# Patient Record
Sex: FEMALE | Race: WHITE | NOT HISPANIC OR LATINO | Employment: OTHER | RURAL
[De-identification: names, ages, dates, MRNs, and addresses within clinical notes are randomized per-mention and may not be internally consistent; named-entity substitution may affect disease eponyms.]

---

## 2023-03-31 ENCOUNTER — HOSPITAL ENCOUNTER (OUTPATIENT)
Dept: CARDIOLOGY | Facility: HOSPITAL | Age: 78
Discharge: HOME | End: 2023-03-31
Attending: STUDENT IN AN ORGANIZED HEALTH CARE EDUCATION/TRAINING PROGRAM
Payer: MEDICARE

## 2023-03-31 DIAGNOSIS — R42 DIZZINESS AND GIDDINESS: ICD-10-CM

## 2023-03-31 DIAGNOSIS — R42 DIZZINESS AND GIDDINESS: Primary | ICD-10-CM

## 2023-03-31 LAB
EJECTION FRACTION: 60 %
RA PRESSURE: 3 MMHG

## 2023-03-31 PROCEDURE — 93306 CV EXTERNAL ECHO (CUPID ONLY): ICD-10-PCS | Mod: 26,,, | Performed by: STUDENT IN AN ORGANIZED HEALTH CARE EDUCATION/TRAINING PROGRAM

## 2023-03-31 PROCEDURE — 93306 TTE W/DOPPLER COMPLETE: CPT | Mod: 26,,, | Performed by: STUDENT IN AN ORGANIZED HEALTH CARE EDUCATION/TRAINING PROGRAM

## 2023-04-06 ENCOUNTER — OFFICE VISIT (OUTPATIENT)
Dept: CARDIOLOGY | Facility: CLINIC | Age: 78
End: 2023-04-06
Payer: MEDICARE

## 2023-04-06 VITALS
BODY MASS INDEX: 29.86 KG/M2 | WEIGHT: 158.19 LBS | HEIGHT: 61 IN | OXYGEN SATURATION: 95 % | HEART RATE: 59 BPM | SYSTOLIC BLOOD PRESSURE: 188 MMHG | DIASTOLIC BLOOD PRESSURE: 92 MMHG

## 2023-04-06 DIAGNOSIS — I10 ESSENTIAL HYPERTENSION: ICD-10-CM

## 2023-04-06 DIAGNOSIS — R55 SYNCOPE AND COLLAPSE: Primary | ICD-10-CM

## 2023-04-06 DIAGNOSIS — E78.5 HYPERLIPIDEMIA, UNSPECIFIED HYPERLIPIDEMIA TYPE: ICD-10-CM

## 2023-04-06 DIAGNOSIS — R42 DIZZINESS AND GIDDINESS: Primary | ICD-10-CM

## 2023-04-06 DIAGNOSIS — R00.1 BRADYCARDIA: ICD-10-CM

## 2023-04-06 DIAGNOSIS — R94.31 NONSPECIFIC ABNORMAL ELECTROCARDIOGRAM (ECG) (EKG): ICD-10-CM

## 2023-04-06 PROCEDURE — 99205 PR OFFICE/OUTPT VISIT, NEW, LEVL V, 60-74 MIN: ICD-10-PCS | Mod: ,,, | Performed by: INTERNAL MEDICINE

## 2023-04-06 PROCEDURE — 99205 OFFICE O/P NEW HI 60 MIN: CPT | Mod: ,,, | Performed by: INTERNAL MEDICINE

## 2023-04-06 RX ORDER — SERTRALINE HYDROCHLORIDE 50 MG/1
TABLET, FILM COATED ORAL
COMMUNITY
Start: 2022-11-12

## 2023-04-06 RX ORDER — IRBESARTAN AND HYDROCHLOROTHIAZIDE 300; 12.5 MG/1; MG/1
TABLET, FILM COATED ORAL
COMMUNITY
Start: 2022-09-21

## 2023-04-06 RX ORDER — CLOPIDOGREL BISULFATE 75 MG/1
TABLET ORAL
COMMUNITY
Start: 2022-09-21

## 2023-04-06 RX ORDER — METFORMIN HYDROCHLORIDE 1000 MG/1
1000 TABLET ORAL 2 TIMES DAILY
COMMUNITY
Start: 2023-04-01

## 2023-04-06 RX ORDER — CLONIDINE HYDROCHLORIDE 0.1 MG/1
TABLET ORAL
COMMUNITY
Start: 2023-01-10

## 2023-04-06 RX ORDER — LEVOTHYROXINE SODIUM 100 UG/1
TABLET ORAL
COMMUNITY
Start: 2023-01-09

## 2023-04-06 NOTE — PROGRESS NOTES
Cardiology Clinic Note:    PCP: Jennifer Zurita MD    REFERRING PHYSICIAN:     CHIEF COMPLAINT: Syncope    HISTORY OF PRESENT ILLNESS:  Дмитрий Collazo is a 77 y.o. female who presents for evaluation of syncope.  Pt reports one severe episodes of dizziness, most severe episode started with tingling all over, got up to get her phone, walking in the hallway, felt the room start to spin, hung onto half wall, thinks went out with everything going black for several seconds, passed quickly, was able to stand and walk after ten to fifteen seconds.  Felt washed out the rest of the day.  She notes frequent symptoms of orthostatic symptoms if gets up too quickly.  Previous cardiac evaluation approximately ten years ago, had echo and stress for similar symptoms, were reportedly normal.      Review of Systems:  Review of Systems   Constitutional: Negative for diaphoresis, malaise/fatigue, night sweats and weight gain.   HENT:  Negative for congestion, ear pain, hearing loss, nosebleeds and sore throat.    Eyes:  Positive for vision loss in right eye. Negative for blurred vision, double vision, pain, photophobia and visual disturbance.        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 Negative for chest pain, claudication, irregular heartbeat, near-syncope, orthopnea, palpitations and syncope.   Respiratory:  Positive for shortness of breath and wheezing. Negative for cough, sleep disturbances due to breathing and snoring.    Endocrine: Negative for cold intolerance, heat intolerance, polydipsia, polyphagia and polyuria.        DM x 10 plus years, monitors bs, are controlled.    Hematologic/Lymphatic: Negative for bleeding problem. Does not bruise/bleed easily.   Skin:  Negative for dry skin, flushing, itching, rash and skin cancer.   Musculoskeletal:  Positive for arthritis and back pain. Negative for falls, joint pain, muscle cramps, muscle weakness and myalgias.        OA  hands, feet low back   Gastrointestinal:  Negative for abdominal pain, change in bowel habit, constipation, diarrhea, dysphagia, heartburn, nausea and vomiting.   Genitourinary:  Negative for bladder incontinence, dysuria, flank pain, frequency and nocturia.   Neurological:  Positive for focal weakness. Negative for dizziness, headaches, light-headedness, loss of balance, numbness, paresthesias and seizures.        Tingling of hands at night.    Psychiatric/Behavioral:  Negative for depression, memory loss and substance abuse. The patient is not nervous/anxious.    Allergic/Immunologic: Negative for environmental allergies.        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 macular edema        PAST SURGICAL HISTORY:  Past Surgical History:   Procedure Laterality Date    KNEE SURGERY      TUBAL LIGATION         SOCIAL HISTORY:  Social History     Socioeconomic History    Marital status: Unknown   Tobacco Use    Smoking status: Never    Smokeless tobacco: Never   Substance and Sexual Activity    Alcohol use: Never    Drug use: Never    Sexual activity: Not Currently       FAMILY HISTORY:  Family History   Problem Relation Age of Onset    Hypertension Mother     Stroke Mother     Hypertension Father     Heart disease Sister     Diabetes Mellitus Sister     Diabetes Mellitus Sister        ALLERGIES:  Allergies as of 04/06/2023 - Reviewed 04/06/2023   Allergen Reaction Noted    Celecoxib  04/06/2023    Codeine  04/06/2023    Penicillins  04/06/2023         MEDICATIONS:  Current Outpatient Medications on File Prior to Visit   Medication Sig Dispense Refill    cloNIDine (CATAPRES) 0.1 MG tablet       clopidogreL (PLAVIX) 75 mg tablet Take by mouth.      irbesartan-hydrochlorothiazide (AVALIDE)  "300-12.5 mg per tablet Take by mouth.      levothyroxine (SYNTHROID) 100 MCG tablet Take by mouth.      metFORMIN (GLUCOPHAGE) 1000 MG tablet Take 1,000 mg by mouth 2 (two) times daily.      sertraline (ZOLOFT) 50 MG tablet        No current facility-administered medications on file prior to visit.          PHYSICAL EXAM:  Blood pressure (!) 188/92, pulse (!) 59, height 5' 1" (1.549 m), weight 71.8 kg (158 lb 3.2 oz), SpO2 95 %.  Wt Readings from Last 3 Encounters:   04/06/23 71.8 kg (158 lb 3.2 oz)      Body mass index is 29.89 kg/m².    Physical Exam  Vitals and nursing note reviewed.   Constitutional:       Appearance: Normal appearance. She is normal weight.   HENT:      Head: Normocephalic and atraumatic.      Right Ear: External ear normal.      Left Ear: External ear normal.   Eyes:      General: No scleral icterus.        Right eye: No discharge.         Left eye: No discharge.      Extraocular Movements: Extraocular movements intact.      Conjunctiva/sclera: Conjunctivae normal.      Pupils: Pupils are equal, round, and reactive to light.      Comments: Blind in right eye   Cardiovascular:      Rate and Rhythm: Normal rate and regular rhythm.      Pulses: Normal pulses.      Heart sounds: Normal heart sounds. No murmur heard.    No friction rub. No gallop.   Pulmonary:      Effort: Pulmonary effort is normal.      Breath sounds: Normal breath sounds. No wheezing, rhonchi or rales.   Chest:      Chest wall: No tenderness.   Abdominal:      General: Abdomen is flat. Bowel sounds are normal. There is no distension.      Palpations: Abdomen is soft.      Tenderness: There is no abdominal tenderness. There is no guarding or rebound.   Musculoskeletal:         General: No swelling or tenderness. Normal range of motion.      Cervical back: Normal range of motion and neck supple.   Skin:     General: Skin is warm and dry.      Findings: No erythema or rash.   Neurological:      General: No focal deficit present. "      Mental Status: She is alert and oriented to person, place, and time.      Cranial Nerves: No cranial nerve deficit.      Motor: No weakness.      Gait: Gait normal.   Psychiatric:         Mood and Affect: Mood normal.         Behavior: Behavior normal.         Thought Content: Thought content normal.         Judgment: Judgment normal.        LABS REVIEWED:  No results found for: WBC, RBC, HGB, HCT, MCV, MCH, MCHC, RDW, PLT, MPV, NRBC, INR  No results found for: NA, K, CL, CO2, BUN, MG, PHOS  No results found for: CPK, AST, ALT  No results found for: GLU, HGBA1C  No results found for: CHOL, HDL, LDL, TRIG, CHOLHDL    CARDIAC STUDIES REVIEWED:    OTHER IMAGING STUDIES REVIEWED:        ASSESSMENT: PLAN:  1. Syncope: Zio, monitor for arrhythmia, echo to eval for structural heart   2. Sinus bradycardia, order lexiscan cardiolyte to evaluate for ischemic substrate  3. Hypertension: may be too well controlled at home, monitor bp AM and PM and record for review at next office visit  4. DM: controlled on current meds  5. Hypothyroid: on replacement  6. Hyperliplidemia: order fasting lipid profile.   7. Stroke: on Plavix for primary prevention.

## 2023-04-13 ENCOUNTER — OUTSIDE PLACE OF SERVICE (OUTPATIENT)
Dept: CARDIOLOGY | Facility: HOSPITAL | Age: 78
End: 2023-04-13
Payer: MEDICARE

## 2023-04-13 PROCEDURE — 78452 HT MUSCLE IMAGE SPECT MULT: CPT | Mod: 26,,, | Performed by: STUDENT IN AN ORGANIZED HEALTH CARE EDUCATION/TRAINING PROGRAM

## 2023-04-13 PROCEDURE — 93018 PR CARDIAC STRESS TST,INTERP/REPT ONLY: ICD-10-PCS | Mod: ,,, | Performed by: STUDENT IN AN ORGANIZED HEALTH CARE EDUCATION/TRAINING PROGRAM

## 2023-04-13 PROCEDURE — 93016 PR CARDIAC STRESS TST,DR SUPERV ONLY: ICD-10-PCS | Mod: ,,, | Performed by: STUDENT IN AN ORGANIZED HEALTH CARE EDUCATION/TRAINING PROGRAM

## 2023-04-13 PROCEDURE — 93016 CV STRESS TEST SUPVJ ONLY: CPT | Mod: ,,, | Performed by: STUDENT IN AN ORGANIZED HEALTH CARE EDUCATION/TRAINING PROGRAM

## 2023-04-13 PROCEDURE — 78452 CHG MYOCARDIAL SPECT MULTIPLE STUDIES: ICD-10-PCS | Mod: 26,,, | Performed by: STUDENT IN AN ORGANIZED HEALTH CARE EDUCATION/TRAINING PROGRAM

## 2023-04-13 PROCEDURE — 93018 CV STRESS TEST I&R ONLY: CPT | Mod: ,,, | Performed by: STUDENT IN AN ORGANIZED HEALTH CARE EDUCATION/TRAINING PROGRAM

## 2023-04-29 PROBLEM — R94.31 NONSPECIFIC ABNORMAL ELECTROCARDIOGRAM (ECG) (EKG): Status: ACTIVE | Noted: 2023-04-29

## 2023-04-29 PROBLEM — I10 ESSENTIAL HYPERTENSION: Status: ACTIVE | Noted: 2023-04-29

## 2023-04-29 PROBLEM — R55 SYNCOPE AND COLLAPSE: Status: ACTIVE | Noted: 2023-04-29

## 2023-04-29 PROBLEM — E78.5 HYPERLIPIDEMIA: Status: ACTIVE | Noted: 2023-04-29

## 2023-04-29 PROBLEM — R00.1 BRADYCARDIA: Status: ACTIVE | Noted: 2023-04-29

## 2023-05-04 ENCOUNTER — OFFICE VISIT (OUTPATIENT)
Dept: CARDIOLOGY | Facility: CLINIC | Age: 78
End: 2023-05-04
Payer: MEDICARE

## 2023-05-04 VITALS
OXYGEN SATURATION: 97 % | BODY MASS INDEX: 30.02 KG/M2 | DIASTOLIC BLOOD PRESSURE: 66 MMHG | HEIGHT: 61 IN | SYSTOLIC BLOOD PRESSURE: 150 MMHG | WEIGHT: 159 LBS | HEART RATE: 62 BPM

## 2023-05-04 DIAGNOSIS — R55 SYNCOPE AND COLLAPSE: ICD-10-CM

## 2023-05-04 DIAGNOSIS — R94.31 NONSPECIFIC ABNORMAL ELECTROCARDIOGRAM (ECG) (EKG): Primary | ICD-10-CM

## 2023-05-04 DIAGNOSIS — R00.1 BRADYCARDIA: ICD-10-CM

## 2023-05-04 DIAGNOSIS — I10 ESSENTIAL HYPERTENSION: ICD-10-CM

## 2023-05-04 PROCEDURE — 99214 OFFICE O/P EST MOD 30 MIN: CPT | Mod: ,,, | Performed by: INTERNAL MEDICINE

## 2023-05-04 PROCEDURE — 99214 PR OFFICE/OUTPT VISIT, EST, LEVL IV, 30-39 MIN: ICD-10-PCS | Mod: ,,, | Performed by: INTERNAL MEDICINE

## 2023-05-04 NOTE — PROGRESS NOTES
Cardiology Clinic Note:    PCP: Jennifer Zurita MD    REFERRING PHYSICIAN:     CHIEF COMPLAINT: Syncope    HISTORY OF PRESENT ILLNESS:  Дмитрий Collazo is a 78 y.o. female who presents for evaluation of syncope, notes symptoms have resolved with much more careful positional changes, counting to ten before she tries to ambulate. She reports is able to perform normal activities of normal living without provocation of chest pain, pressure or shortness of breath.   She had one dizziness episode after bending over to take clothes out of dryer, held on until symptoms passes.       Review of Systems:  Review of Systems   Constitutional: Negative for diaphoresis, malaise/fatigue, night sweats and weight gain.   HENT:  Negative for congestion, ear pain, hearing loss, nosebleeds and sore throat.    Eyes:  Positive for vision loss in right eye. Negative for blurred vision, double vision, pain, photophobia and visual disturbance.        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 Negative for chest pain, claudication, irregular heartbeat, near-syncope, orthopnea, palpitations and syncope.   Respiratory:  Positive for shortness of breath and wheezing. Negative for cough, sleep disturbances due to breathing and snoring.    Endocrine: Negative for cold intolerance, heat intolerance, polydipsia, polyphagia and polyuria.        DM x 10 plus years, monitors bs, are controlled.    Hematologic/Lymphatic: Negative for bleeding problem. Does not bruise/bleed easily.   Skin:  Negative for dry skin, flushing, itching, rash and skin cancer.   Musculoskeletal:  Positive for arthritis and back pain. Negative for falls, joint pain, muscle cramps, muscle weakness and myalgias.        OA hands, feet low back   Gastrointestinal:  Negative for abdominal pain, change in bowel habit, constipation, diarrhea, dysphagia, heartburn, nausea and vomiting.   Genitourinary:  Negative for bladder  incontinence, dysuria, flank pain, frequency and nocturia.   Neurological:  Positive for focal weakness. Negative for dizziness, headaches, light-headedness, loss of balance, numbness, paresthesias and seizures.        Tingling of hands at night.    Psychiatric/Behavioral:  Negative for depression, memory loss and substance abuse. The patient is not nervous/anxious.    Allergic/Immunologic: Negative for environmental allergies.        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        PAST SURGICAL HISTORY:  Past Surgical History:   Procedure Laterality Date    KNEE SURGERY      TUBAL LIGATION         SOCIAL HISTORY:  Social History     Socioeconomic History    Marital status: Unknown   Tobacco Use    Smoking status: Never    Smokeless tobacco: Never   Substance and Sexual Activity    Alcohol use: Never    Drug use: Never    Sexual activity: Not Currently       FAMILY HISTORY:  Family History   Problem Relation Age of Onset    Hypertension Mother     Stroke Mother     Hypertension Father     Heart disease Sister     Diabetes Mellitus Sister     Diabetes Mellitus Sister        ALLERGIES:  Allergies as of 05/04/2023 - Reviewed 05/04/2023   Allergen Reaction Noted    Celecoxib  04/06/2023    Codeine  04/06/2023    Penicillins  04/06/2023         MEDICATIONS:  Current Outpatient Medications on File Prior to Visit   Medication Sig Dispense Refill    cloNIDine (CATAPRES) 0.1 MG tablet       clopidogreL (PLAVIX) 75 mg tablet Take by mouth.      irbesartan-hydrochlorothiazide (AVALIDE) 300-12.5 mg per tablet Take by mouth.      levothyroxine (SYNTHROID) 100 MCG tablet Take by mouth.      metFORMIN (GLUCOPHAGE) 1000 MG tablet Take 1,000 mg by mouth 2 (two) times daily.      sertraline (ZOLOFT) 50 MG tablet     "    No current facility-administered medications on file prior to visit.          PHYSICAL EXAM:  Blood pressure (!) 150/66, pulse 62, height 5' 1" (1.549 m), weight 72.1 kg (159 lb), SpO2 97 %.  Wt Readings from Last 3 Encounters:   05/04/23 72.1 kg (159 lb)   04/06/23 71.8 kg (158 lb 3.2 oz)      Body mass index is 30.04 kg/m².    Physical Exam  Vitals and nursing note reviewed.   Constitutional:       Appearance: Normal appearance. She is normal weight.   HENT:      Head: Normocephalic and atraumatic.      Right Ear: External ear normal.      Left Ear: External ear normal.   Eyes:      General: No scleral icterus.        Right eye: No discharge.         Left eye: No discharge.      Extraocular Movements: Extraocular movements intact.      Conjunctiva/sclera: Conjunctivae normal.      Pupils: Pupils are equal, round, and reactive to light.      Comments: Blind in right eye   Cardiovascular:      Rate and Rhythm: Normal rate and regular rhythm.      Pulses: Normal pulses.      Heart sounds: Normal heart sounds. No murmur heard.    No friction rub. No gallop.   Pulmonary:      Effort: Pulmonary effort is normal.      Breath sounds: Normal breath sounds. No wheezing, rhonchi or rales.   Chest:      Chest wall: No tenderness.   Abdominal:      General: Abdomen is flat. Bowel sounds are normal. There is no distension.      Palpations: Abdomen is soft.      Tenderness: There is no abdominal tenderness. There is no guarding or rebound.   Musculoskeletal:         General: No swelling or tenderness. Normal range of motion.      Cervical back: Normal range of motion and neck supple.   Skin:     General: Skin is warm and dry.      Findings: No erythema or rash.   Neurological:      General: No focal deficit present.      Mental Status: She is alert and oriented to person, place, and time.      Cranial Nerves: No cranial nerve deficit.      Motor: No weakness.      Gait: Gait normal.   Psychiatric:         Mood and Affect: " Mood normal.         Behavior: Behavior normal.         Thought Content: Thought content normal.         Judgment: Judgment normal.        LABS REVIEWED:  No results found for: WBC, RBC, HGB, HCT, MCV, MCH, MCHC, RDW, PLT, MPV, NRBC, INR  No results found for: NA, K, CL, CO2, BUN, MG, PHOS  No results found for: CPK, AST, ALT  No results found for: GLU, HGBA1C  No results found for: CHOL, HDL, LDL, TRIG, CHOLHDL    CARDIAC STUDIES REVIEWED:    OTHER IMAGING STUDIES REVIEWED:        ASSESSMENT: PLAN:  1. Syncope: appears, mostly due to orthostatic hypotension, will begin compression stockings, underwent Zio, monitor for arrhythmia, awaiting results. echo shows moderate MR, otherwise no structural heart disease.  2. Sinus bradycardia, lexiscan cardiolyte negative for ischemic substrate  3. Hypertension: not fully controlled at home, monitored bp AM and PM, has been been high if she has a headache, will monitor with compression stockings, if she is not having syncopal episodes, may be able to increase bp meds to avoid headaches. .  4. DM: controlled on current meds  5. Hypothyroid: on replacement  6. Hyperliplidemia: order fasting lipid profile.   7. Stroke: on Plavix for primary prevention.     Follow up in three months.

## 2023-06-08 ENCOUNTER — OFFICE VISIT (OUTPATIENT)
Dept: CARDIOLOGY | Facility: CLINIC | Age: 78
End: 2023-06-08
Payer: MEDICARE

## 2023-06-08 VITALS
DIASTOLIC BLOOD PRESSURE: 84 MMHG | OXYGEN SATURATION: 99 % | HEART RATE: 51 BPM | HEIGHT: 61 IN | WEIGHT: 155 LBS | BODY MASS INDEX: 29.27 KG/M2 | SYSTOLIC BLOOD PRESSURE: 142 MMHG

## 2023-06-08 DIAGNOSIS — E78.5 HYPERLIPIDEMIA, UNSPECIFIED HYPERLIPIDEMIA TYPE: ICD-10-CM

## 2023-06-08 DIAGNOSIS — I10 ESSENTIAL HYPERTENSION: ICD-10-CM

## 2023-06-08 DIAGNOSIS — R00.1 BRADYCARDIA: Primary | ICD-10-CM

## 2023-06-08 DIAGNOSIS — R55 SYNCOPE AND COLLAPSE: ICD-10-CM

## 2023-06-08 PROCEDURE — 99214 OFFICE O/P EST MOD 30 MIN: CPT | Mod: ,,, | Performed by: INTERNAL MEDICINE

## 2023-06-08 PROCEDURE — 99214 PR OFFICE/OUTPT VISIT, EST, LEVL IV, 30-39 MIN: ICD-10-PCS | Mod: ,,, | Performed by: INTERNAL MEDICINE

## 2023-06-08 RX ORDER — ASPIRIN 81 MG/1
81 TABLET ORAL DAILY
COMMUNITY
End: 2023-06-11 | Stop reason: ALTCHOICE

## 2023-06-08 RX ORDER — GABAPENTIN 300 MG/1
300 CAPSULE ORAL 3 TIMES DAILY
Status: ON HOLD | COMMUNITY
End: 2024-03-05

## 2023-06-11 RX ORDER — METOPROLOL SUCCINATE 25 MG/1
25 TABLET, EXTENDED RELEASE ORAL DAILY
Qty: 30 TABLET | Refills: 5 | Status: SHIPPED | OUTPATIENT
Start: 2023-06-11

## 2023-06-11 NOTE — PROGRESS NOTES
Cardiology Clinic Note:    PCP: Jennifer Zurita MD    REFERRING PHYSICIAN:     CHIEF COMPLAINT: Syncope    HISTORY OF PRESENT ILLNESS:  Дмитрий Collazo is a 78 y.o. female who presents for evaluation of syncope, notes symptoms have resolved with much more careful positional changes, counting to ten before she tries to ambulate and wearing compression stocking. She reports is able to perform normal activities of normal living without provocation of chest pain, pressure or shortness of breath.   She notes episodes of dizziness has resolved.                                Review of Systems:  Review of Systems   Constitutional: Negative for diaphoresis, malaise/fatigue, night sweats and weight gain.   HENT:  Negative for congestion, ear pain, hearing loss, nosebleeds and sore throat.    Eyes:  Positive for vision loss in right eye. Negative for blurred vision, double vision, pain, photophobia and visual disturbance.        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 Negative for chest pain, claudication, irregular heartbeat, near-syncope, orthopnea, palpitations and syncope.   Respiratory:  Positive for shortness of breath and wheezing. Negative for cough, sleep disturbances due to breathing and snoring.    Endocrine: Negative for cold intolerance, heat intolerance, polydipsia, polyphagia and polyuria.        DM x 10 plus years, monitors bs, are controlled.    Hematologic/Lymphatic: Negative for bleeding problem. Does not bruise/bleed easily.   Skin:  Negative for dry skin, flushing, itching, rash and skin cancer.   Musculoskeletal:  Positive for arthritis and back pain. Negative for falls, joint pain, muscle cramps, muscle weakness and myalgias.        OA hands, feet low back   Gastrointestinal:  Negative for abdominal pain, change in bowel habit, constipation, diarrhea, dysphagia, heartburn, nausea and vomiting.   Genitourinary:  Negative for bladder incontinence,  dysuria, flank pain, frequency and nocturia.   Neurological:  Positive for focal weakness. Negative for dizziness, headaches, light-headedness, loss of balance, numbness, paresthesias and seizures.        Tingling of hands at night.    Psychiatric/Behavioral:  Negative for depression, memory loss and substance abuse. The patient is not nervous/anxious.    Allergic/Immunologic: Negative for environmental allergies.        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        PAST SURGICAL HISTORY:  Past Surgical History:   Procedure Laterality Date    KNEE SURGERY      TUBAL LIGATION         SOCIAL HISTORY:  Social History     Socioeconomic History    Marital status: Unknown   Tobacco Use    Smoking status: Never    Smokeless tobacco: Never   Substance and Sexual Activity    Alcohol use: Never    Drug use: Never    Sexual activity: Not Currently       FAMILY HISTORY:  Family History   Problem Relation Age of Onset    Hypertension Mother     Stroke Mother     Hypertension Father     Heart disease Sister     Diabetes Mellitus Sister     Diabetes Mellitus Sister        ALLERGIES:  Allergies as of 06/08/2023 - Reviewed 06/08/2023   Allergen Reaction Noted    Celecoxib  04/06/2023    Codeine  04/06/2023    Penicillins  04/06/2023         MEDICATIONS:  Current Outpatient Medications on File Prior to Visit   Medication Sig Dispense Refill    aspirin (ECOTRIN) 81 MG EC tablet Take 81 mg by mouth once daily.      cloNIDine (CATAPRES) 0.1 MG tablet       clopidogreL (PLAVIX) 75 mg tablet Take by mouth.      gabapentin (NEURONTIN) 300 MG capsule Take 300 mg by mouth 3 (three) times daily.      irbesartan-hydrochlorothiazide (AVALIDE) 300-12.5 mg per tablet Take by mouth.      levothyroxine (SYNTHROID) 100 MCG tablet  "Take by mouth.      metFORMIN (GLUCOPHAGE) 1000 MG tablet Take 1,000 mg by mouth 2 (two) times daily.      sertraline (ZOLOFT) 50 MG tablet        No current facility-administered medications on file prior to visit.          PHYSICAL EXAM:  Blood pressure (!) 142/84, pulse (!) 51, height 5' 1" (1.549 m), weight 70.3 kg (155 lb), SpO2 99 %.  Wt Readings from Last 3 Encounters:   06/08/23 70.3 kg (155 lb)   05/04/23 72.1 kg (159 lb)   04/06/23 71.8 kg (158 lb 3.2 oz)      Body mass index is 29.29 kg/m².    Physical Exam  Vitals and nursing note reviewed.   Constitutional:       Appearance: Normal appearance. She is normal weight.   HENT:      Head: Normocephalic and atraumatic.      Right Ear: External ear normal.      Left Ear: External ear normal.   Eyes:      General: No scleral icterus.        Right eye: No discharge.         Left eye: No discharge.      Extraocular Movements: Extraocular movements intact.      Conjunctiva/sclera: Conjunctivae normal.      Pupils: Pupils are equal, round, and reactive to light.      Comments: Blind in right eye   Cardiovascular:      Rate and Rhythm: Normal rate and regular rhythm.      Pulses: Normal pulses.      Heart sounds: Normal heart sounds. No murmur heard.    No friction rub. No gallop.   Pulmonary:      Effort: Pulmonary effort is normal.      Breath sounds: Normal breath sounds. No wheezing, rhonchi or rales.   Chest:      Chest wall: No tenderness.   Abdominal:      General: Abdomen is flat. Bowel sounds are normal. There is no distension.      Palpations: Abdomen is soft.      Tenderness: There is no abdominal tenderness. There is no guarding or rebound.   Musculoskeletal:         General: No swelling or tenderness. Normal range of motion.      Cervical back: Normal range of motion and neck supple.   Skin:     General: Skin is warm and dry.      Findings: No erythema or rash.   Neurological:      General: No focal deficit present.      Mental Status: She is alert and " oriented to person, place, and time.      Cranial Nerves: No cranial nerve deficit.      Motor: No weakness.      Gait: Gait normal.   Psychiatric:         Mood and Affect: Mood normal.         Behavior: Behavior normal.         Thought Content: Thought content normal.         Judgment: Judgment normal.        LABS REVIEWED:  No results found for: WBC, RBC, HGB, HCT, MCV, MCH, MCHC, RDW, PLT, MPV, NRBC, INR  No results found for: NA, K, CL, CO2, BUN, MG, PHOS  No results found for: CPK, AST, ALT  No results found for: GLU, HGBA1C  No results found for: CHOL, HDL, LDL, TRIG, CHOLHDL    CARDIAC STUDIES REVIEWED:    OTHER IMAGING STUDIES REVIEWED:        ASSESSMENT: PLAN:  1. Syncope: appears, mostly due to orthostatic hypotension, has improved, with compression stockings, u   2. Sinus bradycardia, lexiscan cardiolyte negative for ischemic substrate  3. Hypertension: not fully controlled at home, monitored bp AM and PM, has been been high if she has a headache, gets headache when blood pressure is elevated.             4. DM: controlled on current meds  5. Hypothyroid: on replacement  6 . Stroke: on Plavix for primary prevention.     Follow up in 4 to 6 wks

## 2023-08-10 ENCOUNTER — OFFICE VISIT (OUTPATIENT)
Dept: CARDIOLOGY | Facility: CLINIC | Age: 78
End: 2023-08-10
Payer: MEDICARE

## 2023-08-10 VITALS
SYSTOLIC BLOOD PRESSURE: 140 MMHG | HEIGHT: 61 IN | OXYGEN SATURATION: 96 % | HEART RATE: 57 BPM | WEIGHT: 154 LBS | DIASTOLIC BLOOD PRESSURE: 90 MMHG | BODY MASS INDEX: 29.07 KG/M2

## 2023-08-10 DIAGNOSIS — R55 SYNCOPE AND COLLAPSE: ICD-10-CM

## 2023-08-10 DIAGNOSIS — I10 ESSENTIAL HYPERTENSION: Primary | ICD-10-CM

## 2023-08-10 DIAGNOSIS — E78.2 MIXED HYPERLIPIDEMIA: ICD-10-CM

## 2023-08-10 DIAGNOSIS — E11.9 DIABETES MELLITUS WITHOUT COMPLICATION: ICD-10-CM

## 2023-08-10 DIAGNOSIS — R00.1 BRADYCARDIA: ICD-10-CM

## 2023-08-10 PROCEDURE — 99214 OFFICE O/P EST MOD 30 MIN: CPT | Mod: ,,, | Performed by: INTERNAL MEDICINE

## 2023-08-10 PROCEDURE — 99214 PR OFFICE/OUTPT VISIT, EST, LEVL IV, 30-39 MIN: ICD-10-PCS | Mod: ,,, | Performed by: INTERNAL MEDICINE

## 2023-08-28 PROBLEM — E11.9 DIABETES MELLITUS WITHOUT COMPLICATION: Status: ACTIVE | Noted: 2023-08-28

## 2023-08-28 NOTE — PROGRESS NOTES
Cardiology Clinic Note:    PCP: Jennifer Zurita MD    REFERRING PHYSICIAN:     CHIEF COMPLAINT: Syncope, hypertension    HISTORY OF PRESENT ILLNESS:  Дмитрий Collazo is a 78 y.o. female who presents for evaluation of orthostatic syncope, hypertension.  She notes blood pressure was not well controlled, presented to the ER with headache and elevated blood pressure, was started on Toprol XL 25 mg q d,   She is monitoring her blood pressure at home, notes is much better controlled, headaches have resolved. She also notes symptoms have resolved with much more careful positional changes, counting to ten before she tries to ambulate and wearing compression stocking. She reports is able to perform normal activities of normal living without provocation of chest pain, pressure or shortness of breath.   She notes episodes of dizziness has resolved.                                Review of Systems:  Review of Systems   Constitutional: Negative for diaphoresis, malaise/fatigue, night sweats and weight gain.   HENT:  Negative for congestion, ear pain, hearing loss, nosebleeds and sore throat.    Eyes:  Positive for vision loss in right eye. Negative for blurred vision, double vision, pain, photophobia and visual disturbance.        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 Negative for chest pain, claudication, irregular heartbeat, near-syncope, orthopnea, palpitations and syncope.   Respiratory:  Positive for shortness of breath and wheezing. Negative for cough, sleep disturbances due to breathing and snoring.    Endocrine: Negative for cold intolerance, heat intolerance, polydipsia, polyphagia and polyuria.        DM x 10 plus years, monitors bs, are controlled.    Hematologic/Lymphatic: Negative for bleeding problem. Does not bruise/bleed easily.   Skin:  Negative for dry skin, flushing, itching, rash and skin cancer.   Musculoskeletal:  Positive for arthritis and back  pain. Negative for falls, joint pain, muscle cramps, muscle weakness and myalgias.        OA hands, feet low back   Gastrointestinal:  Negative for abdominal pain, change in bowel habit, constipation, diarrhea, dysphagia, heartburn, nausea and vomiting.   Genitourinary:  Negative for bladder incontinence, dysuria, flank pain, frequency and nocturia.   Neurological:  Positive for focal weakness. Negative for dizziness, headaches, light-headedness, loss of balance, numbness, paresthesias and seizures.        Tingling of hands at night.    Psychiatric/Behavioral:  Negative for depression, memory loss and substance abuse. The patient is not nervous/anxious.    Allergic/Immunologic: Negative for environmental allergies.          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        PAST SURGICAL HISTORY:  Past Surgical History:   Procedure Laterality Date    KNEE SURGERY      TUBAL LIGATION         SOCIAL HISTORY:  Social History     Socioeconomic History    Marital status: Unknown   Tobacco Use    Smoking status: Never    Smokeless tobacco: Never   Substance and Sexual Activity    Alcohol use: Never    Drug use: Never    Sexual activity: Not Currently       FAMILY HISTORY:  Family History   Problem Relation Age of Onset    Hypertension Mother     Stroke Mother     Hypertension Father     Heart disease Sister     Diabetes Mellitus Sister     Diabetes Mellitus Sister        ALLERGIES:  Allergies as of 08/10/2023 - Reviewed 08/10/2023   Allergen Reaction Noted    Celecoxib  04/06/2023    Codeine  04/06/2023    Penicillins  04/06/2023         MEDICATIONS:  Current Outpatient Medications on File Prior to Visit   Medication Sig Dispense Refill    cloNIDine (CATAPRES) 0.1 MG tablet       clopidogreL (PLAVIX) 75 mg  "tablet Take by mouth.      gabapentin (NEURONTIN) 300 MG capsule Take 300 mg by mouth 3 (three) times daily.      irbesartan-hydrochlorothiazide (AVALIDE) 300-12.5 mg per tablet Take by mouth.      levothyroxine (SYNTHROID) 100 MCG tablet Take by mouth.      metFORMIN (GLUCOPHAGE) 1000 MG tablet Take 1,000 mg by mouth 2 (two) times daily.      metoprolol succinate (TOPROL-XL) 25 MG 24 hr tablet Take 1 tablet (25 mg total) by mouth once daily. 30 tablet 5    sertraline (ZOLOFT) 50 MG tablet        No current facility-administered medications on file prior to visit.          PHYSICAL EXAM:  Blood pressure (!) 140/90, pulse (!) 57, height 5' 1" (1.549 m), weight 69.9 kg (154 lb), SpO2 96 %.  Wt Readings from Last 3 Encounters:   08/10/23 69.9 kg (154 lb)   06/08/23 70.3 kg (155 lb)   05/04/23 72.1 kg (159 lb)      Body mass index is 29.1 kg/m².    Physical Exam  Vitals and nursing note reviewed.   Constitutional:       Appearance: Normal appearance. She is normal weight.   HENT:      Head: Normocephalic and atraumatic.      Right Ear: External ear normal.      Left Ear: External ear normal.   Eyes:      General: No scleral icterus.        Right eye: No discharge.         Left eye: No discharge.      Extraocular Movements: Extraocular movements intact.      Conjunctiva/sclera: Conjunctivae normal.      Pupils: Pupils are equal, round, and reactive to light.      Comments: Blind in right eye   Cardiovascular:      Rate and Rhythm: Normal rate and regular rhythm.      Pulses: Normal pulses.      Heart sounds: Normal heart sounds. No murmur heard.     No friction rub. No gallop.   Pulmonary:      Effort: Pulmonary effort is normal.      Breath sounds: Normal breath sounds. No wheezing, rhonchi or rales.   Chest:      Chest wall: No tenderness.   Abdominal:      General: Abdomen is flat. Bowel sounds are normal. There is no distension.      Palpations: Abdomen is soft.      Tenderness: There is no abdominal tenderness. " "There is no guarding or rebound.   Musculoskeletal:         General: No swelling or tenderness. Normal range of motion.      Cervical back: Normal range of motion and neck supple.   Skin:     General: Skin is warm and dry.      Findings: No erythema or rash.   Neurological:      General: No focal deficit present.      Mental Status: She is alert and oriented to person, place, and time.      Cranial Nerves: No cranial nerve deficit.      Motor: No weakness.      Gait: Gait normal.   Psychiatric:         Mood and Affect: Mood normal.         Behavior: Behavior normal.         Thought Content: Thought content normal.         Judgment: Judgment normal.          LABS REVIEWED:  No results found for: "WBC", "RBC", "HGB", "HCT", "MCV", "MCH", "MCHC", "RDW", "PLT", "MPV", "NRBC", "INR"  No results found for: "NA", "K", "CL", "CO2", "BUN", "MG", "PHOS"  No results found for: "CPK", "AST", "ALT"  No results found for: "GLU", "HGBA1C"  No results found for: "CHOL", "HDL", "LDL", "TRIG", "CHOLHDL"    CARDIAC STUDIES REVIEWED:    OTHER IMAGING STUDIES REVIEWED:        ASSESSMENT: PLAN:  1. Syncope: appears, mostly due to orthostatic hypotension, has improved, with compression stockings, careful positional changes, is tolerating Toprol.        2. Sinus bradycardia, mild, asymptomatic,  lexiscan cardiolyte negative for ischemic substrate  3. Hypertension: much better controlled on toprol XL, always elevated in doctors office, reports low 130ss/70s at home            4. DM: controlled on current meds  5. Hypothyroid: on replacement  6 . Stroke: on Plavix for primary prevention.     Follow up in 6 months, sooner if symptoms change    "

## 2023-10-05 ENCOUNTER — OUTSIDE PLACE OF SERVICE (OUTPATIENT)
Dept: CARDIOLOGY | Facility: HOSPITAL | Age: 78
End: 2023-10-05

## 2023-10-05 PROCEDURE — 93010 ELECTROCARDIOGRAM REPORT: CPT | Mod: ,,, | Performed by: INTERNAL MEDICINE

## 2023-10-05 PROCEDURE — 93010 PR ELECTROCARDIOGRAM REPORT: ICD-10-PCS | Mod: ,,, | Performed by: INTERNAL MEDICINE

## 2024-01-22 ENCOUNTER — OFFICE VISIT (OUTPATIENT)
Dept: CARDIOLOGY | Facility: CLINIC | Age: 79
End: 2024-01-22
Payer: MEDICARE

## 2024-01-22 VITALS
HEART RATE: 74 BPM | BODY MASS INDEX: 28.32 KG/M2 | HEIGHT: 61 IN | WEIGHT: 150 LBS | SYSTOLIC BLOOD PRESSURE: 198 MMHG | OXYGEN SATURATION: 99 % | DIASTOLIC BLOOD PRESSURE: 100 MMHG

## 2024-01-22 DIAGNOSIS — I10 ESSENTIAL HYPERTENSION: Chronic | ICD-10-CM

## 2024-01-22 DIAGNOSIS — E78.2 MIXED HYPERLIPIDEMIA: Chronic | ICD-10-CM

## 2024-01-22 DIAGNOSIS — R55 SYNCOPE AND COLLAPSE: ICD-10-CM

## 2024-01-22 DIAGNOSIS — E11.9 DIABETES MELLITUS WITHOUT COMPLICATION: Chronic | ICD-10-CM

## 2024-01-22 DIAGNOSIS — G47.00 INSOMNIA, UNSPECIFIED TYPE: Chronic | ICD-10-CM

## 2024-01-22 DIAGNOSIS — R94.31 NONSPECIFIC ABNORMAL ELECTROCARDIOGRAM (ECG) (EKG): ICD-10-CM

## 2024-01-22 DIAGNOSIS — R00.1 BRADYCARDIA: Primary | ICD-10-CM

## 2024-01-22 PROCEDURE — 93005 ELECTROCARDIOGRAM TRACING: CPT | Mod: PBBFAC | Performed by: INTERNAL MEDICINE

## 2024-01-22 PROCEDURE — 93246 EXT ECG>7D<15D RECORDING: CPT | Performed by: INTERNAL MEDICINE

## 2024-01-22 PROCEDURE — 99214 OFFICE O/P EST MOD 30 MIN: CPT | Mod: PBBFAC,25 | Performed by: INTERNAL MEDICINE

## 2024-01-22 PROCEDURE — 99215 OFFICE O/P EST HI 40 MIN: CPT | Mod: S$PBB,,, | Performed by: INTERNAL MEDICINE

## 2024-01-22 PROCEDURE — 93010 ELECTROCARDIOGRAM REPORT: CPT | Mod: S$PBB,,, | Performed by: INTERNAL MEDICINE

## 2024-01-25 NOTE — PROGRESS NOTES
PCP: Jennifer Zurita MD    Referring Provider:     Subjective:   Дмитрий Collazo is a 78 y.o. female with hx of HTN, HLD, and NIDDM who presents for abnormal heart rate (slow).      8/10/23--Dr. Gomez--Дмитрий Collazo is a 78 y.o. female who presents for evaluation of orthostatic syncope, hypertension.  She notes blood pressure was not well controlled, presented to the ER with headache and elevated blood pressure, was started on Toprol XL 25 mg q d,   She is monitoring her blood pressure at home, notes is much better controlled, headaches have resolved. She also notes symptoms have resolved with much more careful positional changes, counting to ten before she tries to ambulate and wearing compression stocking. She reports is able to perform normal activities of normal living without provocation of chest pain, pressure or shortness of breath.   She notes episodes of dizziness has resolved.                                                                               Fhx:  Family History   Problem Relation Age of Onset    Hypertension Mother     Stroke Mother     Hypertension Father     Heart disease Sister     Diabetes Mellitus Sister     Diabetes Mellitus Sister      Shx:   Social History     Socioeconomic History    Marital status: Unknown   Tobacco Use    Smoking status: Never    Smokeless tobacco: Never   Substance and Sexual Activity    Alcohol use: Never    Drug use: Never    Sexual activity: Not Currently       EKG  1/22/24--sinus bradycardia, ST and T wave abn, 54 bpm     ECHO:  3/31/23--Atrial fibrillation observed.  The left ventricle is normal in size with normal systolic function.  The estimated ejection fraction is 60%.  Normal right ventricular size with normal right ventricular systolic function.  Mild left atrial enlargement.  Mild aortic regurgitation.  Moderate mitral regurgitation.  Mild tricuspid regurgitation.  Normal central venous pressure (3 mmHg).     Lexiscan:  4/13/23--Normal. EF  "76%.    Zio:  4/6/23-4/13/23--Predominantly NSR. Short runs of asymptomatic SVT.  Very rare PAC's, PVC's.  Rare bigeminy, trigeminy.  No significant pauses.  No pt triggered events.         Current Outpatient Medications:     cloNIDine (CATAPRES) 0.1 MG tablet, , Disp: , Rfl:     clopidogreL (PLAVIX) 75 mg tablet, Take by mouth., Disp: , Rfl:     gabapentin (NEURONTIN) 300 MG capsule, Take 300 mg by mouth 3 (three) times daily., Disp: , Rfl:     irbesartan-hydrochlorothiazide (AVALIDE) 300-12.5 mg per tablet, Take by mouth., Disp: , Rfl:     levothyroxine (SYNTHROID) 100 MCG tablet, Take by mouth., Disp: , Rfl:     metFORMIN (GLUCOPHAGE) 1000 MG tablet, Take 1,000 mg by mouth 2 (two) times daily., Disp: , Rfl:     metoprolol succinate (TOPROL-XL) 25 MG 24 hr tablet, Take 1 tablet (25 mg total) by mouth once daily., Disp: 30 tablet, Rfl: 5    sertraline (ZOLOFT) 50 MG tablet, , Disp: , Rfl:   Did not bring medications.     Review of Systems   Respiratory:  Positive for shortness of breath.    Cardiovascular:  Positive for chest pain. Negative for palpitations and leg swelling.   Neurological:  Positive for dizziness. Negative for loss of consciousness.           Objective:   BP (!) 198/100 (BP Location: Left arm, Patient Position: Sitting)   Pulse 74   Ht 5' 1" (1.549 m)   Wt 68 kg (150 lb)   SpO2 99%   BMI 28.34 kg/m²     Physical Exam  Vitals reviewed.   Constitutional:       General: She is not in acute distress.     Comments: Looks tired, sleepy in wheelchair   HENT:      Head: Normocephalic and atraumatic.   Neck:      Vascular: No carotid bruit or JVD.   Cardiovascular:      Rate and Rhythm: Normal rate and regular rhythm.      Pulses: Normal pulses.           Radial pulses are 2+ on the right side and 2+ on the left side.        Dorsalis pedis pulses are 2+ on the right side and 2+ on the left side.      Heart sounds: Normal heart sounds. No murmur heard.  Pulmonary:      Effort: Pulmonary effort is " normal.      Breath sounds: Normal breath sounds.   Skin:     General: Skin is warm and dry.   Neurological:      General: No focal deficit present.      Mental Status: She is alert.           Assessment:     1. Bradycardia      HR a little over 35      2. Nonspecific abnormal electrocardiogram (ECG) (EKG)  Lipid Panel    ALT (SGPT)    Echo    X-Ray Chest PA And Lateral    Cardiac Monitor - 3-15 Day Adult (Cupid Only)      3. Syncope and collapse  Lipid Panel    ALT (SGPT)    Echo    X-Ray Chest PA And Lateral    Cardiac Monitor - 3-15 Day Adult (Cupid Only)    syncope vs narcolepsy      4. Essential hypertension  EKG 12-lead    EKG 12-lead      5. Mixed hyperlipidemia  Lipid Panel      6. Diabetes mellitus without complication        7. Insomnia, unspecified type  Ambulatory referral/consult to Sleep Disorders    with frequent nocturnal awakening, daytime somnolence, sleepy in daytime            Plan:   Zio monitor  Cxr-pa/ lat  Echo  FLP/ ALT  Sleep consult  F/u after tests.

## 2024-01-26 PROBLEM — G47.00 INSOMNIA: Chronic | Status: ACTIVE | Noted: 2024-01-26

## 2024-02-02 ENCOUNTER — HOSPITAL ENCOUNTER (OUTPATIENT)
Dept: RADIOLOGY | Facility: HOSPITAL | Age: 79
Discharge: HOME | End: 2024-02-02
Attending: INTERNAL MEDICINE
Payer: MEDICARE

## 2024-02-02 ENCOUNTER — HOSPITAL ENCOUNTER (OUTPATIENT)
Dept: CARDIOLOGY | Facility: HOSPITAL | Age: 79
Discharge: HOME | End: 2024-02-02
Attending: INTERNAL MEDICINE
Payer: MEDICARE

## 2024-02-02 DIAGNOSIS — R94.31 NONSPECIFIC ABNORMAL ELECTROCARDIOGRAM (ECG) (EKG): ICD-10-CM

## 2024-02-02 DIAGNOSIS — R55 SYNCOPE AND COLLAPSE: ICD-10-CM

## 2024-02-02 PROCEDURE — 93306 TTE W/DOPPLER COMPLETE: CPT

## 2024-02-02 PROCEDURE — 71046 X-RAY EXAM CHEST 2 VIEWS: CPT | Mod: 26,,, | Performed by: STUDENT IN AN ORGANIZED HEALTH CARE EDUCATION/TRAINING PROGRAM

## 2024-02-02 PROCEDURE — 71046 X-RAY EXAM CHEST 2 VIEWS: CPT | Mod: TC

## 2024-02-02 PROCEDURE — 93306 TTE W/DOPPLER COMPLETE: CPT | Mod: 26,,, | Performed by: INTERNAL MEDICINE

## 2024-02-19 ENCOUNTER — OFFICE VISIT (OUTPATIENT)
Dept: CARDIOLOGY | Facility: CLINIC | Age: 79
End: 2024-02-19
Payer: MEDICARE

## 2024-02-19 VITALS
HEIGHT: 61 IN | SYSTOLIC BLOOD PRESSURE: 228 MMHG | HEART RATE: 51 BPM | OXYGEN SATURATION: 94 % | DIASTOLIC BLOOD PRESSURE: 96 MMHG | WEIGHT: 154.81 LBS | BODY MASS INDEX: 29.23 KG/M2

## 2024-02-19 DIAGNOSIS — I10 ESSENTIAL HYPERTENSION: Chronic | ICD-10-CM

## 2024-02-19 DIAGNOSIS — G47.33 OSA (OBSTRUCTIVE SLEEP APNEA): Chronic | ICD-10-CM

## 2024-02-19 DIAGNOSIS — E11.9 DIABETES MELLITUS WITHOUT COMPLICATION: Chronic | ICD-10-CM

## 2024-02-19 DIAGNOSIS — E78.2 MIXED HYPERLIPIDEMIA: Chronic | ICD-10-CM

## 2024-02-19 DIAGNOSIS — Z01.812 PRE-OPERATIVE LABORATORY EXAMINATION: ICD-10-CM

## 2024-02-19 DIAGNOSIS — I44.1 AV BLOCK, MOBITZ II: Primary | Chronic | ICD-10-CM

## 2024-02-19 LAB
AORTIC ROOT ANNULUS: 2.84 CM
AORTIC VALVE CUSP SEPERATION: 2.11 CM
AV INDEX (PROSTH): 0.6
AV MEAN GRADIENT: 5 MMHG
AV PEAK GRADIENT: 8 MMHG
AV VALVE AREA BY VELOCITY RATIO: 1.07 CM²
AV VALVE AREA: 1.17 CM²
AV VELOCITY RATIO: 0.55
CV ECHO LV RWT: 0.81 CM
DOP CALC AO PEAK VEL: 1.39 M/S
DOP CALC AO VTI: 36.8 CM
DOP CALC LVOT AREA: 2 CM2
DOP CALC LVOT DIAMETER: 1.58 CM
DOP CALC LVOT PEAK VEL: 0.76 M/S
DOP CALC LVOT STROKE VOLUME: 43.11 CM3
DOP CALC MV VTI: 42.6 CM
DOP CALCLVOT PEAK VEL VTI: 22 CM
E WAVE DECELERATION TIME: 532.17 MSEC
E/A RATIO: 1.18
E/E' RATIO: 17.75 M/S
ECHO LV POSTERIOR WALL: 1.68 CM (ref 0.6–1.1)
EJECTION FRACTION: 55 %
FRACTIONAL SHORTENING: 26 % (ref 28–44)
INTERVENTRICULAR SEPTUM: 1.68 CM (ref 0.6–1.1)
IVC DIAMETER: 1.79 CM
LA MAJOR: 3.69 CM
LEFT ATRIUM SIZE: 3.31 CM
LEFT INTERNAL DIMENSION IN SYSTOLE: 3.04 CM (ref 2.1–4)
LEFT VENTRICLE DIASTOLIC VOLUME: 75.47 ML
LEFT VENTRICLE SYSTOLIC VOLUME: 36.08 ML
LEFT VENTRICULAR INTERNAL DIMENSION IN DIASTOLE: 4.13 CM (ref 3.5–6)
LEFT VENTRICULAR MASS: 291.59 G
LV LATERAL E/E' RATIO: 17.75 M/S
LV SEPTAL E/E' RATIO: 17.75 M/S
LVOT MG: 1.47 MMHG
LVOT MV: 0.58 CM/S
MV MEAN GRADIENT: 1 MMHG
MV PEAK A VEL: 0.6 M/S
MV PEAK E VEL: 0.71 M/S
MV PEAK GRADIENT: 3 MMHG
MV STENOSIS PRESSURE HALF TIME: 141.02 MS
MV VALVE AREA BY CONTINUITY EQUATION: 1.01 CM2
MV VALVE AREA P 1/2 METHOD: 1.56 CM2
PISA MRMAX VEL: 6.59 M/S
PISA TR MAX VEL: 2.39 M/S
PV PEAK GRADIENT: 2 MMHG
PV PEAK VELOCITY: 0.75 M/S
RA MAJOR: 3.17 CM
RA PRESSURE ESTIMATED: 15 MMHG
RIGHT VENTRICULAR END-DIASTOLIC DIMENSION: 2.95 CM
RV TB RVSP: 17 MMHG
TDI LATERAL: 0.04 M/S
TDI SEPTAL: 0.04 M/S
TDI: 0.04 M/S
TR MAX PG: 23 MMHG
TRICUSPID ANNULAR PLANE SYSTOLIC EXCURSION: 2.03 CM
TV REST PULMONARY ARTERY PRESSURE: 38 MMHG

## 2024-02-19 PROCEDURE — 1159F MED LIST DOCD IN RCRD: CPT | Mod: CPTII,,, | Performed by: INTERNAL MEDICINE

## 2024-02-19 PROCEDURE — 1160F RVW MEDS BY RX/DR IN RCRD: CPT | Mod: CPTII,,, | Performed by: INTERNAL MEDICINE

## 2024-02-19 PROCEDURE — 3077F SYST BP >= 140 MM HG: CPT | Mod: CPTII,,, | Performed by: INTERNAL MEDICINE

## 2024-02-19 PROCEDURE — 99215 OFFICE O/P EST HI 40 MIN: CPT | Mod: S$PBB,,, | Performed by: INTERNAL MEDICINE

## 2024-02-19 PROCEDURE — 3080F DIAST BP >= 90 MM HG: CPT | Mod: CPTII,,, | Performed by: INTERNAL MEDICINE

## 2024-02-19 PROCEDURE — 93248 EXT ECG>7D<15D REV&INTERPJ: CPT | Mod: ,,, | Performed by: INTERNAL MEDICINE

## 2024-02-19 PROCEDURE — 3288F FALL RISK ASSESSMENT DOCD: CPT | Mod: CPTII,,, | Performed by: INTERNAL MEDICINE

## 2024-02-19 PROCEDURE — 1100F PTFALLS ASSESS-DOCD GE2>/YR: CPT | Mod: CPTII,,, | Performed by: INTERNAL MEDICINE

## 2024-02-19 PROCEDURE — 99213 OFFICE O/P EST LOW 20 MIN: CPT | Mod: PBBFAC | Performed by: INTERNAL MEDICINE

## 2024-02-19 RX ORDER — SIMVASTATIN 40 MG/1
40 TABLET, FILM COATED ORAL NIGHTLY
COMMUNITY
Start: 2024-02-15

## 2024-02-20 PROBLEM — G47.33 OSA (OBSTRUCTIVE SLEEP APNEA): Chronic | Status: ACTIVE | Noted: 2024-02-20

## 2024-02-20 PROBLEM — I44.1 AV BLOCK, MOBITZ II: Chronic | Status: ACTIVE | Noted: 2024-02-20

## 2024-02-20 RX ORDER — DIPHENHYDRAMINE HCL 50 MG
50 CAPSULE ORAL
Status: CANCELLED | OUTPATIENT
Start: 2024-03-05

## 2024-02-20 RX ORDER — SODIUM CHLORIDE 450 MG/100ML
INJECTION, SOLUTION INTRAVENOUS CONTINUOUS
Status: CANCELLED | OUTPATIENT
Start: 2024-03-05

## 2024-02-20 RX ORDER — DIAZEPAM 5 MG/1
5 TABLET ORAL
Status: CANCELLED | OUTPATIENT
Start: 2024-03-05

## 2024-02-20 NOTE — H&P (VIEW-ONLY)
PCP: Jennifer Zurita MD    Referring Provider:     Subjective:   Дмитрий Collazo is a 78 y.o. female with hx of HTN, HLD, and NIDDM who presents for test results.      1/22/24--Дмитрий Collazo is a 78 y.o. female with hx of HTN, HLD, and NIDDM who presents for abnormal heart rate (slow).      8/10/23--Dr. Gomez--Дмитрий Collazo is a 78 y.o. female who presents for evaluation of orthostatic syncope, hypertension.  She notes blood pressure was not well controlled, presented to the ER with headache and elevated blood pressure, was started on Toprol XL 25 mg q d,   She is monitoring her blood pressure at home, notes is much better controlled, headaches have resolved. She also notes symptoms have resolved with much more careful positional changes, counting to ten before she tries to ambulate and wearing compression stocking. She reports is able to perform normal activities of normal living without provocation of chest pain, pressure or shortness of breath.   She notes episodes of dizziness has resolved.                                                                               Fhx:  Family History   Problem Relation Age of Onset    Hypertension Mother     Stroke Mother     Hypertension Father     Heart disease Sister     Diabetes Mellitus Sister     Diabetes Mellitus Sister      Shx:   Social History     Socioeconomic History    Marital status: Unknown   Tobacco Use    Smoking status: Never    Smokeless tobacco: Never   Substance and Sexual Activity    Alcohol use: Never    Drug use: Never    Sexual activity: Not Currently       EKG  1/22/24--sinus bradycardia, ST and T wave abn, 54 bpm     ECHO:  2/2/24--Interpretation Summary    Left Ventricle: The left ventricle is normal in size. There is mild concentric hypertrophy. There is normal systolic function with a visually estimated ejection fraction of 55 - 70%. Ejection fraction by visual approximation is 55%.    Right Ventricle: Normal right ventricular cavity size.     Aortic Valve: The aortic valve is a trileaflet valve.    Mitral Valve: There is mild bileaflet sclerosis. There is no stenosis. The mean pressure gradient across the mitral valve is 1 mmHg at a heart rate of  bpm. There is moderate regurgitation with an eccentric jet.    Tricuspid Valve: There is mild regurgitation.    Pulmonic Valve: There is mild regurgitation.    IVC/SVC: Elevated venous pressure at 15 mmHg.    Pericardium: There is a trivial effusion.      3/31/23--Atrial fibrillation observed.  The left ventricle is normal in size with normal systolic function.  The estimated ejection fraction is 60%.  Normal right ventricular size with normal right ventricular systolic function.  Mild left atrial enlargement.  Mild aortic regurgitation.  Moderate mitral regurgitation.  Mild tricuspid regurgitation.  Normal central venous pressure (3 mmHg).     Lexiscan:  4/13/23--Normal. EF 76%.    Zio:  1/22/24-2/2/24--Basic rhythm is sinus, avg HR 65, 1st degree AVB. Min HR 36@ 05:56 AM; Max  @ 6:41 PM. Occasional PAC with rare couplets and triplets. 99 runs SVT, longest 12.4 sec (106). Second degree Mobitz II AV block, HR 36 with 2:1 AV block.  Rare PVC's.     4/6/23-4/13/23--Predominantly NSR. Short runs of asymptomatic SVT.  Very rare PAC's, PVC's.  Rare bigeminy, trigeminy.  No significant pauses.  No pt triggered events.         Current Outpatient Medications:     cloNIDine (CATAPRES) 0.1 MG tablet, , Disp: , Rfl:     gabapentin (NEURONTIN) 300 MG capsule, Take 300 mg by mouth 3 (three) times daily., Disp: , Rfl:     irbesartan-hydrochlorothiazide (AVALIDE) 300-12.5 mg per tablet, Take by mouth., Disp: , Rfl:     levothyroxine (SYNTHROID) 100 MCG tablet, Take by mouth., Disp: , Rfl:     metFORMIN (GLUCOPHAGE) 1000 MG tablet, Take 1,000 mg by mouth 2 (two) times daily., Disp: , Rfl:     metoprolol succinate (TOPROL-XL) 25 MG 24 hr tablet, Take 1 tablet (25 mg total) by mouth once daily., Disp: 30 tablet, Rfl: 5     "sertraline (ZOLOFT) 50 MG tablet, , Disp: , Rfl:     simvastatin (ZOCOR) 40 MG tablet, Take 40 mg by mouth every evening., Disp: , Rfl:     clopidogreL (PLAVIX) 75 mg tablet, Take by mouth., Disp: , Rfl:   Did not bring medications.     Review of Systems   Respiratory:  Positive for shortness of breath.    Cardiovascular:  Positive for chest pain. Negative for palpitations and leg swelling.   Neurological:  Positive for dizziness. Negative for loss of consciousness.           Objective:   BP (!) 228/96 (BP Location: Left arm, Patient Position: Sitting)   Pulse (!) 51   Ht 5' 1" (1.549 m)   Wt 70.2 kg (154 lb 12.8 oz)   SpO2 (!) 94%   BMI 29.25 kg/m²     Physical Exam  Vitals reviewed.   Constitutional:       General: She is not in acute distress.     Comments: Looks tired, sleepy in wheelchair   HENT:      Head: Normocephalic and atraumatic.   Neck:      Vascular: No carotid bruit or JVD.   Cardiovascular:      Rate and Rhythm: Normal rate and regular rhythm.      Pulses: Normal pulses.      Heart sounds: Normal heart sounds. No murmur heard.  Pulmonary:      Effort: Pulmonary effort is normal.      Breath sounds: Normal breath sounds.   Skin:     General: Skin is warm and dry.   Neurological:      General: No focal deficit present.      Mental Status: She is alert.           Assessment:     1. AV block, Mobitz II  Basic Metabolic Panel    CBC Auto Differential    Case Request-Cath Lab: INSERTION, CARDIAC PACEMAKER, DUAL CHAMBER    Height and weight    Void on call to Lab    Site prep    Vital signs    Chlorhexidine (CHG) 2% Wipes    Notify physician (specify)    Diet NPO    EKG 12-lead    Place in Outpatient    Insert peripheral IV    0.45% NaCl infusion    diazePAM tablet 5 mg    diphenhydrAMINE capsule 50 mg    Case Request-Cath Lab: INSERTION, CARDIAC PACEMAKER, DUAL CHAMBER    with syncope      2. Pre-operative laboratory examination  Basic Metabolic Panel    CBC Auto Differential      3. Essential " hypertension  Basic Metabolic Panel    CBC Auto Differential      4. Mixed hyperlipidemia        5. Diabetes mellitus without complication        6. EDGAR (obstructive sleep apnea)              Plan:   Discussed test results  Schedule for dual chamber PPM.  R/B/A were explained.   F/u after PPM implant.

## 2024-03-05 ENCOUNTER — HOSPITAL ENCOUNTER (OUTPATIENT)
Facility: HOSPITAL | Age: 79
Discharge: HOME | End: 2024-03-05
Attending: INTERNAL MEDICINE | Admitting: INTERNAL MEDICINE
Payer: MEDICARE

## 2024-03-05 VITALS
DIASTOLIC BLOOD PRESSURE: 76 MMHG | OXYGEN SATURATION: 94 % | HEIGHT: 61 IN | SYSTOLIC BLOOD PRESSURE: 148 MMHG | RESPIRATION RATE: 16 BRPM | BODY MASS INDEX: 29.07 KG/M2 | HEART RATE: 67 BPM | WEIGHT: 154 LBS | TEMPERATURE: 98 F

## 2024-03-05 DIAGNOSIS — Z01.810 PRE-OPERATIVE CARDIOVASCULAR EXAMINATION: ICD-10-CM

## 2024-03-05 DIAGNOSIS — R55 SYNCOPE AND COLLAPSE: ICD-10-CM

## 2024-03-05 DIAGNOSIS — Z95.0 PRESENCE OF CARDIAC PACEMAKER: Primary | ICD-10-CM

## 2024-03-05 DIAGNOSIS — R00.1 BRADYCARDIA: ICD-10-CM

## 2024-03-05 DIAGNOSIS — R94.31 NONSPECIFIC ABNORMAL ELECTROCARDIOGRAM (ECG) (EKG): ICD-10-CM

## 2024-03-05 DIAGNOSIS — I44.1 AV BLOCK, MOBITZ II: Primary | ICD-10-CM

## 2024-03-05 DIAGNOSIS — I44.1 AV BLOCK, MOBITZ II: Chronic | ICD-10-CM

## 2024-03-05 LAB
OHS QRS DURATION: 78 MS
OHS QTC CALCULATION: 418 MS

## 2024-03-05 PROCEDURE — 99153 MOD SED SAME PHYS/QHP EA: CPT | Performed by: INTERNAL MEDICINE

## 2024-03-05 PROCEDURE — 25000003 PHARM REV CODE 250

## 2024-03-05 PROCEDURE — 99152 MOD SED SAME PHYS/QHP 5/>YRS: CPT | Performed by: INTERNAL MEDICINE

## 2024-03-05 PROCEDURE — 93010 ELECTROCARDIOGRAM REPORT: CPT | Mod: ,,, | Performed by: INTERNAL MEDICINE

## 2024-03-05 PROCEDURE — 63600175 PHARM REV CODE 636 W HCPCS: Performed by: INTERNAL MEDICINE

## 2024-03-05 PROCEDURE — 93005 ELECTROCARDIOGRAM TRACING: CPT

## 2024-03-05 PROCEDURE — C1894 INTRO/SHEATH, NON-LASER: HCPCS | Performed by: INTERNAL MEDICINE

## 2024-03-05 PROCEDURE — 33208 INSRT HEART PM ATRIAL & VENT: CPT | Mod: KX,,, | Performed by: INTERNAL MEDICINE

## 2024-03-05 PROCEDURE — C1785 PMKR, DUAL, RATE-RESP: HCPCS | Performed by: INTERNAL MEDICINE

## 2024-03-05 PROCEDURE — C1898 LEAD, PMKR, OTHER THAN TRANS: HCPCS | Performed by: INTERNAL MEDICINE

## 2024-03-05 PROCEDURE — 25000003 PHARM REV CODE 250: Performed by: INTERNAL MEDICINE

## 2024-03-05 PROCEDURE — 63600175 PHARM REV CODE 636 W HCPCS

## 2024-03-05 PROCEDURE — 33208 INSRT HEART PM ATRIAL & VENT: CPT | Mod: KX | Performed by: INTERNAL MEDICINE

## 2024-03-05 PROCEDURE — 99152 MOD SED SAME PHYS/QHP 5/>YRS: CPT | Mod: ,,, | Performed by: INTERNAL MEDICINE

## 2024-03-05 DEVICE — LEAD 5076-52 MRI US RCMCRD
Type: IMPLANTABLE DEVICE | Site: CHEST | Status: FUNCTIONAL
Brand: CAPSUREFIX NOVUS MRI™ SURESCAN®

## 2024-03-05 DEVICE — IPG W1DR01 AZURE XT DR MRI USA
Type: IMPLANTABLE DEVICE | Site: CHEST | Status: FUNCTIONAL
Brand: AZURE™ XT DR MRI SURESCAN™

## 2024-03-05 DEVICE — LEAD 5076-58 MRI US RCMCRD
Type: IMPLANTABLE DEVICE | Site: CHEST | Status: FUNCTIONAL
Brand: CAPSUREFIX NOVUS MRI™ SURESCAN®

## 2024-03-05 RX ORDER — DIPHENHYDRAMINE HCL 25 MG
50 CAPSULE ORAL
Status: DISCONTINUED | OUTPATIENT
Start: 2024-03-05 | End: 2024-03-05 | Stop reason: HOSPADM

## 2024-03-05 RX ORDER — CLONIDINE HYDROCHLORIDE 0.1 MG/1
0.2 TABLET ORAL ONCE
Status: COMPLETED | OUTPATIENT
Start: 2024-03-05 | End: 2024-03-05

## 2024-03-05 RX ORDER — FENTANYL CITRATE 50 UG/ML
INJECTION, SOLUTION INTRAMUSCULAR; INTRAVENOUS
Status: DISCONTINUED | OUTPATIENT
Start: 2024-03-05 | End: 2024-03-05 | Stop reason: HOSPADM

## 2024-03-05 RX ORDER — AMLODIPINE BESYLATE 10 MG/1
10 TABLET ORAL ONCE
Status: COMPLETED | OUTPATIENT
Start: 2024-03-05 | End: 2024-03-05

## 2024-03-05 RX ORDER — SODIUM CHLORIDE 9 MG/ML
INJECTION, SOLUTION INTRAVENOUS
Status: DISCONTINUED | OUTPATIENT
Start: 2024-03-05 | End: 2024-03-05 | Stop reason: HOSPADM

## 2024-03-05 RX ORDER — HYDRALAZINE HYDROCHLORIDE 20 MG/ML
INJECTION INTRAMUSCULAR; INTRAVENOUS
Status: DISCONTINUED | OUTPATIENT
Start: 2024-03-05 | End: 2024-03-05 | Stop reason: HOSPADM

## 2024-03-05 RX ORDER — VANCOMYCIN/0.9 % SOD CHLORIDE 1 G/100 ML
PLASTIC BAG, INJECTION (ML) INTRAVENOUS
Status: DISCONTINUED | OUTPATIENT
Start: 2024-03-05 | End: 2024-03-05 | Stop reason: HOSPADM

## 2024-03-05 RX ORDER — ACETAMINOPHEN 500 MG
1000 TABLET ORAL ONCE
Status: COMPLETED | OUTPATIENT
Start: 2024-03-05 | End: 2024-03-05

## 2024-03-05 RX ORDER — LIDOCAINE HYDROCHLORIDE 10 MG/ML
INJECTION INFILTRATION; PERINEURAL
Status: DISCONTINUED | OUTPATIENT
Start: 2024-03-05 | End: 2024-03-05 | Stop reason: HOSPADM

## 2024-03-05 RX ORDER — DIAZEPAM 5 MG/1
5 TABLET ORAL
Status: DISCONTINUED | OUTPATIENT
Start: 2024-03-05 | End: 2024-03-05 | Stop reason: HOSPADM

## 2024-03-05 RX ORDER — SODIUM CHLORIDE 450 MG/100ML
INJECTION, SOLUTION INTRAVENOUS CONTINUOUS
Status: DISCONTINUED | OUTPATIENT
Start: 2024-03-05 | End: 2024-03-05 | Stop reason: HOSPADM

## 2024-03-05 RX ORDER — MIDAZOLAM HYDROCHLORIDE 1 MG/ML
INJECTION INTRAMUSCULAR; INTRAVENOUS
Status: DISCONTINUED | OUTPATIENT
Start: 2024-03-05 | End: 2024-03-05 | Stop reason: HOSPADM

## 2024-03-05 RX ADMIN — CLONIDINE HYDROCHLORIDE 0.2 MG: 0.1 TABLET ORAL at 08:03

## 2024-03-05 RX ADMIN — DIPHENHYDRAMINE HYDROCHLORIDE 50 MG: 25 CAPSULE ORAL at 08:03

## 2024-03-05 RX ADMIN — DIAZEPAM 5 MG: 5 TABLET ORAL at 08:03

## 2024-03-05 RX ADMIN — CLONIDINE HYDROCHLORIDE 0.2 MG: 0.1 TABLET ORAL at 02:03

## 2024-03-05 RX ADMIN — ACETAMINOPHEN 1000 MG: 500 TABLET ORAL at 02:03

## 2024-03-05 RX ADMIN — AMLODIPINE BESYLATE 10 MG: 5 TABLET ORAL at 02:03

## 2024-03-05 NOTE — NURSING
BP rechecked. Trending down. Patient with eyes closed resting in bed. NADN. Arouses to verbal stimuli. Patient updated and awaiting procedure start.

## 2024-03-05 NOTE — NURSING
"Dr. Luciano made aware of elevated Bp. Patient states this is her "normal" and is asymptomatic. New order rec'd to give 0.2 mg Clonidine PO  "

## 2024-03-05 NOTE — DISCHARGE INSTRUCTIONS
WEAR IMMOBILIZER UNTIL RETURN APPOINTMENT  IN ONE WEEK  KEEP BANDAGE CLEAN, DRY, AND INTACT. DO NOT REMOVE AND DON'T GET WET.  NO HEAVY LIFTING OR STRENUOUS ACTIVITY FOR ONE WEEK.

## 2024-03-05 NOTE — DISCHARGE SUMMARY
Дмитрий valentine was admitted today for elective implant of dual-chamber permanent pacemaker due to second-degree Mobitz 2 atrioventricular block and history of syncope and collapse.  She had a successful implant of a dual-chamber permanent pacemaker via the left subclavian vein under fluoroscopic guidance.  She had an uncomplicated postprocedure course.  Please see the op report for details.  I feel she is now reached maximum hospital benefit and is ready for discharge home.      Impression:  Successful dual-chamber permanent pacemaker implant in a patient with second-degree Mobitz 2 atrioventricular block.      Plan:    1. Left shoulder immobilizer for 1 week.  Keep device bandage clean and dry until seen in pacemaker clinic in 1 week.  No driving for 48 hours.    2. Follow up in Cardiology Clinic in 4-6 weeks.  Follow up in pacemaker clinic in 1 week.

## 2024-03-13 ENCOUNTER — CLINICAL SUPPORT (OUTPATIENT)
Dept: CARDIOLOGY | Facility: CLINIC | Age: 79
End: 2024-03-13
Payer: MEDICARE

## 2024-03-13 ENCOUNTER — HOSPITAL ENCOUNTER (OUTPATIENT)
Dept: CARDIOLOGY | Facility: HOSPITAL | Age: 79
Discharge: HOME | End: 2024-03-13
Attending: INTERNAL MEDICINE
Payer: MEDICARE

## 2024-03-13 DIAGNOSIS — Z95.0 CARDIAC PACEMAKER IN SITU: Primary | ICD-10-CM

## 2024-03-13 DIAGNOSIS — Z95.0 PRESENCE OF CARDIAC PACEMAKER: ICD-10-CM

## 2024-03-13 PROCEDURE — 99211 OFF/OP EST MAY X REQ PHY/QHP: CPT | Mod: PBBFAC,25

## 2024-03-13 PROCEDURE — 93280 PM DEVICE PROGR EVAL DUAL: CPT | Mod: 26,,, | Performed by: INTERNAL MEDICINE

## 2024-03-13 PROCEDURE — 93280 PM DEVICE PROGR EVAL DUAL: CPT

## 2024-03-13 NOTE — PROGRESS NOTES
Dressing removed. Site intact, bruising noted. No drainage noted along site or around dressing. Site cleaned with alcohol, allowed to dry, staples removed intact. Site cleaned with alcohol, allowed to dry, steri strips applied.

## 2024-04-04 ENCOUNTER — OFFICE VISIT (OUTPATIENT)
Dept: CARDIOLOGY | Facility: CLINIC | Age: 79
End: 2024-04-04
Payer: MEDICARE

## 2024-04-04 ENCOUNTER — HOSPITAL ENCOUNTER (OUTPATIENT)
Dept: CARDIOLOGY | Facility: HOSPITAL | Age: 79
Discharge: HOME | End: 2024-04-04
Attending: INTERNAL MEDICINE
Payer: MEDICARE

## 2024-04-04 VITALS
HEIGHT: 61 IN | DIASTOLIC BLOOD PRESSURE: 110 MMHG | SYSTOLIC BLOOD PRESSURE: 150 MMHG | WEIGHT: 149.81 LBS | BODY MASS INDEX: 28.28 KG/M2 | OXYGEN SATURATION: 96 % | HEART RATE: 76 BPM

## 2024-04-04 DIAGNOSIS — Z95.0 PRESENCE OF CARDIAC PACEMAKER: ICD-10-CM

## 2024-04-04 DIAGNOSIS — Z95.0 PRESENCE OF CARDIAC PACEMAKER: Primary | ICD-10-CM

## 2024-04-04 DIAGNOSIS — I44.1 AV BLOCK, MOBITZ II: Chronic | ICD-10-CM

## 2024-04-04 DIAGNOSIS — E78.5 HYPERLIPIDEMIA, UNSPECIFIED HYPERLIPIDEMIA TYPE: Primary | ICD-10-CM

## 2024-04-04 DIAGNOSIS — I10 ESSENTIAL HYPERTENSION: ICD-10-CM

## 2024-04-04 PROBLEM — R55 SYNCOPE AND COLLAPSE: Status: RESOLVED | Noted: 2023-04-29 | Resolved: 2024-04-04

## 2024-04-04 PROBLEM — R00.1 BRADYCARDIA: Status: RESOLVED | Noted: 2023-04-29 | Resolved: 2024-04-04

## 2024-04-04 PROCEDURE — 1126F AMNT PAIN NOTED NONE PRSNT: CPT | Mod: CPTII,,, | Performed by: NURSE PRACTITIONER

## 2024-04-04 PROCEDURE — 1101F PT FALLS ASSESS-DOCD LE1/YR: CPT | Mod: CPTII,,, | Performed by: NURSE PRACTITIONER

## 2024-04-04 PROCEDURE — 99214 OFFICE O/P EST MOD 30 MIN: CPT | Mod: S$PBB,,, | Performed by: NURSE PRACTITIONER

## 2024-04-04 PROCEDURE — 1159F MED LIST DOCD IN RCRD: CPT | Mod: CPTII,,, | Performed by: NURSE PRACTITIONER

## 2024-04-04 PROCEDURE — 3080F DIAST BP >= 90 MM HG: CPT | Mod: CPTII,,, | Performed by: NURSE PRACTITIONER

## 2024-04-04 PROCEDURE — 3077F SYST BP >= 140 MM HG: CPT | Mod: CPTII,,, | Performed by: NURSE PRACTITIONER

## 2024-04-04 PROCEDURE — 1160F RVW MEDS BY RX/DR IN RCRD: CPT | Mod: CPTII,,, | Performed by: NURSE PRACTITIONER

## 2024-04-04 PROCEDURE — 93280 PM DEVICE PROGR EVAL DUAL: CPT | Mod: 26,,, | Performed by: INTERNAL MEDICINE

## 2024-04-04 PROCEDURE — 3288F FALL RISK ASSESSMENT DOCD: CPT | Mod: CPTII,,, | Performed by: NURSE PRACTITIONER

## 2024-04-04 PROCEDURE — 93280 PM DEVICE PROGR EVAL DUAL: CPT

## 2024-04-04 PROCEDURE — 99215 OFFICE O/P EST HI 40 MIN: CPT | Mod: PBBFAC,25 | Performed by: NURSE PRACTITIONER

## 2024-04-04 NOTE — ASSESSMENT & PLAN NOTE
Patient forgot to take her meds today. She forgot.   Vitals:    04/04/24 1346 04/04/24 1406 04/04/24 1511 04/04/24 1540   BP: (!) 188/120 (!) 180/120 (!) 154/110 (!) 150/110   BP Location: Left arm      Patient Position: Sitting      Pulse:       SpO2:       Weight:       Height:          She is asymptomatic.  Patient will take the remainder of her meds when she gets home. She will call tomorrow with her BP.

## 2024-04-04 NOTE — ASSESSMENT & PLAN NOTE
Lab Results   Component Value Date    CHOL 196 02/02/2024     Lab Results   Component Value Date    HDL 48 02/02/2024     Lab Results   Component Value Date    LDLCALC 109 02/02/2024     Lab Results   Component Value Date    TRIG 197 (H) 02/02/2024       Lab Results   Component Value Date    CHOLHDL 4.1 02/02/2024

## 2024-04-04 NOTE — PROGRESS NOTES
No medications today taken by patient.   Per MEREDITH Aguayo- clonidine 0.1, 1 tablet by mouth, to be given.     Clonidine 0.1mg, Exp: 08/25, Lot: [id number]-- not given due to dropping on floor. Wasted in pixis.    Clonidine 0.1mg, Exp: 12/25, Lot: [id number]- given at 1:33pm.    Clonidine 0.1mg, Exp: 12/25, Lot: [id number]- given at 2:41pm.      3:40pm-- per MEREDITH Aguayo okay to let pt go home and take ALL of her BP medications, wait 1 hour, and sit 20 min then take BP. Tell family to call us with BP readings. Pt and family notified and states they will let us know in the morning how her BP is running.

## 2024-04-04 NOTE — PROGRESS NOTES
PCP: Jennifer Zurita MD    Referring Provider:     Chief Complaint   Patient presents with    Hyperlipidemia    Hypertension    Chest Pain     Chest pain yesterday while washing dishes    Shortness of Breath         Subjective:   Дмитрий Collazo is a 78 y.o. female with hx of syncope, Mobitz II, AV block s/p ppm, HTN, DM, and HLD,  who presents for HD follow up s/p elective dual chamber implantation by Dr. Luciano on 3/5/2024.   The patient present and states that she is doing well. She has had some discomfort at the ppm site. It is well healed and the incision is well approximated with out redness, edema or drainage. She forgot to take her meds this am and her bp is elevated. Please nurses notes.     1/22/24--(Dr. Luciano) Дмитрий Collazo is a 78 y.o. female with hx of HTN, HLD, and NIDDM who presents for abnormal heart rate (slow).       8/10/23--Dr. Gomez--Дмитрий Collazo is a 78 y.o. female who presents for evaluation of orthostatic syncope, hypertension.  She notes blood pressure was not well controlled, presented to the ER with headache and elevated blood pressure, was started on Toprol XL 25 mg q d,   She is monitoring her blood pressure at home, notes is much better controlled, headaches have resolved. She also notes symptoms have resolved with much more careful positional changes, counting to ten before she tries to ambulate and wearing compression stocking. She reports is able to perform normal activities of normal living without provocation of chest pain, pressure or shortness of breath.   She notes episodes of dizziness has resolved.     Fhx:  Family History   Problem Relation Age of Onset    Hypertension Mother     Stroke Mother     Hypertension Father     Heart disease Sister     Diabetes Mellitus Sister     Diabetes Mellitus Sister      Shx:   Social History     Socioeconomic History    Marital status: Unknown   Tobacco Use    Smoking status: Never    Smokeless tobacco: Never   Substance and Sexual  Activity    Alcohol use: Never    Drug use: Never    Sexual activity: Not Currently       EKG   3/5/2024 Accelerated Junctional rhythm Nonspecific ST and T wave abnormality Abnormal ECG When compared with ECG of 22-JAAN-2024 14:35, Junctional rhythm has replaced Sinus rhythm Nonspecific T wave abnormality, improved in Anterior-lateral leads Confirmed by Ricci Luciano MD ([id number]) on 3/5/2024 8:40:46  1/22/24--sinus bradycardia, ST and T wave abn, 54 bpm    ECHO Results for orders placed during the hospital encounter of 02/02/24    Echo    Interpretation Summary    Left Ventricle: The left ventricle is normal in size. There is mild concentric hypertrophy. There is normal systolic function with a visually estimated ejection fraction of 55 - 70%. Ejection fraction by visual approximation is 55%.    Right Ventricle: Normal right ventricular cavity size.    Aortic Valve: The aortic valve is a trileaflet valve.    Mitral Valve: There is mild bileaflet sclerosis. There is no stenosis. The mean pressure gradient across the mitral valve is 1 mmHg at a heart rate of  bpm. There is moderate regurgitation with an eccentric jet.    Tricuspid Valve: There is mild regurgitation.    Pulmonic Valve: There is mild regurgitation.    IVC/SVC: Elevated venous pressure at 15 mmHg.    Pericardium: There is a trivial effusion.    [facility] No results found for this or any previous visit.        Lab Results   Component Value Date     02/19/2024    K 4.2 02/19/2024     02/19/2024    CO2 29 02/19/2024    BUN 30 (H) 02/19/2024    CREATININE 1.46 (H) 02/19/2024    CALCIUM 9.6 02/19/2024    ANIONGAP 10 02/19/2024       Lab Results   Component Value Date    CHOL 196 02/02/2024     Lab Results   Component Value Date    HDL 48 02/02/2024     Lab Results   Component Value Date    LDLCALC 109 02/02/2024     Lab Results   Component Value Date    TRIG 197 (H) 02/02/2024     Lab Results   Component Value Date    CHOLHDL 4.1 02/02/2024       Lab  "Results   Component Value Date    WBC 7.32 02/19/2024    HGB 13.3 02/19/2024    HCT 38.6 02/19/2024    MCV 92.1 02/19/2024     02/19/2024           Current Outpatient Medications:     cloNIDine (CATAPRES) 0.1 MG tablet, , Disp: , Rfl:     clopidogreL (PLAVIX) 75 mg tablet, Take by mouth., Disp: , Rfl:     irbesartan-hydrochlorothiazide (AVALIDE) 300-12.5 mg per tablet, Take by mouth., Disp: , Rfl:     levothyroxine (SYNTHROID) 100 MCG tablet, Take by mouth., Disp: , Rfl:     metFORMIN (GLUCOPHAGE) 1000 MG tablet, Take 1,000 mg by mouth 2 (two) times daily., Disp: , Rfl:     metoprolol succinate (TOPROL-XL) 25 MG 24 hr tablet, Take 1 tablet (25 mg total) by mouth once daily., Disp: 30 tablet, Rfl: 5    sertraline (ZOLOFT) 50 MG tablet, , Disp: , Rfl:     simvastatin (ZOCOR) 40 MG tablet, Take 40 mg by mouth every evening., Disp: , Rfl:   Meds reviewed but not reconciled. She did not bring her meds.      Review of Systems   Respiratory:  Positive for shortness of breath.    Cardiovascular:  Positive for chest pain. Negative for palpitations, orthopnea, claudication, leg swelling and PND.        Chest discomfort around ppm site    Neurological:  Negative for dizziness, loss of consciousness and weakness.       Vitals:    04/04/24 1346 04/04/24 1406 04/04/24 1511 04/04/24 1540   BP: (!) 188/120 (!) 180/120 (!) 154/110 (!) 150/110   BP Location: Left arm      Patient Position: Sitting      Pulse:       SpO2:       Weight:       Height:            Objective:   BP (!) 150/110   Pulse 76   Ht 5' 1" (1.549 m)   Wt 67.9 kg (149 lb 12.8 oz)   SpO2 96%   BMI 28.30 kg/m²     Physical Exam  Vitals and nursing note reviewed.   Constitutional:       Appearance: Normal appearance. She is normal weight.   HENT:      Head: Normocephalic and atraumatic.   Neck:      Vascular: No carotid bruit.   Cardiovascular:      Rate and Rhythm: Normal rate and regular rhythm.      Pulses: Normal pulses.      Heart sounds: Normal heart " sounds.      Comments: Well healed ppm site to left upper chest wall  Pulmonary:      Effort: Pulmonary effort is normal.      Breath sounds: Normal breath sounds.   Abdominal:      Palpations: Abdomen is soft.   Musculoskeletal:      Cervical back: Neck supple.      Right lower leg: No edema.      Left lower leg: No edema.   Skin:     General: Skin is warm and dry.      Capillary Refill: Capillary refill takes less than 2 seconds.   Neurological:      General: No focal deficit present.      Mental Status: She is alert.           Assessment:     1. Hyperlipidemia, unspecified hyperlipidemia type        2. AV block, Mobitz II        3. Essential hypertension              Plan:   Hyperlipidemia  Lab Results   Component Value Date    CHOL 196 02/02/2024     Lab Results   Component Value Date    HDL 48 02/02/2024     Lab Results   Component Value Date    LDLCALC 109 02/02/2024     Lab Results   Component Value Date    TRIG 197 (H) 02/02/2024       Lab Results   Component Value Date    CHOLHDL 4.1 02/02/2024         AV block, Mobitz II  S/p  dual chamber ppm placement 3/5/2024-  Dr. Luciano    Essential hypertension  Patient forgot to take her meds today. She forgot.   Vitals:    04/04/24 1346 04/04/24 1406 04/04/24 1511 04/04/24 1540   BP: (!) 188/120 (!) 180/120 (!) 154/110 (!) 150/110   BP Location: Left arm      Patient Position: Sitting      Pulse:       SpO2:       Weight:       Height:          She is asymptomatic.  Patient will take the remainder of her meds when she gets home. She will call tomorrow with her BP.       RTC: 2 months

## 2024-06-06 ENCOUNTER — TELEPHONE (OUTPATIENT)
Dept: CARDIOLOGY | Facility: CLINIC | Age: 79
End: 2024-06-06
Payer: MEDICARE

## 2024-06-06 NOTE — TELEPHONE ENCOUNTER
Pt daughter in law called stating pt just had brain surgery for brain bleed at [facility] then was transferred to [facility] for swing bed. She states she got out of the swing bed yesterday with a bp of 140/100 and they told her to contact us ASAP. After reviewing records from [place] and with Grace, pt daughter in law was told they need to contact the neurosurgeon in [place] because usually after brain surgery they have a range that they want her BP to stay within. She states pt sees them on 6/10 with another CT. She was told to go ahead and call them today so that they can help them before their appointment. She verbalized understanding. She will call us once pt is cleared from neurosurgery so we can get her scheduled for a follow up with us.     Daughter in law called back stating they state they would do 160-170/90 range, but they wanted her BP as normal as it could be. She states they gave pt clonidine to get BP down. She was told once she is completely cleared by neurosurgery, we will make her a follow up appointment with us. She verbalized understanding, and she will call us after appt on 6/10 if pt is cleared.

## 2024-06-13 ENCOUNTER — HOSPITAL ENCOUNTER (OUTPATIENT)
Dept: CARDIOLOGY | Facility: HOSPITAL | Age: 79
Discharge: HOME | End: 2024-06-13
Attending: INTERNAL MEDICINE
Payer: MEDICARE

## 2024-06-13 DIAGNOSIS — Z95.0 PRESENCE OF CARDIAC PACEMAKER: ICD-10-CM

## 2024-06-13 PROCEDURE — 93280 PM DEVICE PROGR EVAL DUAL: CPT | Mod: 26,,, | Performed by: INTERNAL MEDICINE

## 2024-06-13 PROCEDURE — 93280 PM DEVICE PROGR EVAL DUAL: CPT

## 2024-06-14 LAB
BATTERY VOLTAGE (V): 3.2 V
ERI (V): 2.63 V
OHS CV DC PP MS1: 0.4 MS
OHS CV DC PP MS2: 0.4 MS
OHS CV DC PP V1: 1.5 V
OHS CV DC PP V2: 2 V

## 2024-06-22 ENCOUNTER — HOSPITAL ENCOUNTER (EMERGENCY)
Facility: HOSPITAL | Age: 79
Discharge: HOME | End: 2024-06-22
Payer: MEDICARE

## 2024-06-22 VITALS
OXYGEN SATURATION: 95 % | TEMPERATURE: 98 F | SYSTOLIC BLOOD PRESSURE: 144 MMHG | HEIGHT: 61 IN | DIASTOLIC BLOOD PRESSURE: 71 MMHG | HEART RATE: 61 BPM | WEIGHT: 145 LBS | RESPIRATION RATE: 17 BRPM | BODY MASS INDEX: 27.38 KG/M2

## 2024-06-22 DIAGNOSIS — R05.8 COUGH PRODUCTIVE OF CLEAR SPUTUM: Primary | ICD-10-CM

## 2024-06-22 DIAGNOSIS — N39.0 URINARY TRACT INFECTION WITHOUT HEMATURIA, SITE UNSPECIFIED: ICD-10-CM

## 2024-06-22 LAB
ALBUMIN SERPL BCP-MCNC: 3 G/DL (ref 3.5–5)
ALBUMIN/GLOB SERPL: 0.8 {RATIO}
ALP SERPL-CCNC: 82 U/L (ref 55–142)
ALT SERPL W P-5'-P-CCNC: 8 U/L (ref 13–56)
ANION GAP SERPL CALCULATED.3IONS-SCNC: 16 MMOL/L (ref 7–16)
AST SERPL W P-5'-P-CCNC: 10 U/L (ref 15–37)
BACTERIA #/AREA URNS HPF: ABNORMAL /HPF
BASOPHILS # BLD AUTO: 0.03 K/UL (ref 0–0.2)
BASOPHILS NFR BLD AUTO: 0.4 % (ref 0–1)
BILIRUB SERPL-MCNC: 0.4 MG/DL (ref ?–1.2)
BILIRUB UR QL STRIP: NEGATIVE
BUN SERPL-MCNC: 37 MG/DL (ref 7–18)
BUN/CREAT SERPL: 26 (ref 6–20)
CALCIUM SERPL-MCNC: 9.3 MG/DL (ref 8.5–10.1)
CHLORIDE SERPL-SCNC: 106 MMOL/L (ref 98–107)
CLARITY UR: ABNORMAL
CO2 SERPL-SCNC: 20 MMOL/L (ref 21–32)
COLOR UR: YELLOW
CREAT SERPL-MCNC: 1.45 MG/DL (ref 0.55–1.02)
DIFFERENTIAL METHOD BLD: ABNORMAL
EGFR (NO RACE VARIABLE) (RUSH/TITUS): 36 ML/MIN/1.73M2
EOSINOPHIL # BLD AUTO: 0.06 K/UL (ref 0–0.5)
EOSINOPHIL NFR BLD AUTO: 0.7 % (ref 1–4)
ERYTHROCYTE [DISTWIDTH] IN BLOOD BY AUTOMATED COUNT: 12.7 % (ref 11.5–14.5)
GLOBULIN SER-MCNC: 3.7 G/DL (ref 2–4)
GLUCOSE SERPL-MCNC: 275 MG/DL (ref 74–106)
GLUCOSE UR STRIP-MCNC: 70 MG/DL
HCT VFR BLD AUTO: 30.6 % (ref 38–47)
HGB BLD-MCNC: 10.1 G/DL (ref 12–16)
IMM GRANULOCYTES # BLD AUTO: 0.02 K/UL (ref 0–0.04)
IMM GRANULOCYTES NFR BLD: 0.2 % (ref 0–0.4)
INFLUENZA A MOLECULAR (OHS): NEGATIVE
INFLUENZA B MOLECULAR (OHS): NEGATIVE
KETONES UR STRIP-SCNC: NEGATIVE MG/DL
LACTATE SERPL-SCNC: 1.8 MMOL/L (ref 0.4–2)
LEUKOCYTE ESTERASE UR QL STRIP: ABNORMAL
LYMPHOCYTES # BLD AUTO: 0.72 K/UL (ref 1–4.8)
LYMPHOCYTES NFR BLD AUTO: 8.5 % (ref 27–41)
MCH RBC QN AUTO: 29.4 PG (ref 27–31)
MCHC RBC AUTO-ENTMCNC: 33 G/DL (ref 32–36)
MCV RBC AUTO: 89.2 FL (ref 80–96)
MONOCYTES # BLD AUTO: 0.47 K/UL (ref 0–0.8)
MONOCYTES NFR BLD AUTO: 5.6 % (ref 2–6)
MPC BLD CALC-MCNC: 11.8 FL (ref 9.4–12.4)
MUCOUS, UA: ABNORMAL /LPF
NEUTROPHILS # BLD AUTO: 7.15 K/UL (ref 1.8–7.7)
NEUTROPHILS NFR BLD AUTO: 84.6 % (ref 53–65)
NITRITE UR QL STRIP: NEGATIVE
NRBC # BLD AUTO: 0 X10E3/UL
NRBC, AUTO (.00): 0 %
PH UR STRIP: 5.5 PH UNITS
PLATELET # BLD AUTO: 206 K/UL (ref 150–400)
POTASSIUM SERPL-SCNC: 3.5 MMOL/L (ref 3.5–5.1)
PROT SERPL-MCNC: 6.7 G/DL (ref 6.4–8.2)
PROT UR QL STRIP: 70
RBC # BLD AUTO: 3.43 M/UL (ref 4.2–5.4)
RBC # UR STRIP: ABNORMAL /UL
RBC #/AREA URNS HPF: <1 /HPF
SARS-COV-2 RDRP RESP QL NAA+PROBE: NEGATIVE
SODIUM SERPL-SCNC: 138 MMOL/L (ref 136–145)
SP GR UR STRIP: 1.02
SQUAMOUS #/AREA URNS LPF: ABNORMAL /HPF
UROBILINOGEN UR STRIP-ACNC: NORMAL MG/DL
WBC # BLD AUTO: 8.45 K/UL (ref 4.5–11)
WBC #/AREA URNS HPF: 22 /HPF

## 2024-06-22 PROCEDURE — 87502 INFLUENZA DNA AMP PROBE: CPT

## 2024-06-22 PROCEDURE — 87635 SARS-COV-2 COVID-19 AMP PRB: CPT

## 2024-06-22 PROCEDURE — 81003 URINALYSIS AUTO W/O SCOPE: CPT

## 2024-06-22 PROCEDURE — 80053 COMPREHEN METABOLIC PANEL: CPT

## 2024-06-22 PROCEDURE — 36415 COLL VENOUS BLD VENIPUNCTURE: CPT

## 2024-06-22 PROCEDURE — 99284 EMERGENCY DEPT VISIT MOD MDM: CPT | Mod: 25

## 2024-06-22 PROCEDURE — 87086 URINE CULTURE/COLONY COUNT: CPT

## 2024-06-22 PROCEDURE — 87077 CULTURE AEROBIC IDENTIFY: CPT

## 2024-06-22 PROCEDURE — 83605 ASSAY OF LACTIC ACID: CPT

## 2024-06-22 PROCEDURE — 85025 COMPLETE CBC W/AUTO DIFF WBC: CPT

## 2024-06-22 RX ORDER — SULFAMETHOXAZOLE AND TRIMETHOPRIM 800; 160 MG/1; MG/1
1 TABLET ORAL 2 TIMES DAILY
Qty: 20 TABLET | Refills: 0 | Status: SHIPPED | OUTPATIENT
Start: 2024-06-22 | End: 2024-07-02

## 2024-06-22 NOTE — ED PROVIDER NOTES
Encounter Date: 6/22/2024       History     Chief Complaint   Patient presents with    Weakness     Started last week. No eating.  Went to [facility] ER on Monday.  6 weeks post op craniotomy at [facility].  Went back to [facility] ER on Wed.  Dr on Thursday.  Fever x 3 days.       79-year old female presents to the emergency department with daughters for evaluation of weakness. Patient's daughters report that the patient became weak and did not want to participate in therapy one week ago. Further reports that the patient is normally ambulatory and actively participating in therapy. States that the patient is now preferring to stay in bed and has been running a fever and has had foul smelling urine. Denies headache, cough, congestion, runny nose, neck pain/stiffness, chest pain, shortness of breath, back pain, abdominal pain, diarrhea, constipation.    The history is provided by a relative. No  was used.   General Illness   The current episode started several days ago. The problem has been unchanged. The pain is at a severity of 0/10. Associated symptoms include a fever. Pertinent negatives include no abdominal pain, no constipation, no diarrhea, no nausea, no congestion, no ear pain, no headaches, no rhinorrhea, no sore throat, no stridor, no neck pain, no neck stiffness, no cough, no shortness of breath, no URI, no wheezing, no discharge, no pain and no eye redness.     Review of patient's allergies indicates:   Allergen Reactions    Celecoxib     Codeine     Penicillins      Past Medical History:   Diagnosis Date    CVA (cerebrovascular accident)     HTN (hypertension)     Mixed hyperlipidemia     Thyroiditis, unspecified     Type 2 diabetes mellitus with unspecified diabetic retinopathy without macular edema      Past Surgical History:   Procedure Laterality Date    A-V CARDIAC PACEMAKER INSERTION Left 03/05/2024    Procedure: INSERTION, CARDIAC PACEMAKER, DUAL CHAMBER;  Surgeon: Ricci Luciano MD;   Location: [facility] CATH LAB;  Service: Cardiology;  Laterality: Left;    BRAIN SURGERY      HYSTERECTOMY      KNEE SURGERY      TUBAL LIGATION       Family History   Problem Relation Name Age of Onset    Hypertension Mother      Stroke Mother      Hypertension Father      Heart disease Sister      Diabetes Mellitus Sister      Diabetes Mellitus Sister       Social History     Tobacco Use    Smoking status: Never    Smokeless tobacco: Never   Substance Use Topics    Alcohol use: Never    Drug use: Never     Review of Systems   Constitutional:  Positive for fever. Negative for chills.   HENT:  Negative for congestion, ear pain, rhinorrhea and sore throat.    Eyes:  Negative for pain, discharge and redness.   Respiratory:  Negative for cough, shortness of breath, wheezing and stridor.    Cardiovascular:  Negative for chest pain, palpitations and leg swelling.   Gastrointestinal:  Negative for abdominal pain, constipation, diarrhea and nausea.   Genitourinary:  Positive for dysuria. Negative for decreased urine volume and flank pain.   Musculoskeletal:  Negative for back pain, joint swelling, neck pain and neck stiffness.   Neurological:  Negative for dizziness and headaches.   Psychiatric/Behavioral:  Negative for confusion.    All other systems reviewed and are negative.      Physical Exam     Initial Vitals [06/22/24 1053]   BP Pulse Resp Temp SpO2   122/60 69 18 97.6 °F (36.4 °C) 96 %      MAP       --         Physical Exam    Constitutional: She appears well-nourished. No distress.   HENT:   Right Ear: Tympanic membrane normal.   Left Ear: Tympanic membrane normal.   Mouth/Throat: Uvula is midline, oropharynx is clear and moist and mucous membranes are normal. No trismus in the jaw. No uvula swelling. No oropharyngeal exudate.   Old surgical incision noted to right parietal lobe of head. Wound well approximated with no bleeding, drainage   Eyes: Right eye exhibits no discharge. Left eye exhibits no discharge.    Neck: Neck supple.   Normal range of motion.  Cardiovascular:  Normal rate, regular rhythm, normal heart sounds and intact distal pulses.           Pulmonary/Chest: Breath sounds normal. No respiratory distress. She has no wheezes.   Abdominal: Abdomen is soft. Bowel sounds are normal. She exhibits no distension. There is no abdominal tenderness.   Musculoskeletal:         General: No tenderness or edema. Normal range of motion.      Cervical back: Normal range of motion and neck supple.     Lymphadenopathy:     She has no cervical adenopathy.   Neurological: She is alert and oriented to person, place, and time. She has normal strength. No sensory deficit. GCS score is 15. GCS eye subscore is 4. GCS verbal subscore is 5. GCS motor subscore is 6.   Skin: Skin is warm and dry. Capillary refill takes less than 2 seconds.   Psychiatric: She has a normal mood and affect.         Medical Screening Exam   See Full Note    ED Course   Procedures  Labs Reviewed   COMPREHENSIVE METABOLIC PANEL - Abnormal; Notable for the following components:       Result Value    CO2 20 (*)     Glucose 275 (*)     BUN 37 (*)     Creatinine 1.45 (*)     BUN/Creatinine Ratio 26 (*)     Albumin 3.0 (*)     ALT 8 (*)     AST 10 (*)     eGFR 36 (*)     All other components within normal limits   URINALYSIS, REFLEX TO URINE CULTURE - Abnormal; Notable for the following components:    Leukocytes, UA Moderate (*)     Protein, UA 70 (*)     Glucose, UA 70 (*)     Blood, UA Trace (*)     All other components within normal limits   CBC WITH DIFFERENTIAL - Abnormal; Notable for the following components:    RBC 3.43 (*)     Hemoglobin 10.1 (*)     Hematocrit 30.6 (*)     Neutrophils % 84.6 (*)     Lymphocytes % 8.5 (*)     Eosinophils % 0.7 (*)     Lymphocytes, Absolute 0.72 (*)     All other components within normal limits   URINALYSIS, MICROSCOPIC - Abnormal; Notable for the following components:    WBC, UA 22 (*)     Bacteria, UA Many (*)      Squamous Epithelial Cells, UA Occasional (*)     Mucous Occasional (*)     All other components within normal limits   INFLUENZA A & B BY MOLECULAR - Normal   LACTIC ACID, PLASMA - Normal   SARS-COV-2 RNA AMPLIFICATION, QUAL - Normal    Narrative:     Negative SARS-CoV results should not be used as the sole basis for treatment or patient management decisions; negative results should be considered in the context of a patient's recent exposures, history and the presene of clinical signs and symptoms consistent with COVID-19.  Negative results should be treated as presumptive and confirmed by molecular assay, if necessary for patient management.   CULTURE, URINE   CBC W/ AUTO DIFFERENTIAL    Narrative:     The following orders were created for panel order CBC auto differential.  Procedure                               Abnormality         Status                     ---------                               -----------         ------                     CBC with Differential[[id number]]       Abnormal            Final result                 Please view results for these tests on the individual orders.          Imaging Results              X-Ray Chest 1 View (Final result)  Result time 06/22/24 13:14:50   Procedure changed from X-Ray Chest PA And Lateral     Final result by Osmar Goodwin DO (06/22/24 13:14:50)                   Impression:      As above      Electronically signed by: Osmar Goodwin  Date:    06/22/2024  Time:    13:14               Narrative:    EXAMINATION:  XR CHEST 1 VIEW    CLINICAL HISTORY:  cough;cough;Other specified cough    TECHNIQUE:  XR CHEST 1 VIEW    COMPARISON:  2024    FINDINGS:  No lines or tubes.    Prominence of the interstitial lung markings, correlate with fluid status    Normal pleura.    Cardiac silhouette is similar to comparison exam.    No obvious acute bone findings.                                       Medications - No data to display  Medical Decision Making  79-year  old female presents to the emergency department with daughters for evaluation of weakness. Patient's daughters report that the patient became weak and did not want to participate in therapy one week ago. Further reports that the patient is normally ambulatory and actively participating in therapy. States that the patient is now preferring to stay in bed and has been running a fever and has had foul smelling urine. Denies headache, cough, congestion, runny nose, neck pain/stiffness, chest pain, shortness of breath, back pain, abdominal pain, diarrhea, constipation.  Ordered and reviewed labs with results significant for urinary tract infection  Prescribed bactrim for discharge  Diagnosis: Urinary tract infection    Amount and/or Complexity of Data Reviewed  Labs: ordered. Decision-making details documented in ED Course.  Radiology: ordered.               ED Course as of 06/22/24 1401   Sat Jun 22, 2024   1310 Lymph %(!): 8.5 [JL]      ED Course User Index  [JL] Rico Gan NP                           Clinical Impression:   Final diagnoses:  [R05.8] Cough productive of clear sputum (Primary)  [N39.0] Urinary tract infection without hematuria, site unspecified        ED Disposition Condition    Discharge Stable          ED Prescriptions       Medication Sig Dispense Start Date End Date Auth. Provider    sulfamethoxazole-trimethoprim 800-160mg (BACTRIM DS) 800-160 mg Tab Take 1 tablet by mouth 2 (two) times daily. for 10 days 20 tablet 6/22/2024 7/2/2024 Rico Gan NP          Follow-up Information    None          Rico Gan, SKYLAR  06/22/24 1401

## 2024-06-22 NOTE — DISCHARGE INSTRUCTIONS
Take medication as ordered.  Take antibiotic with food, and drink plenty of water.  Follow up primary care provider in 2 days.  Return to the emergency department for new or worsening symptoms.

## 2024-06-22 NOTE — ED NOTES
Patient denies any pain, daughter reports decreased strength, generalized weakness and decreased appetite since last weekend.

## 2024-06-24 LAB — UA COMPLETE W REFLEX CULTURE PNL UR: ABNORMAL

## 2024-06-24 RX ORDER — CIPROFLOXACIN 500 MG/1
500 TABLET ORAL 2 TIMES DAILY
Qty: 14 TABLET | Refills: 0 | Status: SHIPPED | OUTPATIENT
Start: 2024-06-24 | End: 2024-07-01

## 2024-06-27 ENCOUNTER — TELEPHONE (OUTPATIENT)
Dept: EMERGENCY MEDICINE | Facility: HOSPITAL | Age: 79
End: 2024-06-27
Payer: MEDICARE

## 2024-06-27 NOTE — TELEPHONE ENCOUNTER
----- Message from CHARLENE Weber sent at 6/24/2024  9:42 AM CDT -----  Please notify the patient that I sent in a prescription to her pharmacy

## 2024-07-10 ENCOUNTER — OFFICE VISIT (OUTPATIENT)
Dept: CARDIOLOGY | Facility: CLINIC | Age: 79
End: 2024-07-10
Payer: MEDICARE

## 2024-07-10 VITALS
DIASTOLIC BLOOD PRESSURE: 68 MMHG | OXYGEN SATURATION: 96 % | HEIGHT: 61 IN | WEIGHT: 144 LBS | BODY MASS INDEX: 27.19 KG/M2 | SYSTOLIC BLOOD PRESSURE: 100 MMHG | HEART RATE: 68 BPM

## 2024-07-10 DIAGNOSIS — I10 ESSENTIAL HYPERTENSION: ICD-10-CM

## 2024-07-10 DIAGNOSIS — E78.5 HYPERLIPIDEMIA, UNSPECIFIED HYPERLIPIDEMIA TYPE: ICD-10-CM

## 2024-07-10 DIAGNOSIS — I44.1 AV BLOCK, MOBITZ II: Primary | Chronic | ICD-10-CM

## 2024-07-10 PROCEDURE — 1100F PTFALLS ASSESS-DOCD GE2>/YR: CPT | Mod: CPTII,,, | Performed by: NURSE PRACTITIONER

## 2024-07-10 PROCEDURE — 99214 OFFICE O/P EST MOD 30 MIN: CPT | Mod: S$PBB,,, | Performed by: NURSE PRACTITIONER

## 2024-07-10 PROCEDURE — 99215 OFFICE O/P EST HI 40 MIN: CPT | Mod: PBBFAC | Performed by: NURSE PRACTITIONER

## 2024-07-10 PROCEDURE — 1126F AMNT PAIN NOTED NONE PRSNT: CPT | Mod: CPTII,,, | Performed by: NURSE PRACTITIONER

## 2024-07-10 PROCEDURE — 99999 PR PBB SHADOW E&M-EST. PATIENT-LVL V: CPT | Mod: PBBFAC,,, | Performed by: NURSE PRACTITIONER

## 2024-07-10 PROCEDURE — 1159F MED LIST DOCD IN RCRD: CPT | Mod: CPTII,,, | Performed by: NURSE PRACTITIONER

## 2024-07-10 PROCEDURE — 3074F SYST BP LT 130 MM HG: CPT | Mod: CPTII,,, | Performed by: NURSE PRACTITIONER

## 2024-07-10 PROCEDURE — 3078F DIAST BP <80 MM HG: CPT | Mod: CPTII,,, | Performed by: NURSE PRACTITIONER

## 2024-07-10 PROCEDURE — 1160F RVW MEDS BY RX/DR IN RCRD: CPT | Mod: CPTII,,, | Performed by: NURSE PRACTITIONER

## 2024-07-10 PROCEDURE — 3288F FALL RISK ASSESSMENT DOCD: CPT | Mod: CPTII,,, | Performed by: NURSE PRACTITIONER

## 2024-07-10 RX ORDER — OXYBUTYNIN CHLORIDE 5 MG/1
5 TABLET ORAL 2 TIMES DAILY
COMMUNITY
Start: 2024-07-08

## 2024-07-10 RX ORDER — MECLIZINE HCL 12.5 MG 12.5 MG/1
12.5 TABLET ORAL 2 TIMES DAILY PRN
COMMUNITY

## 2024-07-10 RX ORDER — OMEPRAZOLE 20 MG/1
20 CAPSULE, DELAYED RELEASE ORAL DAILY
COMMUNITY

## 2024-07-10 RX ORDER — SERTRALINE HYDROCHLORIDE 100 MG/1
100 TABLET, FILM COATED ORAL DAILY
COMMUNITY

## 2024-07-10 RX ORDER — HYDRALAZINE HYDROCHLORIDE 100 MG/1
100 TABLET, FILM COATED ORAL EVERY 8 HOURS
COMMUNITY
Start: 2024-07-07 | End: 2025-07-07

## 2024-07-10 RX ORDER — LEVETIRACETAM 500 MG/1
1 TABLET ORAL 2 TIMES DAILY
COMMUNITY
Start: 2024-05-24 | End: 2025-05-24

## 2024-07-10 RX ORDER — AMLODIPINE BESYLATE 10 MG/1
10 TABLET ORAL DAILY
COMMUNITY
Start: 2024-07-08 | End: 2025-07-08

## 2024-07-10 NOTE — PROGRESS NOTES
PCP: Jennifer Zurita MD    Referring Provider:     Chief Complaint   Patient presents with    Hyperlipidemia    Hypertension    Hospital Follow Up     Patient denies any cardiac complaints today.         Subjective:   Дмитрий Collazo is a 79 y.o. female with hx of syncope, Mobitz II, AV block s/p ppm, HTN, DM, and HLD,  who presents for 3 month follow up. She is recovering from a craniotomy due to a subdural hematoma but states that she is doing well. She has not been released by neurosurgery as of yet. She has not had any cardiac issues to her or her daughter's knowledge.    4/4/2024 presents for HD follow up s/p elective dual chamber implantation by Dr. Luciano on 3/5/2024.   The patient present and states that she is doing well. She has had some discomfort at the ppm site. It is well healed and the incision is well approximated with out redness, edema or drainage. She forgot to take her meds this am and her bp is elevated. Please nurses notes.     1/22/24--(Dr. Luciano) Дмитрий Collazo is a 78 y.o. female with hx of HTN, HLD, and NIDDM who presents for abnormal heart rate (slow).       8/10/23--Dr. Gomez--Дмитрий Collazo is a 78 y.o. female who presents for evaluation of orthostatic syncope, hypertension.  She notes blood pressure was not well controlled, presented to the ER with headache and elevated blood pressure, was started on Toprol XL 25 mg q d,   She is monitoring her blood pressure at home, notes is much better controlled, headaches have resolved. She also notes symptoms have resolved with much more careful positional changes, counting to ten before she tries to ambulate and wearing compression stocking. She reports is able to perform normal activities of normal living without provocation of chest pain, pressure or shortness of breath.   She notes episodes of dizziness has resolved.     Fhx:  Family History   Problem Relation Name Age of Onset    Hypertension Mother      Stroke Mother      Hypertension  Father      Heart disease Sister      Diabetes Mellitus Sister      Diabetes Mellitus Sister       Shx:   Social History     Socioeconomic History    Marital status: Unknown   Tobacco Use    Smoking status: Never    Smokeless tobacco: Never   Substance and Sexual Activity    Alcohol use: Never    Drug use: Never    Sexual activity: Not Currently     Social Determinants of Health     Financial Resource Strain: Low Risk  (7/7/2024)    Received from [facility]    Overall Financial Resource Strain (CARDIA)     Difficulty of Paying Living Expenses: Not hard at all   Food Insecurity: No Food Insecurity (7/7/2024)    Received from [facility]    Hunger Vital Sign     Worried About Running Out of Food in the Last Year: Never true     Ran Out of Food in the Last Year: Never true   Transportation Needs: No Transportation Needs (7/7/2024)    Received from [facility]    PRAPARE - Transportation     Lack of Transportation (Medical): No     Lack of Transportation (Non-Medical): No   Physical Activity: Inactive (7/7/2024)    Received from [facility]    Exercise Vital Sign     Days of Exercise per Week: 0 days     Minutes of Exercise per Session: 0 min   Stress: No Stress Concern Present (7/7/2024)    Received from [facility]    [employer] - Occupational Stress Questionnaire     Feeling of Stress : Not at all   Housing Stability: Low Risk  (7/7/2024)    Received from [facility]    Housing Stability Vital Sign     Unable to Pay for Housing in the Last Year: No     Number of Times Moved in the Last Year: 1     Homeless in the Last Year: No       EKG     3/5/2024 Accelerated Junctional rhythm Nonspecific ST and T wave abnormality Abnormal ECG When compared with ECG of 22-JAAN-2024 14:35,  Junctional rhythm has replaced Sinus rhythm Nonspecific T wave abnormality, improved in Anterior-lateral leads Confirmed by Ricci Luciano MD ([id number]) on 3/5/2024 8:40:46  1/22/24--sinus bradycardia, ST and T wave abn, 54 bpm    ECHO Results for orders placed during the hospital encounter of 02/02/24    Echo    Interpretation Summary    Left Ventricle: The left ventricle is normal in size. There is mild concentric hypertrophy. There is normal systolic function with a visually estimated ejection fraction of 55 - 70%. Ejection fraction by visual approximation is 55%.    Right Ventricle: Normal right ventricular cavity size.    Aortic Valve: The aortic valve is a trileaflet valve.    Mitral Valve: There is mild bileaflet sclerosis. There is no stenosis. The mean pressure gradient across the mitral valve is 1 mmHg at a heart rate of  bpm. There is moderate regurgitation with an eccentric jet.    Tricuspid Valve: There is mild regurgitation.    Pulmonic Valve: There is mild regurgitation.    IVC/SVC: Elevated venous pressure at 15 mmHg.    Pericardium: There is a trivial effusion.    [facility] No results found for this or any previous visit.        Lab Results   Component Value Date     06/22/2024    K 3.5 06/22/2024     06/22/2024    CO2 20 (L) 06/22/2024    BUN 37 (H) 06/22/2024    CREATININE 1.45 (H) 06/22/2024    CALCIUM 9.3 06/22/2024    ANIONGAP 16 06/22/2024       Lab Results   Component Value Date    CHOL 196 02/02/2024     Lab Results   Component Value Date    HDL 48 02/02/2024     Lab Results   Component Value Date    LDLCALC 109 02/02/2024     Lab Results   Component Value Date    TRIG 197 (H) 02/02/2024     Lab Results   Component Value Date    CHOLHDL 4.1 02/02/2024       Lab Results   Component Value Date    WBC 8.45 06/22/2024    HGB 10.1 (L) 06/22/2024    HCT 30.6 (L) 06/22/2024    MCV 89.2 06/22/2024     06/22/2024           Current Outpatient Medications:     amLODIPine (NORVASC) 10 MG  "tablet, Take 10 mg by mouth once daily., Disp: , Rfl:     cloNIDine (CATAPRES) 0.1 MG tablet, Take 0.1 mg by mouth 3 (three) times daily., Disp: , Rfl:     hydrALAZINE (APRESOLINE) 100 MG tablet, Take 100 mg by mouth every 8 (eight) hours., Disp: , Rfl:     irbesartan-hydrochlorothiazide (AVALIDE) 300-12.5 mg per tablet, Take 1 tablet by mouth once daily., Disp: , Rfl:     levETIRAcetam (KEPPRA) 500 MG Tab, Take 1 tablet by mouth 2 (two) times daily., Disp: , Rfl:     levothyroxine (SYNTHROID) 100 MCG tablet, Take 100 mcg by mouth before breakfast., Disp: , Rfl:     meclizine (ANTIVERT) 12.5 mg tablet, Take 12.5 mg by mouth 2 (two) times daily as needed., Disp: , Rfl:     metFORMIN (GLUCOPHAGE) 1000 MG tablet, Take 500 mg by mouth 2 (two) times daily., Disp: , Rfl:     metoprolol succinate (TOPROL-XL) 25 MG 24 hr tablet, Take 1 tablet (25 mg total) by mouth once daily., Disp: 30 tablet, Rfl: 5    omeprazole (PRILOSEC) 20 MG capsule, Take 20 mg by mouth once daily., Disp: , Rfl:     oxybutynin (DITROPAN) 5 MG Tab, Take 5 mg by mouth 2 (two) times daily., Disp: , Rfl:     sertraline (ZOLOFT) 100 MG tablet, Take 100 mg by mouth once daily., Disp: , Rfl:     simvastatin (ZOCOR) 40 MG tablet, Take 40 mg by mouth every evening., Disp: , Rfl:     clopidogreL (PLAVIX) 75 mg tablet, Take by mouth. (Patient not taking: Reported on 7/10/2024), Disp: , Rfl:     sertraline (ZOLOFT) 50 MG tablet, , Disp: , Rfl:   No current facility-administered medications for this visit.  Meds reviewed and reconciled.      Review of Systems   Respiratory:  Negative for shortness of breath.    Cardiovascular:  Negative for chest pain, palpitations, orthopnea, claudication, leg swelling and PND.   Neurological:  Negative for dizziness, loss of consciousness and weakness.       Vitals:    07/10/24 1132   BP: 100/68   BP Location: Left arm   Patient Position: Sitting   Pulse: 68   SpO2: 96%   Weight: 65.3 kg (144 lb)   Height: 5' 1" (1.549 m)    " "    Objective:   /68 (BP Location: Left arm, Patient Position: Sitting)   Pulse 68   Ht 5' 1" (1.549 m)   Wt 65.3 kg (144 lb)   SpO2 96%   BMI 27.21 kg/m²     Physical Exam  Vitals and nursing note reviewed.   Constitutional:       Appearance: Normal appearance. She is normal weight.   HENT:      Head: Normocephalic and atraumatic.   Neck:      Vascular: No carotid bruit.   Cardiovascular:      Rate and Rhythm: Normal rate and regular rhythm.      Pulses: Normal pulses.      Heart sounds: Normal heart sounds.      Comments: Well healed ppm site to left upper chest wall  Pulmonary:      Effort: Pulmonary effort is normal.      Breath sounds: Normal breath sounds.   Abdominal:      Palpations: Abdomen is soft.   Musculoskeletal:      Cervical back: Neck supple.      Right lower leg: No edema.      Left lower leg: No edema.   Skin:     General: Skin is warm and dry.      Capillary Refill: Capillary refill takes less than 2 seconds.   Neurological:      Mental Status: She is alert. Mental status is at baseline.           Assessment:     1. AV block, Mobitz II        2. Essential hypertension        3. Hyperlipidemia, unspecified hyperlipidemia type              Plan:   AV block, Mobitz II  S/p dual chamber ppm 3/5/2024- Dr. Luciano    Essential hypertension  100/68 mmHg    Hyperlipidemia  Lab Results   Component Value Date    CHOL 196 02/02/2024     Lab Results   Component Value Date    HDL 48 02/02/2024     Lab Results   Component Value Date    LDLCALC 109 02/02/2024     Lab Results   Component Value Date    TRIG 197 (H) 02/02/2024       Lab Results   Component Value Date    CHOLHDL 4.1 02/02/2024     Zocor 40 mg po daily  Lipids followed by PCP      RTC: 6 months    "

## 2024-08-14 ENCOUNTER — HOSPITAL ENCOUNTER (EMERGENCY)
Facility: HOSPITAL | Age: 79
Discharge: HOME | End: 2024-08-14
Payer: MEDICARE

## 2024-08-14 VITALS
BODY MASS INDEX: 28.07 KG/M2 | HEART RATE: 62 BPM | RESPIRATION RATE: 18 BRPM | TEMPERATURE: 98 F | OXYGEN SATURATION: 97 % | WEIGHT: 143 LBS | DIASTOLIC BLOOD PRESSURE: 80 MMHG | SYSTOLIC BLOOD PRESSURE: 138 MMHG | HEIGHT: 60 IN

## 2024-08-14 DIAGNOSIS — I69.30 HISTORY OF HEMORRHAGIC CEREBROVASCULAR ACCIDENT (CVA) WITH RESIDUAL DEFICIT: ICD-10-CM

## 2024-08-14 DIAGNOSIS — I10 HYPERTENSION, UNSPECIFIED TYPE: Primary | ICD-10-CM

## 2024-08-14 DIAGNOSIS — R07.9 CHEST PAIN: ICD-10-CM

## 2024-08-14 LAB
ALBUMIN SERPL BCP-MCNC: 3.7 G/DL (ref 3.5–5)
ALBUMIN/GLOB SERPL: 0.9 {RATIO}
ALP SERPL-CCNC: 71 U/L (ref 55–142)
ALT SERPL W P-5'-P-CCNC: 14 U/L (ref 13–56)
ANION GAP SERPL CALCULATED.3IONS-SCNC: 10 MMOL/L (ref 7–16)
AST SERPL W P-5'-P-CCNC: 11 U/L (ref 15–37)
BASOPHILS # BLD AUTO: 0.06 K/UL (ref 0–0.2)
BASOPHILS NFR BLD AUTO: 0.8 % (ref 0–1)
BILIRUB SERPL-MCNC: 0.4 MG/DL (ref ?–1.2)
BUN SERPL-MCNC: 24 MG/DL (ref 7–18)
BUN/CREAT SERPL: 19 (ref 6–20)
CALCIUM SERPL-MCNC: 9.4 MG/DL (ref 8.5–10.1)
CHLORIDE SERPL-SCNC: 108 MMOL/L (ref 98–107)
CO2 SERPL-SCNC: 26 MMOL/L (ref 21–32)
CREAT SERPL-MCNC: 1.24 MG/DL (ref 0.55–1.02)
DIFFERENTIAL METHOD BLD: ABNORMAL
EGFR (NO RACE VARIABLE) (RUSH/TITUS): 44 ML/MIN/1.73M2
EOSINOPHIL # BLD AUTO: 0.28 K/UL (ref 0–0.5)
EOSINOPHIL NFR BLD AUTO: 3.5 % (ref 1–4)
ERYTHROCYTE [DISTWIDTH] IN BLOOD BY AUTOMATED COUNT: 14.4 % (ref 11.5–14.5)
GLOBULIN SER-MCNC: 4 G/DL (ref 2–4)
GLUCOSE SERPL-MCNC: 144 MG/DL (ref 74–106)
HCT VFR BLD AUTO: 35.4 % (ref 38–47)
HGB BLD-MCNC: 11.6 G/DL (ref 12–16)
IMM GRANULOCYTES # BLD AUTO: 0.03 K/UL (ref 0–0.04)
IMM GRANULOCYTES NFR BLD: 0.4 % (ref 0–0.4)
LYMPHOCYTES # BLD AUTO: 2.12 K/UL (ref 1–4.8)
LYMPHOCYTES NFR BLD AUTO: 26.7 % (ref 27–41)
MCH RBC QN AUTO: 28.9 PG (ref 27–31)
MCHC RBC AUTO-ENTMCNC: 32.8 G/DL (ref 32–36)
MCV RBC AUTO: 88.1 FL (ref 80–96)
MONOCYTES # BLD AUTO: 0.46 K/UL (ref 0–0.8)
MONOCYTES NFR BLD AUTO: 5.8 % (ref 2–6)
MPC BLD CALC-MCNC: 11.1 FL (ref 9.4–12.4)
NEUTROPHILS # BLD AUTO: 5 K/UL (ref 1.8–7.7)
NEUTROPHILS NFR BLD AUTO: 62.8 % (ref 53–65)
NRBC # BLD AUTO: 0 X10E3/UL
NRBC, AUTO (.00): 0 %
NT-PROBNP SERPL-MCNC: 1418 PG/ML (ref 1–450)
PLATELET # BLD AUTO: 240 K/UL (ref 150–400)
POTASSIUM SERPL-SCNC: 4.2 MMOL/L (ref 3.5–5.1)
PROT SERPL-MCNC: 7.7 G/DL (ref 6.4–8.2)
RBC # BLD AUTO: 4.02 M/UL (ref 4.2–5.4)
SODIUM SERPL-SCNC: 140 MMOL/L (ref 136–145)
TROPONIN I SERPL DL<=0.01 NG/ML-MCNC: 11.6 PG/ML
WBC # BLD AUTO: 7.95 K/UL (ref 4.5–11)

## 2024-08-14 PROCEDURE — 83880 ASSAY OF NATRIURETIC PEPTIDE: CPT | Performed by: NURSE PRACTITIONER

## 2024-08-14 PROCEDURE — 85025 COMPLETE CBC W/AUTO DIFF WBC: CPT | Performed by: NURSE PRACTITIONER

## 2024-08-14 PROCEDURE — 99285 EMERGENCY DEPT VISIT HI MDM: CPT | Mod: 25

## 2024-08-14 PROCEDURE — 93010 ELECTROCARDIOGRAM REPORT: CPT | Mod: ,,, | Performed by: INTERNAL MEDICINE

## 2024-08-14 PROCEDURE — 80053 COMPREHEN METABOLIC PANEL: CPT | Performed by: NURSE PRACTITIONER

## 2024-08-14 PROCEDURE — 93005 ELECTROCARDIOGRAM TRACING: CPT

## 2024-08-14 PROCEDURE — 94761 N-INVAS EAR/PLS OXIMETRY MLT: CPT

## 2024-08-14 PROCEDURE — 84484 ASSAY OF TROPONIN QUANT: CPT | Performed by: NURSE PRACTITIONER

## 2024-08-14 PROCEDURE — 36415 COLL VENOUS BLD VENIPUNCTURE: CPT | Performed by: NURSE PRACTITIONER

## 2024-08-14 NOTE — ED PROVIDER NOTES
Encounter Date: 8/14/2024       History     Chief Complaint   Patient presents with    Hypertension     States given patient 0.3 clonidine today    Arm Pain     Left arm pain       79 year old female presents to ED with complaint of elevated blood pressure and left arm pain. Family reports patient has been having issues with controlling blood pressure for the last 1 week and has not been responsive to current blood pressure medicine regimens. Patient was noted to have blood pressure of 230s/110s at home with associated left arm pain prompting family to bring for further evaluation. Family also reports patient has been complaining of intermittent chest pain and shortness of breath. Denies fever, chills, cough, nausea/vomiting. Reports weakness. Patient with history of hemorrhagic CVA     The history is provided by the patient and a relative.     Review of patient's allergies indicates:   Allergen Reactions    Celecoxib     Codeine     Penicillins      Past Medical History:   Diagnosis Date    CVA (cerebrovascular accident)     HTN (hypertension)     Mixed hyperlipidemia     Thyroiditis, unspecified     Type 2 diabetes mellitus with unspecified diabetic retinopathy without macular edema      Past Surgical History:   Procedure Laterality Date    A-V CARDIAC PACEMAKER INSERTION Left 03/05/2024    Procedure: INSERTION, CARDIAC PACEMAKER, DUAL CHAMBER;  Surgeon: Ricci Luciano MD;  Location: [facility] [place] CATH LAB;  Service: Cardiology;  Laterality: Left;    BRAIN SURGERY      HYSTERECTOMY      KNEE SURGERY      TUBAL LIGATION       Family History   Problem Relation Name Age of Onset    Hypertension Mother      Stroke Mother      Hypertension Father      Heart disease Sister      Diabetes Mellitus Sister      Diabetes Mellitus Sister       Social History     Tobacco Use    Smoking status: Never    Smokeless tobacco: Never   Substance Use Topics    Alcohol use: Never    Drug use: Never     Review of Systems    Constitutional:  Negative for chills and fever.   Eyes:  Negative for photophobia and visual disturbance.   Respiratory:  Positive for shortness of breath. Negative for cough.    Cardiovascular:  Positive for chest pain. Negative for palpitations.   Gastrointestinal:  Negative for nausea and vomiting.   Musculoskeletal:  Positive for arthralgias. Negative for joint swelling.   Skin:  Negative for color change and wound.   Neurological:  Positive for headaches. Negative for dizziness.   Hematological:  Negative for adenopathy. Does not bruise/bleed easily.   Psychiatric/Behavioral:  Negative for agitation.    All other systems reviewed and are negative.      Physical Exam     Initial Vitals [08/14/24 1736]   BP Pulse Resp Temp SpO2   125/84 63 17 97.8 °F (36.6 °C) 98 %      MAP       --         Physical Exam    Nursing note and vitals reviewed.  Constitutional: She appears well-developed and well-nourished.   HENT:   Head: Normocephalic and atraumatic.   Eyes: EOM are normal. Pupils are equal, round, and reactive to light.   Neck: Neck supple.   Normal range of motion.  Cardiovascular:  Normal rate and regular rhythm.           No murmur heard.  Pulmonary/Chest: She has no wheezes. She has no rhonchi.   Abdominal: Abdomen is soft. She exhibits no distension. There is no abdominal tenderness.   Musculoskeletal:         General: No tenderness or edema.      Cervical back: Normal range of motion and neck supple.     Lymphadenopathy:     She has no cervical adenopathy.   Neurological: She is alert and oriented to person, place, and time. No cranial nerve deficit or sensory deficit.   Skin: Skin is warm and dry. Capillary refill takes less than 2 seconds.   Psychiatric: She has a normal mood and affect. Thought content normal.         Medical Screening Exam   See Full Note    ED Course   Procedures  Labs Reviewed   COMPREHENSIVE METABOLIC PANEL - Abnormal       Result Value    Sodium 140      Potassium 4.2      Chloride  108 (*)     CO2 26      Anion Gap 10      Glucose 144 (*)     BUN 24 (*)     Creatinine 1.24 (*)     BUN/Creatinine Ratio 19      Calcium 9.4      Total Protein 7.7      Albumin 3.7      Globulin 4.0      A/G Ratio 0.9      Bilirubin, Total 0.4      Alk Phos 71      ALT 14      AST 11 (*)     eGFR 44 (*)    NT-PRO NATRIURETIC PEPTIDE - Abnormal    ProBNP 1,418 (*)    CBC WITH DIFFERENTIAL - Abnormal    WBC 7.95      RBC 4.02 (*)     Hemoglobin 11.6 (*)     Hematocrit 35.4 (*)     MCV 88.1      MCH 28.9      MCHC 32.8      RDW 14.4      Platelet Count 240      MPV 11.1      Neutrophils % 62.8      Lymphocytes % 26.7 (*)     Monocytes % 5.8      Eosinophils % 3.5      Basophils % 0.8      Immature Granulocytes % 0.4      nRBC, Auto 0.0      Neutrophils, Abs 5.00      Lymphocytes, Absolute 2.12      Monocytes, Absolute 0.46      Eosinophils, Absolute 0.28      Basophils, Absolute 0.06      Immature Granulocytes, Absolute 0.03      nRBC, Absolute 0.00      Diff Type Auto     TROPONIN I - Normal    Troponin I High Sensitivity 11.6     CBC W/ AUTO DIFFERENTIAL    Narrative:     The following orders were created for panel order CBC auto differential.  Procedure                               Abnormality         Status                     ---------                               -----------         ------                     CBC with Differential[[id number]]       Abnormal            Final result                 Please view results for these tests on the individual orders.          Imaging Results              X-Ray Chest AP Portable (Final result)  Result time 08/14/24 19:23:11      Final result by Bg Walker DO (08/14/24 19:23:11)                   Impression:      No acute cardiopulmonary process demonstrated.    Point of Service: [facility]      Electronically signed by: Bg Walker  Date:    08/14/2024  Time:    19:23               Narrative:    EXAMINATION:  XR CHEST AP PORTABLE    CLINICAL  HISTORY:  Chest Pain;    COMPARISON:  Chest x-ray June 22, 2024    TECHNIQUE:  Frontal view/views of the chest.    FINDINGS:  The cardiomediastinal silhouette is stable in configuration.  Cardiac conduction device again demonstrated.  Chronic change of the lungs without focal consolidation, pleural effusion, or pneumothorax.  Visualized osseous and surrounding soft tissue structures appear grossly unchanged.  Surgical clips project over the right upper quadrant of the abdomen.                                       CT Head Without Contrast (Final result)  Result time 08/14/24 18:04:28      Final result by Bg Walker DO (08/14/24 18:04:28)                   Impression:      No convincing imaging evidence of acute intracranial abnormality.  Prior right-sided craniotomy.  Probable chronic microvascular ischemic change and volume loss.    The CT exam was performed using one or more of the following dose    reduction techniques- Automated exposure control, adjustment of the mA    and/or kV according to patient size, and/or use of iterative    reconstructed technique.    Point of Service: [facility]      Electronically signed by: Bg Walker  Date:    08/14/2024  Time:    18:04               Narrative:    EXAMINATION:  CT HEAD WITHOUT CONTRAST    CLINICAL HISTORY:  Headache, new or worsening (Age >= 50y);    COMPARISON:  None    TECHNIQUE:  Multiple axial tomographic images of the brain were obtained without the use of intravenous contrast.    FINDINGS:  Midline structures are nondisplaced.  No convincing evidence of acute intracranial hemorrhage.  No convincing evidence of hydrocephalus.  Mild global volume loss demonstrated.  Moderate periventricular and subcortical hypoattenuation noted which is nonspecific but consistent with chronic microvascular ischemic change. Other considerations with similar appearance include demyelinating process and vasculitis. Prior right-sided craniotomy.  Visualized  paranasal sinuses and mastoid air cells are predominantly clear.                                       Medications - No data to display  Medical Decision Making  79 year old female presents to ED with complaint of elevated blood pressure and left arm pain. Family reports patient has been having issues with controlling blood pressure for the last 1 week and has not been responsive to current blood pressure medicine regimens. Patient was noted to have blood pressure of 230s/110s at home with associated left arm pain prompting family to bring for further evaluation. Family also reports patient has been complaining of intermittent chest pain and shortness of breath. Denies fever, chills, cough, nausea/vomiting. Reports weakness. Patient with history of hemorrhagic CVA     Labs, diagnostics obtained and reviewed. BP stable during ED course. F/u with PCP, Cardiology discussed. Increase Clonidine to 0.2mg TID until follow up    Amount and/or Complexity of Data Reviewed  Labs: ordered. Decision-making details documented in ED Course.  Radiology: ordered and independent interpretation performed.     Details: Negative CT   No acute CP processes  ECG/medicine tests: ordered. Decision-making details documented in ED Course.     Details: Junctional rhythm  Rate 64  No NSTEMI  Interpreted by Dr. Harley                                      Clinical Impression:   Final diagnoses:  [R07.9] Chest pain  [I10] Hypertension, unspecified type (Primary)  [I69.30] History of hemorrhagic cerebrovascular accident (CVA) with residual deficit        ED Disposition Condition    Discharge Stable          ED Prescriptions    None       Follow-up Information    None          Deanna Will, [facility]  08/14/24 [id number]

## 2024-08-16 LAB
OHS QRS DURATION: 82 MS
OHS QTC CALCULATION: 418 MS

## 2024-09-12 NOTE — ASSESSMENT & PLAN NOTE
Lab Results   Component Value Date    CHOL 196 02/02/2024     Lab Results   Component Value Date    HDL 48 02/02/2024     Lab Results   Component Value Date    LDLCALC 109 02/02/2024     Lab Results   Component Value Date    TRIG 197 (H) 02/02/2024       Lab Results   Component Value Date    CHOLHDL 4.1 02/02/2024     Zocor 40 mg po daily  Lipids followed by PCP   [FreeTextEntry1] : Physical exam limited due to telehealth visit.

## 2025-01-13 ENCOUNTER — HOSPITAL ENCOUNTER (OUTPATIENT)
Dept: CARDIOLOGY | Facility: HOSPITAL | Age: 80
Discharge: HOME | End: 2025-01-13
Attending: INTERNAL MEDICINE
Payer: MEDICARE

## 2025-01-13 ENCOUNTER — OFFICE VISIT (OUTPATIENT)
Dept: CARDIOLOGY | Facility: CLINIC | Age: 80
End: 2025-01-13
Payer: MEDICARE

## 2025-01-13 VITALS
SYSTOLIC BLOOD PRESSURE: 108 MMHG | OXYGEN SATURATION: 97 % | HEIGHT: 61 IN | HEART RATE: 69 BPM | DIASTOLIC BLOOD PRESSURE: 70 MMHG | BODY MASS INDEX: 27.02 KG/M2

## 2025-01-13 DIAGNOSIS — Z95.0 CARDIAC PACEMAKER IN SITU: ICD-10-CM

## 2025-01-13 DIAGNOSIS — I44.1 AV BLOCK, MOBITZ II: Chronic | ICD-10-CM

## 2025-01-13 DIAGNOSIS — I10 HYPERTENSION, UNSPECIFIED TYPE: Primary | ICD-10-CM

## 2025-01-13 DIAGNOSIS — Z95.0 PRESENCE OF CARDIAC PACEMAKER: Primary | ICD-10-CM

## 2025-01-13 DIAGNOSIS — E78.5 HYPERLIPIDEMIA, UNSPECIFIED HYPERLIPIDEMIA TYPE: ICD-10-CM

## 2025-01-13 DIAGNOSIS — Z95.0 PRESENCE OF CARDIAC PACEMAKER: ICD-10-CM

## 2025-01-13 DIAGNOSIS — I10 ESSENTIAL HYPERTENSION: ICD-10-CM

## 2025-01-13 PROCEDURE — 93280 PM DEVICE PROGR EVAL DUAL: CPT

## 2025-01-13 PROCEDURE — 93005 ELECTROCARDIOGRAM TRACING: CPT | Mod: PBBFAC | Performed by: HOSPITALIST

## 2025-01-13 PROCEDURE — 93010 ELECTROCARDIOGRAM REPORT: CPT | Mod: S$PBB,XE,, | Performed by: HOSPITALIST

## 2025-01-13 PROCEDURE — 99999 PR PBB SHADOW E&M-EST. PATIENT-LVL IV: CPT | Mod: PBBFAC,,, | Performed by: NURSE PRACTITIONER

## 2025-01-13 PROCEDURE — 99214 OFFICE O/P EST MOD 30 MIN: CPT | Mod: S$PBB,,, | Performed by: NURSE PRACTITIONER

## 2025-01-13 PROCEDURE — 99214 OFFICE O/P EST MOD 30 MIN: CPT | Mod: PBBFAC,25 | Performed by: NURSE PRACTITIONER

## 2025-01-13 PROCEDURE — 93280 PM DEVICE PROGR EVAL DUAL: CPT | Mod: 26,,, | Performed by: HOSPITALIST

## 2025-01-13 NOTE — ASSESSMENT & PLAN NOTE
Lab Results   Component Value Date    CHOL 196 02/02/2024     Lab Results   Component Value Date    HDL 48 02/02/2024     Lab Results   Component Value Date    LDLCALC 109 02/02/2024     Lab Results   Component Value Date    TRIG 197 (H) 02/02/2024       Lab Results   Component Value Date    CHOLHDL 4.1 02/02/2024     Zocor 40 mg po daily  Lipids followed by PCP

## 2025-01-13 NOTE — PROGRESS NOTES
PCP: Jennifer Zurita MD    Referring Provider:     Chief Complaint   Patient presents with    Hypertension    Chest Pain     Last week, it woke her up from sleep.    Edema     Goes down at night.    Shortness of Breath     With exertion         Subjective:   Дмитрий Collazo is a 79 y.o. female with hx of syncope, Mobitz II, AV block s/p ppm, HTN, DM, and HLD,  who presents for routine follow up. She reports that she had a fall in December 2024 resulting in a SDHa dn craniotomy. She now has someone with her at all times and has only fallen once in the last month. She has had one episode of cherst discomfort but none prior to or since.     7/10/2024 resents for 3 month follow up. She is recovering from a craniotomy due to a subdural hematoma but states that she is doing well. She has not been released by neurosurgery as of yet. She has not had any cardiac issues to her or her daughter's knowledge.    4/4/2024 presents for HD follow up s/p elective dual chamber implantation by Dr. Luciano on 3/5/2024.   The patient present and states that she is doing well. She has had some discomfort at the ppm site. It is well healed and the incision is well approximated with out redness, edema or drainage. She forgot to take her meds this am and her bp is elevated. Please nurses notes.     1/22/24--(Dr. Luciano) Дмитрий Collazo is a 78 y.o. female with hx of HTN, HLD, and NIDDM who presents for abnormal heart rate (slow).       8/10/23--Dr. Gomez--Дмитрий Collazo is a 78 y.o. female who presents for evaluation of orthostatic syncope, hypertension.  She notes blood pressure was not well controlled, presented to the ER with headache and elevated blood pressure, was started on Toprol XL 25 mg q d,   She is monitoring her blood pressure at home, notes is much better controlled, headaches have resolved. She also notes symptoms have resolved with much more careful positional changes, counting to ten before she tries to ambulate and wearing  compression stocking. She reports is able to perform normal activities of normal living without provocation of chest pain, pressure or shortness of breath.   She notes episodes of dizziness has resolved.     Fhx:  Family History   Problem Relation Name Age of Onset    Hypertension Mother      Stroke Mother      Hypertension Father      Heart disease Sister      Diabetes Mellitus Sister      Diabetes Mellitus Sister       Shx:   Social History     Socioeconomic History    Marital status: Unknown   Tobacco Use    Smoking status: Never    Smokeless tobacco: Never   Substance and Sexual Activity    Alcohol use: Never    Drug use: Never    Sexual activity: Not Currently     Social Drivers of Health     Financial Resource Strain: Low Risk  (7/7/2024)    Received from [facility]    Overall Financial Resource Strain (CARDIA)     Difficulty of Paying Living Expenses: Not hard at all   Food Insecurity: No Food Insecurity (7/7/2024)    Received from [facility]    Hunger Vital Sign     Worried About Running Out of Food in the Last Year: Never true     Ran Out of Food in the Last Year: Never true   Transportation Needs: No Transportation Needs (7/7/2024)    Received from [facility]    PRAPARE - Transportation     Lack of Transportation (Medical): No     Lack of Transportation (Non-Medical): No   Physical Activity: Inactive (7/7/2024)    Received from [facility]    Exercise Vital Sign     Days of Exercise per Week: 0 days     Minutes of Exercise per Session: 0 min   Stress: No Stress Concern Present (7/7/2024)    Received from [facility]    [employer] - Occupational Stress Questionnaire     Feeling of Stress : Not at all   Housing Stability: Low Risk  (7/7/2024)    Received from [facility]  Hospital    Housing Stability Vital Sign     Unable to Pay for Housing in the Last Year: No     Number of Times Moved in the Last Year: 1     Homeless in the Last Year: No       EKG   1/13/2025 a paced v sensed rhythm with prolonged AV conduction; HR 62 bpm  3/5/2024 Accelerated Junctional rhythm Nonspecific ST and T wave abnormality Abnormal ECG When compared with ECG of 22-JAAN-2024 14:35, Junctional rhythm has replaced Sinus rhythm Nonspecific T wave abnormality, improved in Anterior-lateral leads Confirmed by Ricci Luciano MD ([id number]) on 3/5/2024 8:40:46  1/22/24--sinus bradycardia, ST and T wave abn, 54 bpm    ECHO Results for orders placed during the hospital encounter of 02/02/24    Echo    Interpretation Summary    Left Ventricle: The left ventricle is normal in size. There is mild concentric hypertrophy. There is normal systolic function with a visually estimated ejection fraction of 55 - 70%. Ejection fraction by visual approximation is 55%.    Right Ventricle: Normal right ventricular cavity size.    Aortic Valve: The aortic valve is a trileaflet valve.    Mitral Valve: There is mild bileaflet sclerosis. There is no stenosis. The mean pressure gradient across the mitral valve is 1 mmHg at a heart rate of  bpm. There is moderate regurgitation with an eccentric jet.    Tricuspid Valve: There is mild regurgitation.    Pulmonic Valve: There is mild regurgitation.    IVC/SVC: Elevated venous pressure at 15 mmHg.    Pericardium: There is a trivial effusion.    [facility] No results found for this or any previous visit.        Lab Results   Component Value Date     08/14/2024    K 4.2 08/14/2024     (H) 08/14/2024    CO2 26 08/14/2024    BUN 24 (H) 08/14/2024    CREATININE 1.24 (H) 08/14/2024    CALCIUM 9.4 08/14/2024    ANIONGAP 10 08/14/2024       Lab Results   Component Value Date    CHOL 196 02/02/2024     Lab Results   Component Value Date    HDL 48 02/02/2024     Lab Results   Component Value Date     LDLCALC 109 02/02/2024     Lab Results   Component Value Date    TRIG 197 (H) 02/02/2024     Lab Results   Component Value Date    CHOLHDL 4.1 02/02/2024       Lab Results   Component Value Date    WBC 7.95 08/14/2024    HGB 11.6 (L) 08/14/2024    HCT 35.4 (L) 08/14/2024    MCV 88.1 08/14/2024     08/14/2024           Current Outpatient Medications:     amLODIPine (NORVASC) 10 MG tablet, Take 10 mg by mouth once daily., Disp: , Rfl:     cloNIDine (CATAPRES) 0.1 MG tablet, Take 0.1 mg by mouth 3 (three) times daily., Disp: , Rfl:     clopidogreL (PLAVIX) 75 mg tablet, Take by mouth. (Patient not taking: Reported on 7/10/2024), Disp: , Rfl:     hydrALAZINE (APRESOLINE) 100 MG tablet, Take 100 mg by mouth every 8 (eight) hours., Disp: , Rfl:     irbesartan-hydrochlorothiazide (AVALIDE) 300-12.5 mg per tablet, Take 1 tablet by mouth once daily., Disp: , Rfl:     levETIRAcetam (KEPPRA) 500 MG Tab, Take 1 tablet by mouth 2 (two) times daily., Disp: , Rfl:     levothyroxine (SYNTHROID) 100 MCG tablet, Take 100 mcg by mouth before breakfast., Disp: , Rfl:     meclizine (ANTIVERT) 12.5 mg tablet, Take 12.5 mg by mouth 2 (two) times daily as needed., Disp: , Rfl:     metFORMIN (GLUCOPHAGE) 1000 MG tablet, Take 500 mg by mouth 2 (two) times daily., Disp: , Rfl:     metoprolol succinate (TOPROL-XL) 25 MG 24 hr tablet, Take 1 tablet (25 mg total) by mouth once daily., Disp: 30 tablet, Rfl: 5    omeprazole (PRILOSEC) 20 MG capsule, Take 20 mg by mouth once daily., Disp: , Rfl:     oxybutynin (DITROPAN) 5 MG Tab, Take 5 mg by mouth 2 (two) times daily., Disp: , Rfl:     sertraline (ZOLOFT) 100 MG tablet, Take 100 mg by mouth once daily., Disp: , Rfl:     sertraline (ZOLOFT) 50 MG tablet, , Disp: , Rfl:     simvastatin (ZOCOR) 40 MG tablet, Take 40 mg by mouth every evening., Disp: , Rfl:   Meds reviewed but not reconciled. She did not bring her meds.      Review of Systems   Respiratory:  Negative for shortness of  "breath.    Cardiovascular:  Negative for chest pain, palpitations, orthopnea, claudication, leg swelling and PND.   Neurological:  Negative for dizziness, loss of consciousness and weakness.       Vitals:    01/13/25 1339   BP: 108/70   BP Location: Right arm   Patient Position: Sitting   Pulse: 69   SpO2: 97%   Height: 5' 1" (1.549 m)        Objective:   /70 (BP Location: Right arm, Patient Position: Sitting)   Pulse 69   Ht 5' 1" (1.549 m)   SpO2 97%   BMI 27.02 kg/m²     Physical Exam  Vitals and nursing note reviewed.   Constitutional:       Appearance: Normal appearance. She is normal weight.   HENT:      Head: Normocephalic and atraumatic.   Neck:      Vascular: No carotid bruit.   Cardiovascular:      Rate and Rhythm: Normal rate and regular rhythm.      Pulses: Normal pulses.      Heart sounds: Normal heart sounds.      Comments: Well healed ppm site to left upper chest wall  Pulmonary:      Effort: Pulmonary effort is normal.      Breath sounds: Normal breath sounds.   Abdominal:      Palpations: Abdomen is soft.   Musculoskeletal:      Cervical back: Neck supple.      Right lower leg: No edema.      Left lower leg: No edema.   Skin:     General: Skin is warm and dry.      Capillary Refill: Capillary refill takes less than 2 seconds.   Neurological:      Mental Status: She is alert. Mental status is at baseline.           Assessment:     1. Hypertension, unspecified type  EKG 12-lead      2. AV block, Mobitz II        3. Essential hypertension        4. Hyperlipidemia, unspecified hyperlipidemia type        5. Cardiac pacemaker in situ              Plan:   AV block, Mobitz II  S/p dual chamber ppm 3/5/2024- Dr. Luciano    Essential hypertension  108/70 mmHg    Hyperlipidemia  Lab Results   Component Value Date    CHOL 196 02/02/2024     Lab Results   Component Value Date    HDL 48 02/02/2024     Lab Results   Component Value Date    LDLCALC 109 02/02/2024     Lab Results   Component Value Date "    TRIG 197 (H) 02/02/2024       Lab Results   Component Value Date    CHOLHDL 4.1 02/02/2024     Zocor 40 mg po daily  Lipids followed by PCP    Cardiac pacemaker in situ  Continue ppm interrogations as scheduled      RTC: 6 months

## 2025-01-16 LAB
BATTERY VOLTAGE (V): 3.12 V
ERI (V): 2.63 V
OHS CV DC PP MS1: 0.4 MS
OHS CV DC PP MS2: 0.4 MS
OHS CV DC PP V1: 1.5 V
OHS CV DC PP V2: 2 V

## 2025-01-29 LAB
OHS QRS DURATION: 78 MS
OHS QTC CALCULATION: 420 MS

## 2025-02-05 ENCOUNTER — HOSPITAL ENCOUNTER (INPATIENT)
Facility: HOSPITAL | Age: 80
LOS: 5 days | Discharge: HOME HEALTH | DRG: 689 | End: 2025-02-10
Attending: STUDENT IN AN ORGANIZED HEALTH CARE EDUCATION/TRAINING PROGRAM | Admitting: STUDENT IN AN ORGANIZED HEALTH CARE EDUCATION/TRAINING PROGRAM
Payer: MEDICARE

## 2025-02-05 DIAGNOSIS — R31.9 URINARY TRACT INFECTION WITH HEMATURIA, SITE UNSPECIFIED: ICD-10-CM

## 2025-02-05 DIAGNOSIS — R07.9 CHEST PAIN: ICD-10-CM

## 2025-02-05 DIAGNOSIS — N30.80 EMPHYSEMATOUS CYSTITIS: Primary | ICD-10-CM

## 2025-02-05 DIAGNOSIS — N39.0 URINARY TRACT INFECTION WITH HEMATURIA, SITE UNSPECIFIED: ICD-10-CM

## 2025-02-05 PROBLEM — D62 ACUTE BLOOD LOSS ANEMIA: Status: ACTIVE | Noted: 2025-02-05

## 2025-02-05 PROBLEM — R11.2 NAUSEA & VOMITING: Status: ACTIVE | Noted: 2025-02-05

## 2025-02-05 PROBLEM — E11.65 HYPERGLYCEMIA DUE TO DIABETES MELLITUS: Status: ACTIVE | Noted: 2025-02-05

## 2025-02-05 PROBLEM — R53.83 FATIGUE: Status: ACTIVE | Noted: 2025-02-05

## 2025-02-05 PROBLEM — N13.39 OTHER HYDRONEPHROSIS: Status: ACTIVE | Noted: 2025-02-05

## 2025-02-05 PROBLEM — N17.9 AKI (ACUTE KIDNEY INJURY): Status: ACTIVE | Noted: 2025-02-05

## 2025-02-05 PROBLEM — R33.9 URINARY RETENTION: Status: ACTIVE | Noted: 2025-02-05

## 2025-02-05 PROBLEM — R56.9 SEIZURES: Status: ACTIVE | Noted: 2025-02-05

## 2025-02-05 PROBLEM — D64.9 ANEMIA: Status: ACTIVE | Noted: 2025-02-05

## 2025-02-05 LAB
ALBUMIN SERPL BCP-MCNC: 3.3 G/DL (ref 3.4–4.8)
ALBUMIN/GLOB SERPL: 1 {RATIO}
ALP SERPL-CCNC: 71 U/L (ref 40–150)
ALT SERPL W P-5'-P-CCNC: 9 U/L
ANION GAP SERPL CALCULATED.3IONS-SCNC: 10 MMOL/L (ref 7–16)
AST SERPL W P-5'-P-CCNC: 25 U/L (ref 5–34)
BACTERIA #/AREA URNS HPF: ABNORMAL /HPF
BASOPHILS # BLD AUTO: 0.05 K/UL (ref 0–0.2)
BASOPHILS NFR BLD AUTO: 0.5 % (ref 0–1)
BILIRUB SERPL-MCNC: 0.7 MG/DL
BILIRUB UR QL STRIP: NEGATIVE
BUN SERPL-MCNC: 18 MG/DL (ref 10–20)
BUN/CREAT SERPL: 11 (ref 6–20)
CALCIUM SERPL-MCNC: 8.9 MG/DL (ref 8.4–10.2)
CHLORIDE SERPL-SCNC: 109 MMOL/L (ref 98–107)
CLARITY UR: ABNORMAL
CO2 SERPL-SCNC: 22 MMOL/L (ref 23–31)
COLOR UR: ABNORMAL
CREAT SERPL-MCNC: 1.62 MG/DL (ref 0.55–1.02)
DIFFERENTIAL METHOD BLD: ABNORMAL
EGFR (NO RACE VARIABLE) (RUSH/TITUS): 32 ML/MIN/1.73M2
EOSINOPHIL # BLD AUTO: 0.24 K/UL (ref 0–0.5)
EOSINOPHIL NFR BLD AUTO: 2.5 % (ref 1–4)
ERYTHROCYTE [DISTWIDTH] IN BLOOD BY AUTOMATED COUNT: 13 % (ref 11.5–14.5)
EST. AVERAGE GLUCOSE BLD GHB EST-MCNC: 169 MG/DL
GLOBULIN SER-MCNC: 3.4 G/DL (ref 2–4)
GLUCOSE SERPL-MCNC: 139 MG/DL (ref 70–105)
GLUCOSE SERPL-MCNC: 167 MG/DL (ref 82–115)
GLUCOSE UR STRIP-MCNC: NORMAL MG/DL
HBA1C MFR BLD HPLC: 7.5 %
HCT VFR BLD AUTO: 32.1 % (ref 38–47)
HGB BLD-MCNC: 10.6 G/DL (ref 12–16)
IMM GRANULOCYTES # BLD AUTO: 0.04 K/UL (ref 0–0.04)
IMM GRANULOCYTES NFR BLD: 0.4 % (ref 0–0.4)
KETONES UR STRIP-SCNC: NEGATIVE MG/DL
LEUKOCYTE ESTERASE UR QL STRIP: ABNORMAL
LIPASE SERPL-CCNC: 5 U/L
LYMPHOCYTES # BLD AUTO: 1.75 K/UL (ref 1–4.8)
LYMPHOCYTES NFR BLD AUTO: 18.4 % (ref 27–41)
MCH RBC QN AUTO: 30.4 PG (ref 27–31)
MCHC RBC AUTO-ENTMCNC: 33 G/DL (ref 32–36)
MCV RBC AUTO: 92 FL (ref 80–96)
MONOCYTES # BLD AUTO: 0.52 K/UL (ref 0–0.8)
MONOCYTES NFR BLD AUTO: 5.5 % (ref 2–6)
MPC BLD CALC-MCNC: 10.4 FL (ref 9.4–12.4)
NEUTROPHILS # BLD AUTO: 6.9 K/UL (ref 1.8–7.7)
NEUTROPHILS NFR BLD AUTO: 72.7 % (ref 53–65)
NITRITE UR QL STRIP: NEGATIVE
NRBC # BLD AUTO: 0 X10E3/UL
NRBC, AUTO (.00): 0 %
PH UR STRIP: 6 PH UNITS
PLATELET # BLD AUTO: 196 K/UL (ref 150–400)
POTASSIUM SERPL-SCNC: 4.1 MMOL/L (ref 3.5–5.1)
PROT SERPL-MCNC: 6.7 G/DL (ref 5.8–7.6)
PROT UR QL STRIP: 200
RBC # BLD AUTO: 3.49 M/UL (ref 4.2–5.4)
RBC # UR STRIP: ABNORMAL /UL
RBC #/AREA URNS HPF: >182 /HPF
SODIUM SERPL-SCNC: 137 MMOL/L (ref 136–145)
SP GR UR STRIP: 1.02
TSH SERPL DL<=0.005 MIU/L-ACNC: 1.77 UIU/ML (ref 0.35–4.94)
UROBILINOGEN UR STRIP-ACNC: NORMAL MG/DL
WBC # BLD AUTO: 9.5 K/UL (ref 4.5–11)
WBC #/AREA URNS HPF: >182 /HPF

## 2025-02-05 PROCEDURE — 85025 COMPLETE CBC W/AUTO DIFF WBC: CPT | Performed by: NURSE PRACTITIONER

## 2025-02-05 PROCEDURE — 99900035 HC TECH TIME PER 15 MIN (STAT)

## 2025-02-05 PROCEDURE — 80053 COMPREHEN METABOLIC PANEL: CPT | Performed by: NURSE PRACTITIONER

## 2025-02-05 PROCEDURE — 99223 1ST HOSP IP/OBS HIGH 75: CPT | Mod: AI,,, | Performed by: STUDENT IN AN ORGANIZED HEALTH CARE EDUCATION/TRAINING PROGRAM

## 2025-02-05 PROCEDURE — 27000190 HC CPAP FULL FACE MASK W/VALVE

## 2025-02-05 PROCEDURE — 27000221 HC OXYGEN, UP TO 24 HOURS

## 2025-02-05 PROCEDURE — 51702 INSERT TEMP BLADDER CATH: CPT | Mod: ,,, | Performed by: UROLOGY

## 2025-02-05 PROCEDURE — 25000003 PHARM REV CODE 250: Performed by: STUDENT IN AN ORGANIZED HEALTH CARE EDUCATION/TRAINING PROGRAM

## 2025-02-05 PROCEDURE — 83036 HEMOGLOBIN GLYCOSYLATED A1C: CPT | Performed by: STUDENT IN AN ORGANIZED HEALTH CARE EDUCATION/TRAINING PROGRAM

## 2025-02-05 PROCEDURE — 83690 ASSAY OF LIPASE: CPT | Performed by: NURSE PRACTITIONER

## 2025-02-05 PROCEDURE — 96374 THER/PROPH/DIAG INJ IV PUSH: CPT

## 2025-02-05 PROCEDURE — 25000003 PHARM REV CODE 250: Performed by: NURSE PRACTITIONER

## 2025-02-05 PROCEDURE — 99900031 HC PATIENT EDUCATION (STAT)

## 2025-02-05 PROCEDURE — 94761 N-INVAS EAR/PLS OXIMETRY MLT: CPT

## 2025-02-05 PROCEDURE — 51702 INSERT TEMP BLADDER CATH: CPT

## 2025-02-05 PROCEDURE — 84443 ASSAY THYROID STIM HORMONE: CPT | Performed by: STUDENT IN AN ORGANIZED HEALTH CARE EDUCATION/TRAINING PROGRAM

## 2025-02-05 PROCEDURE — 81001 URINALYSIS AUTO W/SCOPE: CPT | Performed by: NURSE PRACTITIONER

## 2025-02-05 PROCEDURE — 11000001 HC ACUTE MED/SURG PRIVATE ROOM

## 2025-02-05 PROCEDURE — 63600175 PHARM REV CODE 636 W HCPCS: Performed by: NURSE PRACTITIONER

## 2025-02-05 PROCEDURE — 99223 1ST HOSP IP/OBS HIGH 75: CPT | Mod: 25,,, | Performed by: UROLOGY

## 2025-02-05 PROCEDURE — 94799 UNLISTED PULMONARY SVC/PX: CPT

## 2025-02-05 PROCEDURE — 5A09357 ASSISTANCE WITH RESPIRATORY VENTILATION, LESS THAN 24 CONSECUTIVE HOURS, CONTINUOUS POSITIVE AIRWAY PRESSURE: ICD-10-PCS

## 2025-02-05 PROCEDURE — 94660 CPAP INITIATION&MGMT: CPT

## 2025-02-05 PROCEDURE — 82962 GLUCOSE BLOOD TEST: CPT

## 2025-02-05 PROCEDURE — 87086 URINE CULTURE/COLONY COUNT: CPT | Performed by: NURSE PRACTITIONER

## 2025-02-05 PROCEDURE — 99285 EMERGENCY DEPT VISIT HI MDM: CPT | Mod: 25

## 2025-02-05 PROCEDURE — 36415 COLL VENOUS BLD VENIPUNCTURE: CPT | Performed by: NURSE PRACTITIONER

## 2025-02-05 RX ORDER — SODIUM CHLORIDE 0.9 % (FLUSH) 0.9 %
10 SYRINGE (ML) INJECTION EVERY 12 HOURS PRN
Status: DISCONTINUED | OUTPATIENT
Start: 2025-02-05 | End: 2025-02-10 | Stop reason: HOSPADM

## 2025-02-05 RX ORDER — NALOXONE HCL 0.4 MG/ML
0.02 VIAL (ML) INJECTION
Status: DISCONTINUED | OUTPATIENT
Start: 2025-02-05 | End: 2025-02-10 | Stop reason: HOSPADM

## 2025-02-05 RX ORDER — IBUPROFEN 200 MG
24 TABLET ORAL
Status: DISCONTINUED | OUTPATIENT
Start: 2025-02-05 | End: 2025-02-10 | Stop reason: HOSPADM

## 2025-02-05 RX ORDER — IRBESARTAN 150 MG/1
150 TABLET ORAL DAILY
COMMUNITY
Start: 2025-01-23

## 2025-02-05 RX ORDER — SPIRONOLACTONE 25 MG/1
25 TABLET ORAL DAILY
Status: DISCONTINUED | OUTPATIENT
Start: 2025-02-06 | End: 2025-02-10 | Stop reason: HOSPADM

## 2025-02-05 RX ORDER — LEVETIRACETAM 500 MG/1
500 TABLET ORAL 2 TIMES DAILY
Status: DISCONTINUED | OUTPATIENT
Start: 2025-02-05 | End: 2025-02-10 | Stop reason: HOSPADM

## 2025-02-05 RX ORDER — TRAZODONE HYDROCHLORIDE 50 MG/1
100 TABLET ORAL NIGHTLY PRN
Status: DISCONTINUED | OUTPATIENT
Start: 2025-02-05 | End: 2025-02-10 | Stop reason: HOSPADM

## 2025-02-05 RX ORDER — POTASSIUM CHLORIDE 1500 MG/1
20 TABLET, EXTENDED RELEASE ORAL 2 TIMES DAILY
COMMUNITY
Start: 2024-12-30

## 2025-02-05 RX ORDER — LOSARTAN POTASSIUM AND HYDROCHLOROTHIAZIDE 12.5; 5 MG/1; MG/1
1 TABLET ORAL DAILY
Status: DISCONTINUED | OUTPATIENT
Start: 2025-02-06 | End: 2025-02-05

## 2025-02-05 RX ORDER — LEVOTHYROXINE SODIUM 100 UG/1
100 TABLET ORAL
Status: DISCONTINUED | OUTPATIENT
Start: 2025-02-06 | End: 2025-02-10 | Stop reason: HOSPADM

## 2025-02-05 RX ORDER — OXYCODONE HYDROCHLORIDE 5 MG/1
5 TABLET ORAL EVERY 6 HOURS PRN
Status: DISCONTINUED | OUTPATIENT
Start: 2025-02-05 | End: 2025-02-10 | Stop reason: HOSPADM

## 2025-02-05 RX ORDER — SERTRALINE HYDROCHLORIDE 50 MG/1
100 TABLET, FILM COATED ORAL DAILY
Status: DISCONTINUED | OUTPATIENT
Start: 2025-02-06 | End: 2025-02-10 | Stop reason: HOSPADM

## 2025-02-05 RX ORDER — HYDROCODONE BITARTRATE AND ACETAMINOPHEN 5; 325 MG/1; MG/1
1 TABLET ORAL
Status: COMPLETED | OUTPATIENT
Start: 2025-02-05 | End: 2025-02-05

## 2025-02-05 RX ORDER — ATORVASTATIN CALCIUM 40 MG/1
40 TABLET, FILM COATED ORAL NIGHTLY
Status: DISCONTINUED | OUTPATIENT
Start: 2025-02-05 | End: 2025-02-10 | Stop reason: HOSPADM

## 2025-02-05 RX ORDER — CEFEPIME HYDROCHLORIDE 2 G/1
2 INJECTION, POWDER, FOR SOLUTION INTRAVENOUS
Status: COMPLETED | OUTPATIENT
Start: 2025-02-05 | End: 2025-02-05

## 2025-02-05 RX ORDER — METOPROLOL SUCCINATE 25 MG/1
25 TABLET, EXTENDED RELEASE ORAL DAILY
Status: DISCONTINUED | OUTPATIENT
Start: 2025-02-06 | End: 2025-02-10 | Stop reason: HOSPADM

## 2025-02-05 RX ORDER — ONDANSETRON HYDROCHLORIDE 2 MG/ML
4 INJECTION, SOLUTION INTRAVENOUS EVERY 8 HOURS PRN
Status: DISCONTINUED | OUTPATIENT
Start: 2025-02-05 | End: 2025-02-10 | Stop reason: HOSPADM

## 2025-02-05 RX ORDER — IBUPROFEN 200 MG
16 TABLET ORAL
Status: DISCONTINUED | OUTPATIENT
Start: 2025-02-05 | End: 2025-02-10 | Stop reason: HOSPADM

## 2025-02-05 RX ORDER — MORPHINE SULFATE 4 MG/ML
4 INJECTION, SOLUTION INTRAMUSCULAR; INTRAVENOUS EVERY 4 HOURS PRN
Status: DISCONTINUED | OUTPATIENT
Start: 2025-02-05 | End: 2025-02-10 | Stop reason: HOSPADM

## 2025-02-05 RX ORDER — POLYETHYLENE GLYCOL 3350 17 G/17G
17 POWDER, FOR SOLUTION ORAL 2 TIMES DAILY PRN
Status: DISCONTINUED | OUTPATIENT
Start: 2025-02-05 | End: 2025-02-10 | Stop reason: HOSPADM

## 2025-02-05 RX ORDER — CEFEPIME HYDROCHLORIDE 1 G/1
1 INJECTION, POWDER, FOR SOLUTION INTRAMUSCULAR; INTRAVENOUS
Status: DISCONTINUED | OUTPATIENT
Start: 2025-02-06 | End: 2025-02-07

## 2025-02-05 RX ORDER — OXYBUTYNIN CHLORIDE 5 MG/1
5 TABLET ORAL 2 TIMES DAILY
Status: DISCONTINUED | OUTPATIENT
Start: 2025-02-05 | End: 2025-02-10 | Stop reason: HOSPADM

## 2025-02-05 RX ORDER — INSULIN ASPART 100 [IU]/ML
0-5 INJECTION, SOLUTION INTRAVENOUS; SUBCUTANEOUS
Status: DISCONTINUED | OUTPATIENT
Start: 2025-02-05 | End: 2025-02-10 | Stop reason: HOSPADM

## 2025-02-05 RX ORDER — LOSARTAN POTASSIUM 50 MG/1
50 TABLET ORAL DAILY
Status: DISCONTINUED | OUTPATIENT
Start: 2025-02-06 | End: 2025-02-08

## 2025-02-05 RX ORDER — HYDROCHLOROTHIAZIDE 12.5 MG/1
12.5 TABLET ORAL DAILY
Status: DISCONTINUED | OUTPATIENT
Start: 2025-02-06 | End: 2025-02-08

## 2025-02-05 RX ORDER — PANTOPRAZOLE SODIUM 40 MG/1
40 TABLET, DELAYED RELEASE ORAL DAILY
Status: DISCONTINUED | OUTPATIENT
Start: 2025-02-06 | End: 2025-02-10 | Stop reason: HOSPADM

## 2025-02-05 RX ORDER — SODIUM CHLORIDE 9 MG/ML
INJECTION, SOLUTION INTRAVENOUS CONTINUOUS
Status: DISPENSED | OUTPATIENT
Start: 2025-02-05 | End: 2025-02-06

## 2025-02-05 RX ORDER — AMLODIPINE BESYLATE 10 MG/1
10 TABLET ORAL DAILY
Status: DISCONTINUED | OUTPATIENT
Start: 2025-02-06 | End: 2025-02-10 | Stop reason: HOSPADM

## 2025-02-05 RX ORDER — GLUCAGON 1 MG
1 KIT INJECTION
Status: DISCONTINUED | OUTPATIENT
Start: 2025-02-05 | End: 2025-02-10 | Stop reason: HOSPADM

## 2025-02-05 RX ADMIN — HYDROCODONE BITARTRATE AND ACETAMINOPHEN 1 TABLET: 5; 325 TABLET ORAL at 01:02

## 2025-02-05 RX ADMIN — LEVETIRACETAM 500 MG: 500 TABLET, FILM COATED ORAL at 09:02

## 2025-02-05 RX ADMIN — ATORVASTATIN CALCIUM 40 MG: 40 TABLET, FILM COATED ORAL at 09:02

## 2025-02-05 RX ADMIN — OXYBUTYNIN CHLORIDE 5 MG: 5 TABLET ORAL at 09:02

## 2025-02-05 RX ADMIN — CEFEPIME 2 G: 2 INJECTION, POWDER, FOR SOLUTION INTRAVENOUS at 04:02

## 2025-02-05 RX ADMIN — SODIUM CHLORIDE: 9 INJECTION, SOLUTION INTRAVENOUS at 08:02

## 2025-02-05 NOTE — ED PROVIDER NOTES
Encounter Date: 2/5/2025       History     Chief Complaint   Patient presents with    Flank Pain     Presents to er with complaint of flank pain, abdominal pain, dysuria, blood in urine. Was seen at [facility] and admitted on Monday. Has copies of records with     79 year old female presents to ED with complaint of abdominal pain and peeing blood. Patient states she was admitted to [facility] on Monday afternoon and was diagnosed with UTI and CT imaging noting she had an obstruction at her UVJ that appeared chronic. She reports continued pain and daughter states she was not being helped with the issue and asked that patient be discharged with records. Patient reports she has been sick over 1 month. Since discharge, she has continued to pee blood. Denies known fever, chills, chest pain, shortness of breath.     The history is provided by the patient.     Review of patient's allergies indicates:   Allergen Reactions    Celecoxib     Codeine     Penicillins      Past Medical History:   Diagnosis Date    CVA (cerebrovascular accident)     HTN (hypertension)     Mixed hyperlipidemia     Thyroiditis, unspecified     Type 2 diabetes mellitus with unspecified diabetic retinopathy without macular edema      Past Surgical History:   Procedure Laterality Date    A-V CARDIAC PACEMAKER INSERTION Left 03/05/2024    Procedure: INSERTION, CARDIAC PACEMAKER, DUAL CHAMBER;  Surgeon: Ricci Luciano MD;  [facility] C[facility] LAB;  Service: Cardiology;  Laterality: Left;    BRAIN SURGERY      HYSTERECTOMY      KNEE SURGERY      TUBAL LIGATION       Family History   Problem Relation Name Age of Onset    Hypertension Mother      Stroke Mother      Hypertension Father      Heart disease Sister      Diabetes Mellitus Sister      Diabetes Mellitus Sister       Social History     Tobacco Use    Smoking status: Never    Smokeless tobacco: Never   Substance Use Topics    Alcohol use: Never    Drug use: Never     Review of Systems    Constitutional:  Negative for chills and fever.   HENT:  Negative for sinus pressure and sinus pain.    Respiratory:  Negative for cough and shortness of breath.    Cardiovascular:  Negative for chest pain and palpitations.   Gastrointestinal:  Positive for abdominal pain and nausea. Negative for vomiting.   Genitourinary:  Positive for dysuria and hematuria.   Musculoskeletal:  Negative for arthralgias and gait problem.   Skin:  Negative for color change and wound.   Neurological:  Positive for weakness. Negative for dizziness.   Hematological:  Negative for adenopathy. Does not bruise/bleed easily.   Psychiatric/Behavioral:  Negative for agitation and confusion.    All other systems reviewed and are negative.      Physical Exam     Initial Vitals [02/05/25 1147]   BP Pulse Resp Temp SpO2   109/66 74 20 99.1 °F (37.3 °C) 97 %      MAP       --         Physical Exam    Nursing note and vitals reviewed.  Constitutional: She appears well-developed and well-nourished.   HENT:   Head: Normocephalic and atraumatic.   Eyes: EOM are normal. Pupils are equal, round, and reactive to light.   Neck: Neck supple.   Normal range of motion.  Cardiovascular:  Normal rate and regular rhythm.           No murmur heard.  Pulmonary/Chest: She has no wheezes. She has no rhonchi.   Abdominal: She exhibits no distension. There is abdominal tenderness.   Musculoskeletal:         General: No tenderness or edema.      Cervical back: Normal range of motion and neck supple.     Lymphadenopathy:     She has no cervical adenopathy.   Neurological: She is alert and oriented to person, place, and time. No cranial nerve deficit or sensory deficit.   Skin: Skin is warm and dry. Capillary refill takes less than 2 seconds.   Psychiatric: She has a normal mood and affect. Thought content normal.         Medical Screening Exam   See Full Note    ED Course   Procedures  Labs Reviewed   COMPREHENSIVE METABOLIC PANEL - Abnormal       Result Value     Sodium 137      Potassium 4.1      Chloride 109 (*)     CO2 22 (*)     Anion Gap 10      Glucose 167 (*)     BUN 18      Creatinine 1.62 (*)     BUN/Creatinine Ratio 11      Calcium 8.9      Total Protein 6.7      Albumin 3.3 (*)     Globulin 3.4      A/G Ratio 1.0      Bilirubin, Total 0.7      Alk Phos 71      ALT 9      AST 25      eGFR 32 (*)    URINALYSIS, REFLEX TO URINE CULTURE - Abnormal    Color, UA Brown (*)     Clarity, UA Extra Turbid      pH, UA 6.0      Leukocytes, UA Large (*)     Nitrites, UA Negative      Protein,  (*)     Glucose, UA Normal      Ketones, UA Negative      Urobilinogen, UA Normal      Bilirubin, UA Negative      Blood, UA Large (*)     Specific Gravity, UA 1.019     CBC WITH DIFFERENTIAL - Abnormal    WBC 9.50      RBC 3.49 (*)     Hemoglobin 10.6 (*)     Hematocrit 32.1 (*)     MCV 92.0      MCH 30.4      MCHC 33.0      RDW 13.0      Platelet Count 196      MPV 10.4      Neutrophils % 72.7 (*)     Lymphocytes % 18.4 (*)     Monocytes % 5.5      Eosinophils % 2.5      Basophils % 0.5      Immature Granulocytes % 0.4      nRBC, Auto 0.0      Neutrophils, Abs 6.90      Lymphocytes, Absolute 1.75      Monocytes, Absolute 0.52      Eosinophils, Absolute 0.24      Basophils, Absolute 0.05      Immature Granulocytes, Absolute 0.04      nRBC, Absolute 0.00      Diff Type Auto     URINALYSIS, MICROSCOPIC - Abnormal    WBC, UA >182 (*)     RBC, UA >182 (*)     Bacteria, UA Many (*)    LIPASE - Normal    Lipase 5     CULTURE, URINE   CBC W/ AUTO DIFFERENTIAL    Narrative:     The following orders were created for panel order CBC W/ AUTO DIFFERENTIAL.  Procedure                               Abnormality         Status                     ---------                               -----------         ------                     CBC with Differential[[id number]]       Abnormal            Final result                 Please view results for these tests on the individual orders.          Imaging  Results               CT Abdomen Pelvis  Without Contrast (Final result)  Result time 02/05/25 15:36:03      Final result by Akil Russo MD (02/05/25 15:36:03)                   Impression:      1. Urinary bladder wall thickening with associated air in the wall consistent with emphysematous cystitis.  This is similar to the prior study.  Urology/medicine follow-up recommended.  2. Moderate hydronephrosis of the right kidney may be associated with a recently passed stone or possible chronic UPJ narrowing.  Follow-up recommended.  3. Nonobstructing nephrolithiasis of the right kidney also.  4. See above comments also.  5. This report was flagged in Epic as abnormal.  Nurse practitioner Magen was notified of the findings at approximately 15:30.      Electronically signed by: Akil Russo  Date:    02/05/2025  Time:    15:36               Narrative:    EXAMINATION:  CT ABDOMEN PELVIS WITHOUT CONTRAST    CLINICAL HISTORY:  UTI, recurrent/complicated (Female);    TECHNIQUE:  Low dose axial images, sagittal and coronal reformations were obtained from the lung bases to the pubic symphysis, Oral contrast was not administered.  13:54 hours.  5 mm slice thickness.  The lack of thin-section imaging may diminish the sensitivity.    COMPARISON:  CT images only dated 02/03/2025 on the images with a 02/04/2025 header in [facility] from [facility]    FINDINGS:  The study has just become available for interpretation for myself.    Heart: Normal in size. No pericardial effusion.    Lung Bases: Well aerated, without consolidation or pleural fluid.    Liver: Liver is normal size.  Suspected small hepatic cyst on the left and right lobes.  Limited visualization on this noncontrast study.  Calcified granuloma in the right lobe near the dome of the liver.    Gallbladder: Status post cholecystectomy.    Bile Ducts: No evidence of dilated ducts.    Pancreas: No mass or peripancreatic fat stranding.    Spleen:  Unremarkable.    Adrenals: Unremarkable.    Kidneys/ Ureters: There is a nonobstructing stone at the lower pole of the right kidney.  Probable simple cyst near the upper pole of the right kidney.  Moderate hydronephrosis on the right and extrarenal pelvis configuration could be associated with a recently passed stone or possible chronic UPJ narrowing.  No hydroureter is present.    Left kidney and left ureter are within normal limits.    Bladder: There is urinary bladder wall thickening with associated air in the wall consistent with emphysematous cystitis.  This is similar to the prior study.  Follow-up recommended.    Reproductive organs: Status post hysterectomy.    GI Tract/Mesentery: No evidence of bowel obstruction or inflammation.  Mild diverticulosis of the left colon.  No evidence of acute diverticulitis.  Moderate retained feces in the colon and rectum.    Peritoneal Space: No ascites. No free air.    Retroperitoneum: No significant adenopathy.    Abdominal wall: Unremarkable.    Vasculature: No significant atherosclerosis or aneurysm.    Bones: No acute fracture.                                       Medications   HYDROcodone-acetaminophen 5-325 mg per tablet 1 tablet (1 tablet Oral Given 2/5/25 1327)   ceFEPIme injection 2 g (2 g Intravenous Given 2/5/25 1649)     Medical Decision Making  79 year old female presents to ED with complaint of abdominal pain and peeing blood. Patient states she was admitted to [facility] on Monday afternoon and was diagnosed with UTI and CT imaging noting she had an obstruction at her UVJ that appeared chronic. She reports continued pain and daughter states she was not being helped with the issue and asked that patient be discharged with records. Patient reports she has been sick over 1 month. Since discharge, she has continued to pee blood. Denies known fever, chills, chest pain, shortness of breath.     Labs, diagnostics ordered and reviewed  Radiologist interpretation  reviewed  Medical records reviewed    Case discussed with Dr. Villalpando    Amount and/or Complexity of Data Reviewed  Labs: ordered.  Radiology: ordered.     Details: Urinary bladder wall thickening with associated air in the wall consistent with emphysematous cystitis.  This is similar to the prior study.  Urology/medicine follow-up recommended.  2. Moderate hydronephrosis of the right kidney may be associated with a recently passed stone or possible chronic UPJ narrowing.  Follow-up recommended.  3. Nonobstructing nephrolithiasis of the right kidney also.  4. See above comments also.    Risk  Prescription drug management.                                      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

## 2025-02-05 NOTE — SUBJECTIVE & OBJECTIVE
Past Medical History:   Diagnosis Date    CVA (cerebrovascular accident)     HTN (hypertension)     Mixed hyperlipidemia     Thyroiditis, unspecified     Type 2 diabetes mellitus with unspecified diabetic retinopathy without macular edema        Past Surgical History:   Procedure Laterality Date    A-V CARDIAC PACEMAKER INSERTION Left 03/05/2024    Procedure: INSERTION, CARDIAC PACEMAKER, DUAL CHAMBER;  Surgeon: Ricci Luciano MD;  Location: [facility] CATH LAB;  Service: Cardiology;  Laterality: Left;    BRAIN SURGERY      HYSTERECTOMY      KNEE SURGERY      TUBAL LIGATION         Review of patient's allergies indicates:   Allergen Reactions    Celecoxib     Codeine     Penicillins        Family History       Problem Relation (Age of Onset)    Diabetes Mellitus Sister, Sister    Heart disease Sister    Hypertension Mother, Father    Stroke Mother            Tobacco Use    Smoking status: Never    Smokeless tobacco: Never   Substance and Sexual Activity    Alcohol use: Never    Drug use: Never    Sexual activity: Not Currently       Review of Systems   Constitutional:  Positive for activity change and fatigue.   HENT:  Negative for sore throat.    Respiratory:  Negative for cough.    Cardiovascular:  Negative for palpitations.   Gastrointestinal:  Positive for abdominal pain. Negative for vomiting.   Genitourinary:  Positive for difficulty urinating, flank pain and hematuria.   Musculoskeletal:  Positive for arthralgias and back pain.   Neurological:  Positive for weakness.   Psychiatric/Behavioral:  Negative for confusion.        Objective:     Temp:  [99.1 °F (37.3 °C)] 99.1 °F (37.3 °C)  Pulse:  [74] 74  Resp:  [20] 20  SpO2:  [97 %] 97 %  BP: (109)/(66) 109/66  Weight: 64.4 kg (142 lb)  Body mass index is 26.83 kg/m².           Drains       None                    Physical Exam  Vitals and nursing note reviewed. Exam conducted with a chaperone present.   Constitutional:       Appearance: She is  ill-appearing. She is not toxic-appearing or diaphoretic.   Eyes:      General: No scleral icterus.  Cardiovascular:      Rate and Rhythm: Normal rate.   Pulmonary:      Effort: Pulmonary effort is normal. No respiratory distress.   Abdominal:      Palpations: Abdomen is soft.      Tenderness: There is abdominal tenderness.   Genitourinary:     Comments: Using sterile technique an 18fr dove catheter had been placed with resultant gross hematuria.   Musculoskeletal:         General: No deformity.   Skin:     Findings: No rash.   Neurological:      General: No focal deficit present.      Mental Status: Mental status is at baseline.      Motor: Weakness present.   Psychiatric:         Behavior: Behavior normal.          Significant Labs:    BMP:  Recent Labs   Lab 02/05/25  1231      K 4.1   *   CO2 22*   BUN 18   CREATININE 1.62*   CALCIUM 8.9       CBC:  Recent Labs   Lab 02/05/25  1231   WBC 9.50   HGB 10.6*   HCT 32.1*          All pertinent labs results from the past 24 hours have been reviewed.    Significant Imaging:  All pertinent imaging results/findings from the past 24 hours have been reviewed.

## 2025-02-05 NOTE — CONSULTS
[facility] - Emergency Department  Urology  Consult Note    Patient Name: Дмитрий Collazo  MRN: [id number]  Admission Date: 2/5/2025  Hospital Length of Stay: 0   Code Status: Prior   Attending Provider: No att. providers found   Consulting Provider: SEBAS VILLALPANDO MD  Primary Care Physician: Jennifer Zurita MD  Principal Problem:Emphysematous cystitis    Inpatient consult to Urology  Consult performed by: Sebas Villalpando MD  Consult ordered by: Deanna Will FNP  Reason for consult: Emphysematous cystitis  Assessment/Recommendations: I personally reviewed the CT scan of the abdomen and pelvis which demonstrated emphysematous cystitis and recommended broad-spectrum cefepime and continue Hameed catheter decompression.  The patient will require a tight control of hyperglycemia as the hyperglycemia is likely contributing to the emphysematous cystitis.  There is right hydronephrosis seen on the CT scan and there is no hydroureter so I am not sure if the urinary retention is contributing or if she has development of acquired right UPJ obstruction.  I have ordered a Mag 3 Lasix renal scan to evaluate split function and T1 half curves.  Plan to continue antibiotics and Hameed catheter.  Urology will remain immediately available.  Please call with any questions          Subjective:     HPI:  The patient is an 79-year-old female that was recently released from an outside facility with a complicated urinary tract infection and presented to the ER with emphysematous cystitis.  The patient has a history of diabetes mellitus and a previous urinary tract infection that has failed to clear.  A comprehensive examination was performed demonstrating acute kidney injury with bladder distention and a noncontrast CT scan of the abdomen and pelvis was performed demonstrating emphysematous cystitis.  The patient is accompanied by her daughter and reports that they were released from the outside hospital to seek urology.  Upon  arrival in the emergency room Urology was consulted for emphysematous cystitis with bladder outlet obstruction and a Hameed catheter was placed upon arrival.  The patient is tolerating the catheter.  She is having gross hematuria.     Past Medical History:   Diagnosis Date    CVA (cerebrovascular accident)     HTN (hypertension)     Mixed hyperlipidemia     Thyroiditis, unspecified     Type 2 diabetes mellitus with unspecified diabetic retinopathy without macular edema        Past Surgical History:   Procedure Laterality Date    A-V CARDIAC PACEMAKER INSERTION Left 03/05/2024    Procedure: INSERTION, CARDIAC PACEMAKER, DUAL CHAMBER;  Surgeon: Ricci Luciano MD;  Location: [facility] CATH LAB;  Service: Cardiology;  Laterality: Left;    BRAIN SURGERY      HYSTERECTOMY      KNEE SURGERY      TUBAL LIGATION         Review of patient's allergies indicates:   Allergen Reactions    Celecoxib     Codeine     Penicillins        Family History       Problem Relation (Age of Onset)    Diabetes Mellitus Sister, Sister    Heart disease Sister    Hypertension Mother, Father    Stroke Mother            Tobacco Use    Smoking status: Never    Smokeless tobacco: Never   Substance and Sexual Activity    Alcohol use: Never    Drug use: Never    Sexual activity: Not Currently       Review of Systems   Constitutional:  Positive for activity change and fatigue.   HENT:  Negative for sore throat.    Respiratory:  Negative for cough.    Cardiovascular:  Negative for palpitations.   Gastrointestinal:  Positive for abdominal pain. Negative for vomiting.   Genitourinary:  Positive for difficulty urinating, flank pain and hematuria.   Musculoskeletal:  Positive for arthralgias and back pain.   Neurological:  Positive for weakness.   Psychiatric/Behavioral:  Negative for confusion.        Objective:     Temp:  [99.1 °F (37.3 °C)] 99.1 °F (37.3 °C)  Pulse:  [74] 74  Resp:  [20] 20  SpO2:  [97 %] 97 %  BP: (109)/(66) 109/66  Weight: 64.4 kg  (142 lb)  Body mass index is 26.83 kg/m².           Drains       None                    Physical Exam  Vitals and nursing note reviewed. Exam conducted with a chaperone present.   Constitutional:       Appearance: She is ill-appearing. She is not toxic-appearing or diaphoretic.   Eyes:      General: No scleral icterus.  Cardiovascular:      Rate and Rhythm: Normal rate.   Pulmonary:      Effort: Pulmonary effort is normal. No respiratory distress.   Abdominal:      Palpations: Abdomen is soft.      Tenderness: There is abdominal tenderness.   Genitourinary:     Comments: Using sterile technique an 18fr dove catheter had been placed with resultant gross hematuria.   Musculoskeletal:         General: No deformity.   Skin:     Findings: No rash.   Neurological:      General: No focal deficit present.      Mental Status: Mental status is at baseline.      Motor: Weakness present.   Psychiatric:         Behavior: Behavior normal.          Significant Labs:    BMP:  Recent Labs   Lab 02/05/25  1231      K 4.1   *   CO2 22*   BUN 18   CREATININE 1.62*   CALCIUM 8.9       CBC:  Recent Labs   Lab 02/05/25  1231   WBC 9.50   HGB 10.6*   HCT 32.1*          All pertinent labs results from the past 24 hours have been reviewed.    Significant Imaging:  All pertinent imaging results/findings from the past 24 hours have been reviewed.                    Assessment and Plan:     * Emphysematous cystitis       Other hydronephrosis       Hyperglycemia due to diabetes mellitus       Urinary retention       DAVID (acute kidney injury)           VTE Risk Mitigation (From admission, onward)      None            Thank you for your consult. I will follow-up with patient. Please contact us if you have any additional questions.    DARRYN MITTAL MD  Urology  [facility] - Emergency Department

## 2025-02-06 PROBLEM — N17.9 AKI (ACUTE KIDNEY INJURY): Status: ACTIVE | Noted: 2025-02-06

## 2025-02-06 LAB
ANION GAP SERPL CALCULATED.3IONS-SCNC: 15 MMOL/L (ref 7–16)
BASOPHILS # BLD AUTO: 0.03 K/UL (ref 0–0.2)
BASOPHILS NFR BLD AUTO: 0.4 % (ref 0–1)
BUN SERPL-MCNC: 12 MG/DL (ref 10–20)
BUN/CREAT SERPL: 11 (ref 6–20)
CALCIUM SERPL-MCNC: 9.1 MG/DL (ref 8.4–10.2)
CHLORIDE SERPL-SCNC: 110 MMOL/L (ref 98–107)
CO2 SERPL-SCNC: 19 MMOL/L (ref 23–31)
CREAT SERPL-MCNC: 1.08 MG/DL (ref 0.55–1.02)
DIFFERENTIAL METHOD BLD: ABNORMAL
EGFR (NO RACE VARIABLE) (RUSH/TITUS): 52 ML/MIN/1.73M2
EOSINOPHIL # BLD AUTO: 0.27 K/UL (ref 0–0.5)
EOSINOPHIL NFR BLD AUTO: 3.9 % (ref 1–4)
ERYTHROCYTE [DISTWIDTH] IN BLOOD BY AUTOMATED COUNT: 12.6 % (ref 11.5–14.5)
GLUCOSE SERPL-MCNC: 146 MG/DL (ref 70–105)
GLUCOSE SERPL-MCNC: 155 MG/DL (ref 70–105)
GLUCOSE SERPL-MCNC: 159 MG/DL (ref 82–115)
GLUCOSE SERPL-MCNC: 170 MG/DL (ref 70–105)
GLUCOSE SERPL-MCNC: 221 MG/DL (ref 70–105)
HCT VFR BLD AUTO: 30.3 % (ref 38–47)
HGB BLD-MCNC: 9.8 G/DL (ref 12–16)
IMM GRANULOCYTES # BLD AUTO: 0.02 K/UL (ref 0–0.04)
IMM GRANULOCYTES NFR BLD: 0.3 % (ref 0–0.4)
LYMPHOCYTES # BLD AUTO: 1.47 K/UL (ref 1–4.8)
LYMPHOCYTES NFR BLD AUTO: 21.2 % (ref 27–41)
MAGNESIUM SERPL-MCNC: 1.6 MG/DL (ref 1.6–2.6)
MCH RBC QN AUTO: 29.4 PG (ref 27–31)
MCHC RBC AUTO-ENTMCNC: 32.3 G/DL (ref 32–36)
MCV RBC AUTO: 91 FL (ref 80–96)
MONOCYTES # BLD AUTO: 0.34 K/UL (ref 0–0.8)
MONOCYTES NFR BLD AUTO: 4.9 % (ref 2–6)
MPC BLD CALC-MCNC: 10.2 FL (ref 9.4–12.4)
NEUTROPHILS # BLD AUTO: 4.79 K/UL (ref 1.8–7.7)
NEUTROPHILS NFR BLD AUTO: 69.3 % (ref 53–65)
NRBC # BLD AUTO: 0 X10E3/UL
NRBC, AUTO (.00): 0 %
PHOSPHATE SERPL-MCNC: 3.1 MG/DL (ref 2.3–4.7)
PLATELET # BLD AUTO: 181 K/UL (ref 150–400)
POTASSIUM SERPL-SCNC: 3.8 MMOL/L (ref 3.5–5.1)
RBC # BLD AUTO: 3.33 M/UL (ref 4.2–5.4)
SODIUM SERPL-SCNC: 140 MMOL/L (ref 136–145)
WBC # BLD AUTO: 6.92 K/UL (ref 4.5–11)

## 2025-02-06 PROCEDURE — 97161 PT EVAL LOW COMPLEX 20 MIN: CPT

## 2025-02-06 PROCEDURE — 25000003 PHARM REV CODE 250: Performed by: STUDENT IN AN ORGANIZED HEALTH CARE EDUCATION/TRAINING PROGRAM

## 2025-02-06 PROCEDURE — 82962 GLUCOSE BLOOD TEST: CPT

## 2025-02-06 PROCEDURE — 36415 COLL VENOUS BLD VENIPUNCTURE: CPT | Performed by: STUDENT IN AN ORGANIZED HEALTH CARE EDUCATION/TRAINING PROGRAM

## 2025-02-06 PROCEDURE — 99233 SBSQ HOSP IP/OBS HIGH 50: CPT | Mod: ,,, | Performed by: STUDENT IN AN ORGANIZED HEALTH CARE EDUCATION/TRAINING PROGRAM

## 2025-02-06 PROCEDURE — 27000221 HC OXYGEN, UP TO 24 HOURS

## 2025-02-06 PROCEDURE — 85025 COMPLETE CBC W/AUTO DIFF WBC: CPT | Performed by: STUDENT IN AN ORGANIZED HEALTH CARE EDUCATION/TRAINING PROGRAM

## 2025-02-06 PROCEDURE — 99900035 HC TECH TIME PER 15 MIN (STAT)

## 2025-02-06 PROCEDURE — 94660 CPAP INITIATION&MGMT: CPT

## 2025-02-06 PROCEDURE — 11000001 HC ACUTE MED/SURG PRIVATE ROOM

## 2025-02-06 PROCEDURE — 84100 ASSAY OF PHOSPHORUS: CPT | Performed by: STUDENT IN AN ORGANIZED HEALTH CARE EDUCATION/TRAINING PROGRAM

## 2025-02-06 PROCEDURE — 83735 ASSAY OF MAGNESIUM: CPT | Performed by: STUDENT IN AN ORGANIZED HEALTH CARE EDUCATION/TRAINING PROGRAM

## 2025-02-06 PROCEDURE — 94761 N-INVAS EAR/PLS OXIMETRY MLT: CPT

## 2025-02-06 PROCEDURE — 99900031 HC PATIENT EDUCATION (STAT)

## 2025-02-06 PROCEDURE — 97165 OT EVAL LOW COMPLEX 30 MIN: CPT

## 2025-02-06 PROCEDURE — A9562 TC99M MERTIATIDE: HCPCS | Performed by: STUDENT IN AN ORGANIZED HEALTH CARE EDUCATION/TRAINING PROGRAM

## 2025-02-06 PROCEDURE — 80048 BASIC METABOLIC PNL TOTAL CA: CPT | Performed by: STUDENT IN AN ORGANIZED HEALTH CARE EDUCATION/TRAINING PROGRAM

## 2025-02-06 PROCEDURE — 63600175 PHARM REV CODE 636 W HCPCS: Performed by: STUDENT IN AN ORGANIZED HEALTH CARE EDUCATION/TRAINING PROGRAM

## 2025-02-06 RX ORDER — SODIUM CHLORIDE 9 MG/ML
INJECTION, SOLUTION INTRAVENOUS CONTINUOUS
Status: ACTIVE | OUTPATIENT
Start: 2025-02-06 | End: 2025-02-06

## 2025-02-06 RX ORDER — FUROSEMIDE 10 MG/ML
40 INJECTION INTRAMUSCULAR; INTRAVENOUS ONCE
Status: COMPLETED | OUTPATIENT
Start: 2025-02-06 | End: 2025-02-06

## 2025-02-06 RX ORDER — BETIATIDE 1 MG/1
4.8 INJECTION, POWDER, LYOPHILIZED, FOR SOLUTION INTRAVENOUS
Status: COMPLETED | OUTPATIENT
Start: 2025-02-06 | End: 2025-02-06

## 2025-02-06 RX ADMIN — HYDROCHLOROTHIAZIDE 12.5 MG: 12.5 TABLET ORAL at 09:02

## 2025-02-06 RX ADMIN — CEFEPIME 1 G: 1 INJECTION, POWDER, FOR SOLUTION INTRAMUSCULAR; INTRAVENOUS at 05:02

## 2025-02-06 RX ADMIN — METOPROLOL SUCCINATE 25 MG: 25 TABLET, EXTENDED RELEASE ORAL at 09:02

## 2025-02-06 RX ADMIN — LOSARTAN POTASSIUM 50 MG: 50 TABLET, FILM COATED ORAL at 09:02

## 2025-02-06 RX ADMIN — TECHNESCAN TC 99M MERTIATIDE 4.8 MILLICURIE: 1 INJECTION, POWDER, LYOPHILIZED, FOR SOLUTION INTRAVENOUS at 01:02

## 2025-02-06 RX ADMIN — TRAZODONE HYDROCHLORIDE 100 MG: 50 TABLET ORAL at 09:02

## 2025-02-06 RX ADMIN — SODIUM CHLORIDE: 9 INJECTION, SOLUTION INTRAVENOUS at 09:02

## 2025-02-06 RX ADMIN — PANTOPRAZOLE SODIUM 40 MG: 40 TABLET, DELAYED RELEASE ORAL at 09:02

## 2025-02-06 RX ADMIN — SPIRONOLACTONE 25 MG: 25 TABLET ORAL at 09:02

## 2025-02-06 RX ADMIN — OXYBUTYNIN CHLORIDE 5 MG: 5 TABLET ORAL at 08:02

## 2025-02-06 RX ADMIN — CEFEPIME 1 G: 1 INJECTION, POWDER, FOR SOLUTION INTRAMUSCULAR; INTRAVENOUS at 09:02

## 2025-02-06 RX ADMIN — AMLODIPINE BESYLATE 10 MG: 10 TABLET ORAL at 09:02

## 2025-02-06 RX ADMIN — ONDANSETRON 4 MG: 2 INJECTION INTRAMUSCULAR; INTRAVENOUS at 10:02

## 2025-02-06 RX ADMIN — LEVETIRACETAM 500 MG: 500 TABLET, FILM COATED ORAL at 08:02

## 2025-02-06 RX ADMIN — OXYBUTYNIN CHLORIDE 5 MG: 5 TABLET ORAL at 09:02

## 2025-02-06 RX ADMIN — SERTRALINE HYDROCHLORIDE 100 MG: 50 TABLET ORAL at 09:02

## 2025-02-06 RX ADMIN — INSULIN ASPART 2 UNITS: 100 INJECTION, SOLUTION INTRAVENOUS; SUBCUTANEOUS at 05:02

## 2025-02-06 RX ADMIN — LEVETIRACETAM 500 MG: 500 TABLET, FILM COATED ORAL at 09:02

## 2025-02-06 RX ADMIN — MORPHINE SULFATE 4 MG: 4 INJECTION, SOLUTION INTRAMUSCULAR; INTRAVENOUS at 10:02

## 2025-02-06 RX ADMIN — CEFEPIME 1 G: 1 INJECTION, POWDER, FOR SOLUTION INTRAMUSCULAR; INTRAVENOUS at 02:02

## 2025-02-06 RX ADMIN — FUROSEMIDE 40 MG: 10 INJECTION, SOLUTION INTRAMUSCULAR; INTRAVENOUS at 01:02

## 2025-02-06 RX ADMIN — ATORVASTATIN CALCIUM 40 MG: 40 TABLET, FILM COATED ORAL at 08:02

## 2025-02-06 NOTE — PLAN OF CARE
[facility] - Emergency Department  Initial Discharge Assessment       Primary Care Provider: Jennifer Zurita MD    Admission Diagnosis: Emphysematous cystitis [N30.80]    Admission Date: 2/5/2025  Expected Discharge Date: 2/8/2025    Transition of Care Barriers: None    Payor: MEDICARE / Plan: MEDICARE PART A & B / Product Type: Government /     Extended Emergency Contact Information  Primary Emergency Contact: MISHA GUERRA  Mobile Phone: [phone]  Relation: Son  Preferred language: English   needed? No  Secondary Emergency Contact: Caitlin Jenkins  Mobile Phone: [phone]  Relation: Daughter    Discharge Plan A: Home with family, Home Health  Discharge Plan B: Home Health, Long-term acute care facility (LTAC), Skilled Nursing Facility      [place], [place] - [address] [address]  Phone: [phone] Fax: [phone]      Initial Assessment (most recent)       Adult Discharge Assessment - 02/06/25 1406          Discharge Assessment    Assessment Type Discharge Planning Assessment     Confirmed/corrected address, phone number and insurance Yes     Source of Information family     Communicated JUAN DIEGO with patient/caregiver Date not available/Unable to determine     People in Home child(zeina), adult     Do you expect to return to your current living situation? Yes     Do you have help at home or someone to help you manage your care at home? Yes     Who are your caregiver(s) and their phone number(s)? dgtr     Prior to hospitilization cognitive status: Unable to Assess     Equipment Currently Used at Home rollator     Patient currently being followed by outpatient case management? No     Do you currently have service(s) that help you manage your care at home? Yes     Name and Contact number of agency [facility]     Is the pt/caregiver preference to resume services with current agency Yes     Do you take prescription  medications? Yes     Do you have prescription coverage? Yes     Coverage medicare     Do you have any problems affording any of your prescribed medications? No     Is the patient taking medications as prescribed? yes     Who is going to help you get home at discharge? dgtr     How do you get to doctors appointments? family or friend will provide     Are you on dialysis? No     Discharge Plan A Home with family;Home Health     Discharge Plan B Home Health;Long-term acute care facility (LTAC);Skilled Nursing Facility     DME Needed Upon Discharge  none     Discharge Plan discussed with: Adult children     Transition of Care Barriers None        Physical Activity    On average, how many days per week do you engage in moderate to strenuous exercise (like a brisk walk)? 0 days     On average, how many minutes do you engage in exercise at this level? 0 min                      Ss spoke with pt's guero hickey and pt lives at home with dgrosaline and is current with [facility]. Choice obtained and initial info faxed to [facility]. D/c plan is to return home with [facility]. Ss following for d/c needs.

## 2025-02-06 NOTE — ASSESSMENT & PLAN NOTE
Patient's blood pressure range in the last 24 hours was: BP  Min: 109/66  Max: 154/82.The patient's inpatient anti-hypertensive regimen is listed below:  Current Antihypertensives  amLODIPine tablet 10 mg, Daily, Oral  losartan-hydrochlorothiazide 50-12.5 mg per tablet 1 tablet, Daily, Oral  metoprolol succinate (TOPROL-XL) 24 hr tablet 25 mg, Daily, Oral  spironolactone tablet 25 mg, Daily, Oral    Plan  - BP is controlled, no changes needed to their regimen  - follow   Stop clonidine add Aldactone

## 2025-02-06 NOTE — PROGRESS NOTES
[facility] - Emergency Department  Hospital Medicine  Progress Note    Patient Name: Дмитрий Collazo  MRN: [id number]  Patient Class: IP- Inpatient   Admission Date: 2/5/2025  Length of Stay: 1 days  Attending Physician: José Miguel Kitchen DO  Primary Care Provider: Jennifer Zurita MD        Subjective     Principal Problem:Emphysematous cystitis        HPI:  79-year-old white female with a past medical history of Mobitz type 2 AV block status post pacemaker, EDGAR, hypertension, hyperlipidemia, diabetes, seizures, anemia who presents to the ED for hematuria.  She was in his show by [facility] for quite awhile awaiting transfer.  She finally became tired of waiting and self discharged then presented to [facility] for urologic evaluation.  Dr. Rodriguez in his seen.  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  No stone evident.  She has had some dysuria.  Denies any fevers.  Has chronic confusion.  Does endorse some vomiting as well.  Has otherwise been in her normal state of health.  Review of systems otherwise negative    Overview/Hospital Course:  2/6 Gnrs in the urine.  Cystoscopy today.  We will with hematuria    Interval History:  No acute events overnight    Review of Systems  Objective:     Vital Signs (Most Recent):  Temp: 98.9 °F (37.2 °C) (02/06/25 0743)  Pulse: 69 (02/06/25 0743)  Resp: 16 (02/06/25 0548)  BP: 107/74 (02/06/25 0930)  SpO2: 100 % (02/06/25 0743) Vital Signs (24h Range):  Temp:  [98.3 °F (36.8 °C)-99.2 °F (37.3 °C)] 98.9 °F (37.2 °C)  Pulse:  [69-76] 69  Resp:  [13-20] 16  SpO2:  [95 %-100 %] 100 %  BP: (107-183)/(66-82) 107/74     Weight: 64.4 kg (142 lb)  Body mass index is 26.83 kg/m².    Intake/Output Summary (Last 24 hours) at 2/6/2025 1010  Last data filed at 2/6/2025 0606  Gross per 24 hour   Intake 1000 ml   Output 2600 ml   Net -1600 ml         Physical Exam  Vitals reviewed.   Constitutional:       Appearance: Normal appearance.   HENT:       Head: Normocephalic and atraumatic.      Nose: Nose normal.   Eyes:      Extraocular Movements: Extraocular movements intact.      Conjunctiva/sclera: Conjunctivae normal.   Neck:      Trachea: Trachea normal.   Cardiovascular:      Rate and Rhythm: Normal rate and regular rhythm.      Pulses: Normal pulses.      Heart sounds: Normal heart sounds.   Pulmonary:      Effort: Pulmonary effort is normal.      Breath sounds: Normal breath sounds and air entry.   Abdominal:      General: Bowel sounds are normal.      Palpations: Abdomen is soft.   Genitourinary:     Comments: Hameed in place with bloody urine  Musculoskeletal:         General: Normal range of motion.      Cervical back: Neck supple.   Skin:     General: Skin is warm and dry.   Neurological:      General: No focal deficit present.      Mental Status: She is alert. She is disoriented.      Comments: Grossly normal motor and sensory function without focal deficit appreciated.   Psychiatric:         Mood and Affect: Mood and affect normal.         Behavior: Behavior is cooperative.             Significant Labs: All pertinent labs within the past 24 hours have been reviewed.    Significant Imaging: I have reviewed all pertinent imaging results/findings within the past 24 hours.    Assessment and Plan     * Emphysematous cystitis  Per Dr. Rodriguez.  Plan for OR tomorrow.  Cefepime  OR today.  Gram-negative rods is in urine.  Continue cefepime    DAVID (acute kidney injury)  DAVID is likely due to acute tubular necrosis caused by infection . Baseline creatinine is  1.0 . Most recent creatinine and eGFR are listed below.  Recent Labs     02/05/25  1231 02/06/25  0434   CREATININE 1.62* 1.08*   EGFRNORACEVR 32* 52*      Plan  - DAVID is improving  - Avoid nephrotoxins and renally dose meds for GFR listed above  - Monitor urine output, serial BMP, and adjust therapy as needed  - follow    Acute blood loss anemia  Anemia is likely due to acute blood loss which was from  "hematuria . Most recent hemoglobin and hematocrit are listed below.  Recent Labs     02/05/25  1231 02/06/25  0434   HGB 10.6* 9.8*   HCT 32.1* 30.3*       Plan  - Monitor serial CBC: Daily  - Transfuse PRBC if patient becomes hemodynamically unstable, symptomatic or H/H drops below 7/21.  - Patient has not received any PRBC transfusions to date  - Patient's anemia is currently stable  - follow  Follow hemoglobin    Nausea & vomiting  Zofran p.r.n.      Fatigue  Progressive mobility protocol      UTI (urinary tract infection)  Cefepime follow up cultures  Gram-negative rods    Seizures  Keppra      Anemia  Anemia is likely due to acute blood loss which was from hematuria . Most recent hemoglobin and hematocrit are listed below.  Recent Labs     02/05/25  1231 02/06/25  0434   HGB 10.6* 9.8*   HCT 32.1* 30.3*       Plan  - Monitor serial CBC: Daily  - Transfuse PRBC if patient becomes hemodynamically unstable, symptomatic or H/H drops below 7/21.  - Patient has not received any PRBC transfusions to date  - Patient's anemia is currently worsening. Will continue current treatment  - hold blood thinners  Follow    Other hydronephrosis  Per urology      Hyperglycemia due to diabetes mellitus  Sliding scale      Urinary retention  Hameed now in place  Still with hematuria    Cardiac pacemaker in situ  Stable      EDGAR (obstructive sleep apnea)  CPAP      AV block, Mobitz II  Pacemaker      Diabetes mellitus without complication  Patient's FSGs are uncontrolled due to hyperglycemia on current medication regimen.  Last A1c reviewed-   Lab Results   Component Value Date    HGBA1C 7.5 (H) 02/05/2025     Most recent fingerstick glucose reviewed- No results for input(s): "POCTGLUCOSE" in the last 24 hours.  Current correctional scale  Low  Maintain anti-hyperglycemic dose as follows-   Antihyperglycemics (From admission, onward)      Start     Stop Route Frequency Ordered    02/05/25 1944  insulin aspart U-100 injection 0-5 Units "         -- SubQ Before meals & nightly PRN 02/05/25 1845          Hold Oral hypoglycemics while patient is in the hospital.  A1c pending    Hyperlipidemia  Statin      Essential hypertension  Patient's blood pressure range in the last 24 hours was: BP  Min: 107/74  Max: 183/80.The patient's inpatient anti-hypertensive regimen is listed below:  Current Antihypertensives  amLODIPine tablet 10 mg, Daily, Oral  metoprolol succinate (TOPROL-XL) 24 hr tablet 25 mg, Daily, Oral  spironolactone tablet 25 mg, Daily, Oral  losartan tablet 50 mg, Daily, Oral  hydroCHLOROthiazide tablet 12.5 mg, Daily, Oral  , Daily, Oral    Plan  - BP is controlled, no changes needed to their regimen  - follow   Stop clonidine add Aldactone      VTE Risk Mitigation (From admission, onward)           Ordered     Reason for No Pharmacological VTE Prophylaxis  Once        Question:  Reasons:  Answer:  Active Bleeding    02/05/25 1845     IP VTE HIGH RISK PATIENT  Once         02/05/25 1845     Place sequential compression device  Until discontinued         02/05/25 1845                    Discharge Planning   JUAN DIEGO:      Code Status: Full Code   Medical Readiness for Discharge Date:            51 minutes spent on face to face patient interaction, discussion with family, ancillary staff, and/or other physicians as well as review of pertinent labs, images, and notes.           Please place Justification for DME        José Miguel Kitchen DO  Department of Hospital Medicine   [facility] - Emergency Department

## 2025-02-06 NOTE — PT/OT/SLP EVAL
Occupational Therapy   Evaluation    Name: Дмитрий Collazo  MRN: [id number]  Admitting Diagnosis: Emphysematous cystitis  Recent Surgery: * No surgery found *      Recommendations:     Discharge Recommendations: Moderate Intensity Therapy (to low intensity rehab)  Discharge Equipment Recommendations:  to be determined by next level of care  Barriers to discharge:  None    Assessment:     Дмитрий Collazo is a 79 y.o. female with a medical diagnosis of Emphysematous cystitis.  She presents with weakness and decline in ADLs. Performance deficits affecting function: weakness, impaired endurance, impaired self care skills, impaired functional mobility, gait instability, impaired balance.      Rehab Prognosis: Good; patient would benefit from acute skilled OT services to address these deficits and reach maximum level of function.       Plan:     Patient to be seen 5 x/week to address the above listed problems via self-care/home management, therapeutic activities, therapeutic exercises  Plan of Care Expires: 03/06/25  Plan of Care Reviewed with: patient    Subjective     Chief Complaint: emphysematous cystitis  Patient/Family Comments/goals: pt agreeable to OT eval    Occupational Profile:  Living Environment: pt lives with children/grandchildren in one story home with one step to enter  Previous level of function: independent with ADL tasks and utilized rollator for functional mobility   Roles and Routines: perform self care  Equipment Used at Home: rollator, shower chair  Assistance upon Discharge: swing bed versus home with home health    Pain/Comfort:  Pain Rating 1: 0/10    Patients cultural, spiritual, Voodoo conflicts given the current situation: no    Objective:     Communicated with: NIKOLAY Echols prior to session.  Patient found supine with dove catheter, peripheral IV, pulse ox (continuous) upon OT entry to room.    General Precautions: Standard, fall  Orthopedic Precautions: N/A  Braces:    Respiratory Status: Room  air    Occupational Performance:    Bed Mobility:    Patient completed Supine to Sit with minimum assistance  Patient completed Sit to Supine with minimum assistance    Functional Mobility/Transfers:  Patient completed Sit <> Stand Transfer with minimum assistance  with  rolling walker   Functional Mobility: pt performed steps to HOB with CGA with RW    Activities of Daily Living:  Lower Body Dressing: maximal assistance to jorgito socks    Cognitive/Visual Perceptual:  Cognitive/Psychosocial Skills:     -       Oriented to: Person, Place, and Situation   -       Follows Commands/attention:Follows multistep  commands  -       Mood/Affect/Coping skills/emotional control: Cooperative    Physical Exam:  Balance:    -       sitting balance SBA; standing balance CGA with RW  Upper Extremity Range of Motion:     -       Right Upper Extremity: WFL  -       Left Upper Extremity: WFL  Upper Extremity Strength:    -       Right Upper Extremity: 4/5  -       Left Upper Extremity: 4/5  Gross motor coordination:   WFL    AMPAC 6 Click ADL:  AMPAC Total Score: 15    Treatment & Education:  Pt educated on OT role/POC.   Importance of OOB activity with staff assistance.  Importance of sitting up in the chair throughout the day as tolerated, especially for meals   Safety during functional t/f and mobility with use of RW  Importance of assisting with self-care activities   All questions/concerns answered within OT scope of practice      Patient left HOB elevated with all lines intact, call button in reach, bed alarm on, and NIKOLAY Echols notified    GOALS:   Multidisciplinary Problems       Occupational Therapy Goals          Problem: Occupational Therapy    Goal Priority Disciplines Outcome Interventions   Occupational Therapy Goal     OT, PT/OT Progressing    Description: STG:  Pt will perform grooming with setup  Pt will bathe with Tegan with setup at EOB  Pt will perform UE dressing with Tracy  Pt will perform LE dressing with Tegan  Pt  will sit EOB x 10 min with SBA  Pt will transfer bed/chair/bsc with CGA with RW  Pt will perform standing task x 2 min with CGA with RW   Pt will tolerate 15 minutes of tx without fatigue      LT.Restore to max I with self care and mobility.                           DME Justifications:  No DME recommended requiring DME justifications    History:     Past Medical History:   Diagnosis Date    CVA (cerebrovascular accident)     HTN (hypertension)     Mixed hyperlipidemia     Thyroiditis, unspecified     Type 2 diabetes mellitus with unspecified diabetic retinopathy without macular edema          Past Surgical History:   Procedure Laterality Date    A-V CARDIAC PACEMAKER INSERTION Left 2024    Procedure: INSERTION, CARDIAC PACEMAKER, DUAL CHAMBER;  Surgeon: Ricci Luciano MD;  Location: [facility] CATH LAB;  Service: Cardiology;  Laterality: Left;    BRAIN SURGERY      HYSTERECTOMY      KNEE SURGERY      TUBAL LIGATION         Time Tracking:     OT Date of Treatment: 25  OT Start Time: 1147  OT Stop Time: 1201  OT Total Time (min): 14 min    Billable Minutes:Evaluation OT min complexity eval    2025

## 2025-02-06 NOTE — NURSING
Spoke with Dr. Villalpando. No surgery at present. Will keep dove for 2 months and continue to treat with antibiotics for 2-3 weeks.

## 2025-02-06 NOTE — SUBJECTIVE & OBJECTIVE
Past Medical History:   Diagnosis Date    CVA (cerebrovascular accident)     HTN (hypertension)     Mixed hyperlipidemia     Thyroiditis, unspecified     Type 2 diabetes mellitus with unspecified diabetic retinopathy without macular edema        Past Surgical History:   Procedure Laterality Date    A-V CARDIAC PACEMAKER INSERTION Left 03/05/2024    Procedure: INSERTION, CARDIAC PACEMAKER, DUAL CHAMBER;  Surgeon: Ricci Luciano MD;  Location: [facility] CATH LAB;  Service: Cardiology;  Laterality: Left;    BRAIN SURGERY      HYSTERECTOMY      KNEE SURGERY      TUBAL LIGATION         Review of patient's allergies indicates:   Allergen Reactions    Celecoxib     Codeine     Penicillins        No current facility-administered medications on file prior to encounter.     Current Outpatient Medications on File Prior to Encounter   Medication Sig    amLODIPine (NORVASC) 10 MG tablet Take 10 mg by mouth once daily.    irbesartan-hydrochlorothiazide (AVALIDE) 300-12.5 mg per tablet Take 1 tablet by mouth once daily.    levETIRAcetam (KEPPRA) 500 MG Tab Take 1 tablet by mouth 2 (two) times daily.    levothyroxine (SYNTHROID) 100 MCG tablet Take 100 mcg by mouth before breakfast.    metFORMIN (GLUCOPHAGE) 1000 MG tablet Take 500 mg by mouth 2 (two) times daily.    metoprolol succinate (TOPROL-XL) 25 MG 24 hr tablet Take 1 tablet (25 mg total) by mouth once daily.    omeprazole (PRILOSEC) 20 MG capsule Take 20 mg by mouth once daily.    oxybutynin (DITROPAN) 5 MG Tab Take 5 mg by mouth 2 (two) times daily.    sertraline (ZOLOFT) 100 MG tablet Take 100 mg by mouth once daily.    simvastatin (ZOCOR) 40 MG tablet Take 40 mg by mouth every evening.    [DISCONTINUED] cloNIDine (CATAPRES) 0.1 MG tablet Take 0.1 mg by mouth 3 (three) times daily.    [DISCONTINUED] clopidogreL (PLAVIX) 75 mg tablet Take by mouth. (Patient not taking: Reported on 7/10/2024)    [DISCONTINUED] hydrALAZINE (APRESOLINE) 100 MG tablet Take 100 mg by mouth  every 8 (eight) hours.    [DISCONTINUED] meclizine (ANTIVERT) 12.5 mg tablet Take 12.5 mg by mouth 2 (two) times daily as needed.    [DISCONTINUED] sertraline (ZOLOFT) 50 MG tablet  (Patient not taking: Reported on 7/10/2024)     Family History       Problem Relation (Age of Onset)    Diabetes Mellitus Sister, Sister    Heart disease Sister    Hypertension Mother, Father    Stroke Mother          Tobacco Use    Smoking status: Never    Smokeless tobacco: Never   Substance and Sexual Activity    Alcohol use: Never    Drug use: Never    Sexual activity: Not Currently     Review of Systems   Constitutional:  Positive for fatigue. Negative for chills, fever and unexpected weight change.   HENT:  Negative for congestion, mouth sores and sore throat.    Eyes:  Negative for photophobia and visual disturbance.   Respiratory:  Negative for cough, chest tightness, shortness of breath and wheezing.    Cardiovascular:  Negative for chest pain, palpitations and leg swelling.   Gastrointestinal:  Positive for vomiting. Negative for abdominal pain, diarrhea and nausea.   Endocrine: Negative for cold intolerance and heat intolerance.   Genitourinary:  Positive for hematuria. Negative for difficulty urinating, dysuria, frequency and urgency.   Musculoskeletal:  Negative for arthralgias, back pain and myalgias.   Skin:  Negative for pallor and rash.   Neurological:  Negative for tremors, seizures, syncope, weakness, numbness and headaches.   Hematological:  Does not bruise/bleed easily.   Psychiatric/Behavioral:  Negative for agitation, confusion, hallucinations and suicidal ideas.      Objective:     Vital Signs (Most Recent):  Temp: 99.2 °F (37.3 °C) (02/05/25 1846)  Pulse: 76 (02/05/25 1846)  Resp: 20 (02/05/25 1327)  BP: (!) 154/82 (02/05/25 1846)  SpO2: 95 % (02/05/25 1846) Vital Signs (24h Range):  Temp:  [99.1 °F (37.3 °C)-99.2 °F (37.3 °C)] 99.2 °F (37.3 °C)  Pulse:  [74-76] 76  Resp:  [20] 20  SpO2:  [95 %-97 %] 95 %  BP:  (109-154)/(66-82) 154/82     Weight: 64.4 kg (142 lb)  Body mass index is 26.83 kg/m².     Physical Exam  Vitals reviewed.   Constitutional:       Appearance: Normal appearance.   HENT:      Head: Normocephalic and atraumatic.      Nose: Nose normal.   Eyes:      Extraocular Movements: Extraocular movements intact.      Conjunctiva/sclera: Conjunctivae normal.   Neck:      Trachea: Trachea normal.   Cardiovascular:      Rate and Rhythm: Normal rate and regular rhythm.      Pulses: Normal pulses.      Heart sounds: Normal heart sounds.   Pulmonary:      Effort: Pulmonary effort is normal.      Breath sounds: Normal breath sounds and air entry.   Abdominal:      General: Bowel sounds are normal.      Palpations: Abdomen is soft.   Genitourinary:     Comments: Hameed in place with bloody urine  Musculoskeletal:         General: Normal range of motion.      Cervical back: Neck supple.   Skin:     General: Skin is warm and dry.   Neurological:      General: No focal deficit present.      Mental Status: She is alert. She is disoriented.      Comments: Grossly normal motor and sensory function without focal deficit appreciated.   Psychiatric:         Mood and Affect: Mood and affect normal.         Behavior: Behavior is cooperative.                Significant Labs: All pertinent labs within the past 24 hours have been reviewed.    Significant Imaging: I have reviewed all pertinent imaging results/findings within the past 24 hours.

## 2025-02-06 NOTE — ASSESSMENT & PLAN NOTE
Pacemaker     Pt arrives with a rash to her torso for the last few days. Denies any new products.

## 2025-02-06 NOTE — ASSESSMENT & PLAN NOTE
DAVID is likely due to  sepsis . Baseline creatinine is  1.4 . Most recent creatinine and eGFR are listed below.  Recent Labs     02/05/25  1231   CREATININE 1.62*   EGFRNORACEVR 32*      Plan  - DAVID is stable  - Avoid nephrotoxins and renally dose meds for GFR listed above  - Monitor urine output, serial BMP, and adjust therapy as needed  - follow    Renal function near baseline

## 2025-02-06 NOTE — HOSPITAL COURSE
2/6 Gnrs in the urine.  Cystoscopy today.  We will with hematuria  2/7 urine cultures reviewed, started cipro per culture, will need IV over the next few days then evaluate for po and discharge  2/8 continuing cipro, Noted: has history of hemorrhagic stroke 2 months ago, no DVT ppx, BP elevated today if continues tomorrow will increase meds  2/9 discuss with nephrology tomorrow, will likely be able to DC with po Cipro  2/10 discussed with urology, ok to go home on PO cipro today and follow up in their office as instructed, patient to keep dove in until urology discontinues it.

## 2025-02-06 NOTE — ASSESSMENT & PLAN NOTE
DAVID is likely due to acute tubular necrosis caused by infection . Baseline creatinine is  1.0 . Most recent creatinine and eGFR are listed below.  Recent Labs     02/05/25  1231 02/06/25  0434   CREATININE 1.62* 1.08*   EGFRNORACEVR 32* 52*      Plan  - DAVID is improving  - Avoid nephrotoxins and renally dose meds for GFR listed above  - Monitor urine output, serial BMP, and adjust therapy as needed  - follow

## 2025-02-06 NOTE — ASSESSMENT & PLAN NOTE
Anemia is likely due to acute blood loss which was from hematuria . Most recent hemoglobin and hematocrit are listed below.  Recent Labs     02/05/25  1231 02/06/25  0434   HGB 10.6* 9.8*   HCT 32.1* 30.3*       Plan  - Monitor serial CBC: Daily  - Transfuse PRBC if patient becomes hemodynamically unstable, symptomatic or H/H drops below 7/21.  - Patient has not received any PRBC transfusions to date  - Patient's anemia is currently stable  - follow  Follow hemoglobin

## 2025-02-06 NOTE — PROGRESS NOTES
[facility] - Emergency Department  Urology  Progress Note    Patient Name: Дмитрий Collazo  MRN: [id number]  Admission Date: 2/5/2025  Hospital Length of Stay: 1 days  Code Status: Full Code   Attending Provider: José Miguel Kitchen DO   Primary Care Physician: Jennifer Zurita MD    Subjective:     HPI:  The patient is an 79-year-old female that was recently released from an outside facility with a complicated urinary tract infection and presented to the ER with emphysematous cystitis.  The patient has a history of diabetes mellitus and a previous urinary tract infection that has failed to clear.  A comprehensive examination was performed demonstrating acute kidney injury with bladder distention and a noncontrast CT scan of the abdomen and pelvis was performed demonstrating emphysematous cystitis.  The patient is accompanied by her daughter and reports that they were released from the outside hospital to seek urology.  Upon arrival in the emergency room Urology was consulted for emphysematous cystitis with bladder outlet obstruction and a Hameed catheter was placed upon arrival.  The patient is tolerating the catheter.  She is having gross hematuria.     Interval History:  79-year-old female resting in bed with son at bedside.  Indwelling Hameed catheter with 2 L Hameed bag attached intact and draining, hematuria present.  Creatinine improved from 1.62 yesterday to 1.08 today.  Hemoglobin 9.8.  Continue Hameed catheter.  Adjust antibiotics per culture.    Review of Systems   Constitutional:  Positive for activity change.   HENT:  Negative for voice change.    Eyes:  Negative for discharge.   Respiratory:  Negative for cough and shortness of breath.    Cardiovascular:  Negative for chest pain.   Genitourinary:  Positive for hematuria.   Skin:  Negative for color change.   Neurological:  Negative for tremors.   Psychiatric/Behavioral:  Negative for agitation.      Objective:     Temp:  [98.3 °F (36.8 °C)-99.2 °F (37.3  °C)] 98.9 °F (37.2 °C)  Pulse:  [69-76] 69  Resp:  [13-20] 16  SpO2:  [95 %-100 %] 100 %  BP: (107-183)/(66-82) 107/74     Body mass index is 26.83 kg/m².           Drains       None                    Physical Exam  Vitals and nursing note reviewed.   Constitutional:       General: She is not in acute distress.  HENT:      Head: Normocephalic.   Eyes:      Conjunctiva/sclera: Conjunctivae normal.   Cardiovascular:      Rate and Rhythm: Bradycardia present.   Pulmonary:      Effort: Pulmonary effort is normal. No respiratory distress.   Genitourinary:     Comments: Indwelling Hameed catheter with 2 L Hameed bag attached intact and draining, hematuria present  Musculoskeletal:      Cervical back: Normal range of motion.   Skin:     General: Skin is warm and dry.   Neurological:      General: No focal deficit present.      Mental Status: She is alert.   Psychiatric:         Mood and Affect: Mood normal.         Behavior: Behavior normal.           Significant Labs:    BMP:  Recent Labs   Lab 02/05/25  1231 02/06/25  0434    140   K 4.1 3.8   * 110*   CO2 22* 19*   BUN 18 12   CREATININE 1.62* 1.08*   CALCIUM 8.9 9.1       CBC:   Recent Labs   Lab 02/05/25  1231 02/06/25  0434   WBC 9.50 6.92   HGB 10.6* 9.8*   HCT 32.1* 30.3*    181       All pertinent labs results from the past 24 hours have been reviewed.    Significant Imaging:  All pertinent imaging results/findings from the past 24 hours have been reviewed.                  Assessment/Plan:     * Emphysematous cystitis  Maintain Hameed catheter     UTI (urinary tract infection)  Adjust antibiotics per urine culture    Other hydronephrosis       Hyperglycemia due to diabetes mellitus       Urinary retention  Maintain Hameed catheter         VTE Risk Mitigation (From admission, onward)           Ordered     Reason for No Pharmacological VTE Prophylaxis  Once        Question:  Reasons:  Answer:  Active Bleeding    02/05/25 1845     IP VTE HIGH RISK  PATIENT  Once         02/05/25 1845     Place sequential compression device  Until discontinued         02/05/25 1845                    Ricci Valdez NP  Urology  [facility] - Emergency Department

## 2025-02-06 NOTE — ASSESSMENT & PLAN NOTE
Anemia is likely due to acute blood loss which was from hematuria . Most recent hemoglobin and hematocrit are listed below.  Recent Labs     02/05/25  1231   HGB 10.6*   HCT 32.1*     Plan  - Monitor serial CBC: Daily  - Transfuse PRBC if patient becomes hemodynamically unstable, symptomatic or H/H drops below 7/21.  - Patient has not received any PRBC transfusions to date  - Patient's anemia is currently stable  - follow

## 2025-02-06 NOTE — PT/OT/SLP EVAL
Physical Therapy Evaluation    Patient Name:  Дмитрий Collazo   MRN:  [id number]    Recommendations:     Discharge Recommendations: Moderate Intensity Therapy (to low intensity)   Discharge Equipment Recommendations: to be determined by next level of care   Barriers to discharge:  ongoing treatment    Assessment:     Дмитрий Collazo is a 79 y.o. female admitted with a medical diagnosis of Emphysematous cystitis.  She presents with the following impairments/functional limitations: weakness, impaired endurance, impaired self care skills, impaired functional mobility, impaired cognition     Patient agreeable to evaluation but pleasantly confused. Unable to give reliable history regarding home environment. Was able to take a few steps in room but is weak and has had several falls at home. May benefit from swb at dc depending on progress    Rehab Prognosis: Good; patient would benefit from acute skilled PT services to address these deficits and reach maximum level of function.    Recent Surgery: * No surgery found *      Plan:     During this hospitalization, patient to be seen 5 x/week to address the identified rehab impairments via gait training, therapeutic activities, therapeutic exercises, neuromuscular re-education and progress toward the following goals:    Plan of Care Expires:  03/06/25    Subjective     Chief Complaint: na  Patient/Family Comments/goals: agreeable  Pain/Comfort:  Pain Rating 1: 0/10    Patients cultural, spiritual, Mosque conflicts given the current situation:      Living Environment:  Home with grandkids  Prior to admission, patients level of function was needing some assistance with family with mobility.  Equipment used at home: rollator, shower chair.  DME owned (not currently used): none.  Upon discharge, patient will have assistance from tbd.    Objective:     Communicated with nurse prior to session.  Patient found HOB elevated with dove catheter, peripheral IV, pulse ox (continuous)   upon PT entry to room.    General Precautions: Standard, fall  Orthopedic Precautions:    Braces:    Respiratory Status: Room air    Exams:  RLE ROM: WFL  RLE Strength: 4/5  LLE ROM: WNL  LLE Strength: 4/5    Functional Mobility:  Bed Mobility:     Supine to Sit: minimum assistance  Sit to Supine: minimum assistance  Transfers:     Sit to Stand:  minimum assistance with rolling walker  Gait: 5 ft with RW and CGA; fearful of falling  Balance: fair      AM-PAC 6 CLICK MOBILITY  Total Score:17       Treatment & Education:  Patient educated on the role of physical therapy in the acute care setting, call light usage, and safety including calling nurse for mobility  Patient educated on importance of OOB activity   PT answered all patient questions within PT scope of practice  Mobility as noted      Patient left HOB elevated with all lines intact, call button in reach, and nurse notified.    GOALS:   Multidisciplinary Problems       Physical Therapy Goals          Problem: Physical Therapy    Goal Priority Disciplines Outcome Interventions   Physical Therapy Goal     PT, PT/OT Progressing    Description: Short term goals:  . Supine to sit with Contact Guard Assistance  . Sit to supine with Contact Guard Assistance  . Sit to stand transfer with Contact Guard Assistance  . Gait  x 50 feet with Contact Guard Assistance using Rolling Walker.     Long term goals:  Patient will regain full independent functional mobility with lowest level of assistive device to return to desired living arrangement and prior ADL's.                          DME Justifications:  TBD    History:     Past Medical History:   Diagnosis Date    CVA (cerebrovascular accident)     HTN (hypertension)     Mixed hyperlipidemia     Thyroiditis, unspecified     Type 2 diabetes mellitus with unspecified diabetic retinopathy without macular edema        Past Surgical History:   Procedure Laterality Date    A-V CARDIAC PACEMAKER INSERTION Left 03/05/2024     Procedure: INSERTION, CARDIAC PACEMAKER, DUAL CHAMBER;  Surgeon: Ricci Luciano MD;  Location: [facility] CATH LAB;  Service: Cardiology;  Laterality: Left;    BRAIN SURGERY      HYSTERECTOMY      KNEE SURGERY      TUBAL LIGATION         Time Tracking:     PT Received On: 02/06/25  PT Start Time: 1147     PT Stop Time: 1201  PT Total Time (min): 14 min     Billable Minutes: Evaluation 14      02/06/2025

## 2025-02-06 NOTE — PLAN OF CARE
Problem: Physical Therapy  Goal: Physical Therapy Goal  Description: Short term goals:  . Supine to sit with Contact Guard Assistance  . Sit to supine with Contact Guard Assistance  . Sit to stand transfer with Contact Guard Assistance  . Gait  x 50 feet with Contact Guard Assistance using Rolling Walker.     Long term goals:  Patient will regain full independent functional mobility with lowest level of assistive device to return to desired living arrangement and prior ADL's.     Outcome: Progressing

## 2025-02-06 NOTE — ASSESSMENT & PLAN NOTE
Anemia is likely due to acute blood loss which was from hematuria . Most recent hemoglobin and hematocrit are listed below.  Recent Labs     02/05/25  1231 02/06/25  0434   HGB 10.6* 9.8*   HCT 32.1* 30.3*       Plan  - Monitor serial CBC: Daily  - Transfuse PRBC if patient becomes hemodynamically unstable, symptomatic or H/H drops below 7/21.  - Patient has not received any PRBC transfusions to date  - Patient's anemia is currently worsening. Will continue current treatment  - hold blood thinners  Follow

## 2025-02-06 NOTE — HPI
79-year-old white female with a past medical history of Mobitz type 2 AV block status post pacemaker, EDGAR, hypertension, hyperlipidemia, diabetes, seizures, anemia who presents to the ED for hematuria.  She was in his show by General for quite awhile awaiting transfer.  She finally became tired of waiting and self discharged then presented to [facility] for urologic evaluation.  Dr. Rodriguez in his seen.  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  No stone evident.  She has had some dysuria.  Denies any fevers.  Has chronic confusion.  Does endorse some vomiting as well.  Has otherwise been in her normal state of health.  Review of systems otherwise negative

## 2025-02-06 NOTE — PHARMACY MED REC
"Admission Medication History     The home medication history was taken by Alondra Alonzo.    You may go to "Admission" then "Reconcile Home Medications" tabs to review and/or act upon these items.     The home medication list has been updated by the Pharmacy department.   Please read ALL comments highlighted in yellow.   Please address this information as you see fit.    Feel free to contact us if you have any questions or require assistance.  Medications Added:  Irbesartan 150 mg  Potassium Chloride 20 meq      Patient reports no longer taking the following medication(s). The medication(s) listed below were removed from the home medication list. Please reorder if appropriate:  Irbesartan-hydrochlorothiazide 300-12.5 mg  Metformin 1,000 mg    Medications listed below were obtained from: Patient/family and Analytic software- [employer]  (Not in a hospital admission)        Current Outpatient Medications on File Prior to Encounter   Medication Sig Dispense Refill Last Dose/Taking    amLODIPine (NORVASC) 10 MG tablet Take 10 mg by mouth once daily.   2/5/2025    irbesartan (AVAPRO) 150 MG tablet Take 150 mg by mouth once daily.   2/4/2025    levETIRAcetam (KEPPRA) 500 MG Tab Take 1 tablet by mouth 2 (two) times daily.   2/4/2025    levothyroxine (SYNTHROID) 100 MCG tablet Take 100 mcg by mouth before breakfast.   2/4/2025    metoprolol succinate (TOPROL-XL) 25 MG 24 hr tablet Take 1 tablet (25 mg total) by mouth once daily. 30 tablet 5 2/4/2025    omeprazole (PRILOSEC) 20 MG capsule Take 20 mg by mouth once daily.   2/4/2025    oxybutynin (DITROPAN) 5 MG Tab Take 5 mg by mouth 2 (two) times daily.   2/4/2025    potassium chloride (K-TAB) 20 mEq Take 20 mEq by mouth 2 (two) times daily.   2/4/2025    sertraline (ZOLOFT) 100 MG tablet Take 100 mg by mouth once daily.   2/4/2025    simvastatin (ZOCOR) 40 MG tablet Take 40 mg by mouth every evening.   2/4/2025    [DISCONTINUED] cloNIDine (CATAPRES) 0.1 MG tablet Take 0.1 mg by " mouth 3 (three) times daily.   2/4/2025    [DISCONTINUED] hydrALAZINE (APRESOLINE) 100 MG tablet Take 100 mg by mouth every 8 (eight) hours.   2/5/2025    [DISCONTINUED] meclizine (ANTIVERT) 12.5 mg tablet Take 12.5 mg by mouth 2 (two) times daily as needed.   2/4/2025    [DISCONTINUED] clopidogreL (PLAVIX) 75 mg tablet Take by mouth. (Patient not taking: Reported on 7/10/2024)       [DISCONTINUED] irbesartan-hydrochlorothiazide (AVALIDE) 300-12.5 mg per tablet Take 1 tablet by mouth once daily.       [DISCONTINUED] metFORMIN (GLUCOPHAGE) 1000 MG tablet Take 500 mg by mouth 2 (two) times daily.       [DISCONTINUED] sertraline (ZOLOFT) 50 MG tablet  (Patient not taking: Reported on 7/10/2024)            Potential issues to be addressed PRIOR TO DISCHARGE  Patient's daughter mentioned she was uncertain if her mother was taking the Potassium twice daily, but did know she took it at least once daily.    Alondra Alonzo  Medication Access Specialist  EXT. [phone]    .

## 2025-02-06 NOTE — SUBJECTIVE & OBJECTIVE
Interval History:  No acute events overnight    Review of Systems  Objective:     Vital Signs (Most Recent):  Temp: 98.9 °F (37.2 °C) (02/06/25 0743)  Pulse: 69 (02/06/25 0743)  Resp: 16 (02/06/25 0548)  BP: 107/74 (02/06/25 0930)  SpO2: 100 % (02/06/25 0743) Vital Signs (24h Range):  Temp:  [98.3 °F (36.8 °C)-99.2 °F (37.3 °C)] 98.9 °F (37.2 °C)  Pulse:  [69-76] 69  Resp:  [13-20] 16  SpO2:  [95 %-100 %] 100 %  BP: (107-183)/(66-82) 107/74     Weight: 64.4 kg (142 lb)  Body mass index is 26.83 kg/m².    Intake/Output Summary (Last 24 hours) at 2/6/2025 1010  Last data filed at 2/6/2025 0606  Gross per 24 hour   Intake 1000 ml   Output 2600 ml   Net -1600 ml         Physical Exam  Vitals reviewed.   Constitutional:       Appearance: Normal appearance.   HENT:      Head: Normocephalic and atraumatic.      Nose: Nose normal.   Eyes:      Extraocular Movements: Extraocular movements intact.      Conjunctiva/sclera: Conjunctivae normal.   Neck:      Trachea: Trachea normal.   Cardiovascular:      Rate and Rhythm: Normal rate and regular rhythm.      Pulses: Normal pulses.      Heart sounds: Normal heart sounds.   Pulmonary:      Effort: Pulmonary effort is normal.      Breath sounds: Normal breath sounds and air entry.   Abdominal:      General: Bowel sounds are normal.      Palpations: Abdomen is soft.   Genitourinary:     Comments: Hameed in place with bloody urine  Musculoskeletal:         General: Normal range of motion.      Cervical back: Neck supple.   Skin:     General: Skin is warm and dry.   Neurological:      General: No focal deficit present.      Mental Status: She is alert. She is disoriented.      Comments: Grossly normal motor and sensory function without focal deficit appreciated.   Psychiatric:         Mood and Affect: Mood and affect normal.         Behavior: Behavior is cooperative.             Significant Labs: All pertinent labs within the past 24 hours have been reviewed.    Significant Imaging: I  have reviewed all pertinent imaging results/findings within the past 24 hours.

## 2025-02-06 NOTE — SUBJECTIVE & OBJECTIVE
Interval History:  79-year-old female resting in bed with son at bedside.  Indwelling Hameed catheter with 2 L Hameed bag attached intact and draining, hematuria present.  Creatinine improved from 1.62 yesterday to 1.08 today.  Hemoglobin 9.8.  Continue Hameed catheter.  Adjust antibiotics per culture.    Review of Systems   Constitutional:  Positive for activity change.   HENT:  Negative for voice change.    Eyes:  Negative for discharge.   Respiratory:  Negative for cough and shortness of breath.    Cardiovascular:  Negative for chest pain.   Genitourinary:  Positive for hematuria.   Skin:  Negative for color change.   Neurological:  Negative for tremors.   Psychiatric/Behavioral:  Negative for agitation.      Objective:     Temp:  [98.3 °F (36.8 °C)-99.2 °F (37.3 °C)] 98.9 °F (37.2 °C)  Pulse:  [69-76] 69  Resp:  [13-20] 16  SpO2:  [95 %-100 %] 100 %  BP: (107-183)/(66-82) 107/74     Body mass index is 26.83 kg/m².           Drains       None                    Physical Exam  Vitals and nursing note reviewed.   Constitutional:       General: She is not in acute distress.  HENT:      Head: Normocephalic.   Eyes:      Conjunctiva/sclera: Conjunctivae normal.   Cardiovascular:      Rate and Rhythm: Bradycardia present.   Pulmonary:      Effort: Pulmonary effort is normal. No respiratory distress.   Genitourinary:     Comments: Indwelling Hameed catheter with 2 L Hameed bag attached intact and draining, hematuria present  Musculoskeletal:      Cervical back: Normal range of motion.   Skin:     General: Skin is warm and dry.   Neurological:      General: No focal deficit present.      Mental Status: She is alert.   Psychiatric:         Mood and Affect: Mood normal.         Behavior: Behavior normal.           Significant Labs:    BMP:  Recent Labs   Lab 02/05/25  1231 02/06/25  0434    140   K 4.1 3.8   * 110*   CO2 22* 19*   BUN 18 12   CREATININE 1.62* 1.08*   CALCIUM 8.9 9.1       CBC:   Recent Labs   Lab  02/05/25  1231 02/06/25  0434   WBC 9.50 6.92   HGB 10.6* 9.8*   HCT 32.1* 30.3*    181       All pertinent labs results from the past 24 hours have been reviewed.    Significant Imaging:  All pertinent imaging results/findings from the past 24 hours have been reviewed.

## 2025-02-06 NOTE — ASSESSMENT & PLAN NOTE
Patient's blood pressure range in the last 24 hours was: BP  Min: 107/74  Max: 183/80.The patient's inpatient anti-hypertensive regimen is listed below:  Current Antihypertensives  amLODIPine tablet 10 mg, Daily, Oral  metoprolol succinate (TOPROL-XL) 24 hr tablet 25 mg, Daily, Oral  spironolactone tablet 25 mg, Daily, Oral  losartan tablet 50 mg, Daily, Oral  hydroCHLOROthiazide tablet 12.5 mg, Daily, Oral  , Daily, Oral    Plan  - BP is controlled, no changes needed to their regimen  - follow   Stop clonidine add Aldactone

## 2025-02-06 NOTE — H&P
[facility]  [facility] Medicine  History & Physical    Patient Name: Дмитрий Collazo  MRN: [id number]  Patient Class: IP- Inpatient  Admission Date: 2/5/2025  Attending Physician: No att. providers found   Primary Care Provider: Jennifer Zurita MD         Patient information was obtained from patient, past medical records, and ER records.     Subjective:     Principal Problem:Emphysematous cystitis    Chief Complaint:   Chief Complaint   Patient presents with    Flank Pain     Presents to er with complaint of flank pain, abdominal pain, dysuria, blood in urine. Was seen at [facility] and admitted on Monday. Has copies of records with        HPI: 79-year-old white female with a past medical history of Mobitz type 2 AV block status post pacemaker, EDGAR, hypertension, hyperlipidemia, diabetes, seizures, anemia who presents to the ED for hematuria.  She was in his show by General for quite awhile awaiting transfer.  She finally became tired of waiting and self discharged then presented to [facility] for urologic evaluation.  Dr. Rodriguez in his seen.  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  No stone evident.  She has had some dysuria.  Denies any fevers.  Has chronic confusion.  Does endorse some vomiting as well.  Has otherwise been in her normal state of health.  Review of systems otherwise negative    Past Medical History:   Diagnosis Date    CVA (cerebrovascular accident)     HTN (hypertension)     Mixed hyperlipidemia     Thyroiditis, unspecified     Type 2 diabetes mellitus with unspecified diabetic retinopathy without macular edema        Past Surgical History:   Procedure Laterality Date    A-V CARDIAC PACEMAKER INSERTION Left 03/05/2024    Procedure: INSERTION, CARDIAC PACEMAKER, DUAL CHAMBER;  Surgeon: Ricci Luciano MD;  Location: [facility] CATH LAB;  Service: Cardiology;  Laterality: Left;    BRAIN SURGERY      HYSTERECTOMY       KNEE SURGERY      TUBAL LIGATION         Review of patient's allergies indicates:   Allergen Reactions    Celecoxib     Codeine     Penicillins        No current facility-administered medications on file prior to encounter.     Current Outpatient Medications on File Prior to Encounter   Medication Sig    amLODIPine (NORVASC) 10 MG tablet Take 10 mg by mouth once daily.    irbesartan-hydrochlorothiazide (AVALIDE) 300-12.5 mg per tablet Take 1 tablet by mouth once daily.    levETIRAcetam (KEPPRA) 500 MG Tab Take 1 tablet by mouth 2 (two) times daily.    levothyroxine (SYNTHROID) 100 MCG tablet Take 100 mcg by mouth before breakfast.    metFORMIN (GLUCOPHAGE) 1000 MG tablet Take 500 mg by mouth 2 (two) times daily.    metoprolol succinate (TOPROL-XL) 25 MG 24 hr tablet Take 1 tablet (25 mg total) by mouth once daily.    omeprazole (PRILOSEC) 20 MG capsule Take 20 mg by mouth once daily.    oxybutynin (DITROPAN) 5 MG Tab Take 5 mg by mouth 2 (two) times daily.    sertraline (ZOLOFT) 100 MG tablet Take 100 mg by mouth once daily.    simvastatin (ZOCOR) 40 MG tablet Take 40 mg by mouth every evening.    [DISCONTINUED] cloNIDine (CATAPRES) 0.1 MG tablet Take 0.1 mg by mouth 3 (three) times daily.    [DISCONTINUED] clopidogreL (PLAVIX) 75 mg tablet Take by mouth. (Patient not taking: Reported on 7/10/2024)    [DISCONTINUED] hydrALAZINE (APRESOLINE) 100 MG tablet Take 100 mg by mouth every 8 (eight) hours.    [DISCONTINUED] meclizine (ANTIVERT) 12.5 mg tablet Take 12.5 mg by mouth 2 (two) times daily as needed.    [DISCONTINUED] sertraline (ZOLOFT) 50 MG tablet  (Patient not taking: Reported on 7/10/2024)     Family History       Problem Relation (Age of Onset)    Diabetes Mellitus Sister, Sister    Heart disease Sister    Hypertension Mother, Father    Stroke Mother          Tobacco Use    Smoking status: Never    Smokeless tobacco: Never   Substance and Sexual Activity    Alcohol use: Never    Drug use: Never    Sexual  activity: Not Currently     Review of Systems   Constitutional:  Positive for fatigue. Negative for chills, fever and unexpected weight change.   HENT:  Negative for congestion, mouth sores and sore throat.    Eyes:  Negative for photophobia and visual disturbance.   Respiratory:  Negative for cough, chest tightness, shortness of breath and wheezing.    Cardiovascular:  Negative for chest pain, palpitations and leg swelling.   Gastrointestinal:  Positive for vomiting. Negative for abdominal pain, diarrhea and nausea.   Endocrine: Negative for cold intolerance and heat intolerance.   Genitourinary:  Positive for hematuria. Negative for difficulty urinating, dysuria, frequency and urgency.   Musculoskeletal:  Negative for arthralgias, back pain and myalgias.   Skin:  Negative for pallor and rash.   Neurological:  Negative for tremors, seizures, syncope, weakness, numbness and headaches.   Hematological:  Does not bruise/bleed easily.   Psychiatric/Behavioral:  Negative for agitation, confusion, hallucinations and suicidal ideas.      Objective:     Vital Signs (Most Recent):  Temp: 99.2 °F (37.3 °C) (02/05/25 1846)  Pulse: 76 (02/05/25 1846)  Resp: 20 (02/05/25 1327)  BP: (!) 154/82 (02/05/25 1846)  SpO2: 95 % (02/05/25 1846) Vital Signs (24h Range):  Temp:  [99.1 °F (37.3 °C)-99.2 °F (37.3 °C)] 99.2 °F (37.3 °C)  Pulse:  [74-76] 76  Resp:  [20] 20  SpO2:  [95 %-97 %] 95 %  BP: (109-154)/(66-82) 154/82     Weight: 64.4 kg (142 lb)  Body mass index is 26.83 kg/m².     Physical Exam  Vitals reviewed.   Constitutional:       Appearance: Normal appearance.   HENT:      Head: Normocephalic and atraumatic.      Nose: Nose normal.   Eyes:      Extraocular Movements: Extraocular movements intact.      Conjunctiva/sclera: Conjunctivae normal.   Neck:      Trachea: Trachea normal.   Cardiovascular:      Rate and Rhythm: Normal rate and regular rhythm.      Pulses: Normal pulses.      Heart sounds: Normal heart sounds.    Pulmonary:      Effort: Pulmonary effort is normal.      Breath sounds: Normal breath sounds and air entry.   Abdominal:      General: Bowel sounds are normal.      Palpations: Abdomen is soft.   Genitourinary:     Comments: Hameed in place with bloody urine  Musculoskeletal:         General: Normal range of motion.      Cervical back: Neck supple.   Skin:     General: Skin is warm and dry.   Neurological:      General: No focal deficit present.      Mental Status: She is alert. She is disoriented.      Comments: Grossly normal motor and sensory function without focal deficit appreciated.   Psychiatric:         Mood and Affect: Mood and affect normal.         Behavior: Behavior is cooperative.                Significant Labs: All pertinent labs within the past 24 hours have been reviewed.    Significant Imaging: I have reviewed all pertinent imaging results/findings within the past 24 hours.  Assessment/Plan:     * Emphysematous cystitis  Per Dr. Rodriguez.  Plan for OR tomorrow.  Cefepime      Acute blood loss anemia  Anemia is likely due to acute blood loss which was from hematuria . Most recent hemoglobin and hematocrit are listed below.  Recent Labs     02/05/25  1231   HGB 10.6*   HCT 32.1*     Plan  - Monitor serial CBC: Daily  - Transfuse PRBC if patient becomes hemodynamically unstable, symptomatic or H/H drops below 7/21.  - Patient has not received any PRBC transfusions to date  - Patient's anemia is currently stable  - follow    Nausea & vomiting  Zofran p.r.n.      Fatigue  Progressive mobility protocol      UTI (urinary tract infection)  Cefepime follow up cultures      Seizures  Keppra      Anemia  Anemia is likely due to acute blood loss which was from hematuria . Most recent hemoglobin and hematocrit are listed below.  Recent Labs     02/05/25  1231   HGB 10.6*   HCT 32.1*     Plan  - Monitor serial CBC: Daily  - Transfuse PRBC if patient becomes hemodynamically unstable, symptomatic or H/H drops  "below 7/21.  - Patient has not received any PRBC transfusions to date  - Patient's anemia is currently worsening. Will continue current treatment  - hold blood thinners    Other hydronephrosis  Per urology      Hyperglycemia due to diabetes mellitus  Sliding scale      Urinary retention  Hameed now in place      Cardiac pacemaker in situ  Stable      EDGAR (obstructive sleep apnea)  CPAP      AV block, Mobitz II  Pacemaker      Diabetes mellitus without complication  Patient's FSGs are uncontrolled due to hyperglycemia on current medication regimen.  Last A1c reviewed- No results found for: "LABA1C", "HGBA1C"  Most recent fingerstick glucose reviewed- No results for input(s): "POCTGLUCOSE" in the last 24 hours.  Current correctional scale  Low  Maintain anti-hyperglycemic dose as follows-   Antihyperglycemics (From admission, onward)      Start     Stop Route Frequency Ordered    02/05/25 1944  insulin aspart U-100 injection 0-5 Units         -- SubQ Before meals & nightly PRN 02/05/25 1845          Hold Oral hypoglycemics while patient is in the hospital.  A1c pending    Hyperlipidemia  Statin      Essential hypertension  Patient's blood pressure range in the last 24 hours was: BP  Min: 109/66  Max: 154/82.The patient's inpatient anti-hypertensive regimen is listed below:  Current Antihypertensives  amLODIPine tablet 10 mg, Daily, Oral  losartan-hydrochlorothiazide 50-12.5 mg per tablet 1 tablet, Daily, Oral  metoprolol succinate (TOPROL-XL) 24 hr tablet 25 mg, Daily, Oral  spironolactone tablet 25 mg, Daily, Oral    Plan  - BP is controlled, no changes needed to their regimen  - follow   Stop clonidine add Aldactone      VTE Risk Mitigation (From admission, onward)           Ordered     Reason for No Pharmacological VTE Prophylaxis  Once        Question:  Reasons:  Answer:  Active Bleeding    02/05/25 1845     IP VTE HIGH RISK PATIENT  Once         02/05/25 1845     Place sequential compression device  Until " discontinued         02/05/25 1845                                    José Miguel Kitchen DO  Department of Hospital Medicine  [facility] Emergency Department

## 2025-02-06 NOTE — PLAN OF CARE
Problem: Occupational Therapy  Goal: Occupational Therapy Goal  Description: STG:  Pt will perform grooming with setup  Pt will bathe with Tegan with setup at EOB  Pt will perform UE dressing with Tracy  Pt will perform LE dressing with Tegan  Pt will sit EOB x 10 min with SBA  Pt will transfer bed/chair/bsc with CGA with RW  Pt will perform standing task x 2 min with CGA with RW   Pt will tolerate 15 minutes of tx without fatigue      LT.Restore to max I with self care and mobility.      Outcome: Progressing

## 2025-02-06 NOTE — ASSESSMENT & PLAN NOTE
Per Dr. Rodriguez.  Plan for OR tomorrow.  Cefepime  OR today.  Gram-negative rods is in urine.  Continue cefepime

## 2025-02-06 NOTE — ASSESSMENT & PLAN NOTE
"Patient's FSGs are uncontrolled due to hyperglycemia on current medication regimen.  Last A1c reviewed- No results found for: "LABA1C", "HGBA1C"  Most recent fingerstick glucose reviewed- No results for input(s): "POCTGLUCOSE" in the last 24 hours.  Current correctional scale  Low  Maintain anti-hyperglycemic dose as follows-   Antihyperglycemics (From admission, onward)      Start     Stop Route Frequency Ordered    02/05/25 1944  insulin aspart U-100 injection 0-5 Units         -- SubQ Before meals & nightly PRN 02/05/25 1845          Hold Oral hypoglycemics while patient is in the hospital.  A1c pending  "

## 2025-02-06 NOTE — ASSESSMENT & PLAN NOTE
Anemia is likely due to acute blood loss which was from hematuria . Most recent hemoglobin and hematocrit are listed below.  Recent Labs     02/05/25  1231   HGB 10.6*   HCT 32.1*     Plan  - Monitor serial CBC: Daily  - Transfuse PRBC if patient becomes hemodynamically unstable, symptomatic or H/H drops below 7/21.  - Patient has not received any PRBC transfusions to date  - Patient's anemia is currently worsening. Will continue current treatment  - hold blood thinners

## 2025-02-06 NOTE — ASSESSMENT & PLAN NOTE
"Patient's FSGs are uncontrolled due to hyperglycemia on current medication regimen.  Last A1c reviewed-   Lab Results   Component Value Date    HGBA1C 7.5 (H) 02/05/2025     Most recent fingerstick glucose reviewed- No results for input(s): "POCTGLUCOSE" in the last 24 hours.  Current correctional scale  Low  Maintain anti-hyperglycemic dose as follows-   Antihyperglycemics (From admission, onward)    Start     Stop Route Frequency Ordered    02/05/25 1944  insulin aspart U-100 injection 0-5 Units         -- SubQ Before meals & nightly PRN 02/05/25 1845        Hold Oral hypoglycemics while patient is in the hospital.  A1c pending  "

## 2025-02-07 LAB
ANION GAP SERPL CALCULATED.3IONS-SCNC: 11 MMOL/L (ref 7–16)
BASOPHILS # BLD AUTO: 0.03 K/UL (ref 0–0.2)
BASOPHILS NFR BLD AUTO: 0.6 % (ref 0–1)
BUN SERPL-MCNC: 17 MG/DL (ref 10–20)
BUN/CREAT SERPL: 12 (ref 6–20)
CALCIUM SERPL-MCNC: 9.1 MG/DL (ref 8.4–10.2)
CHLORIDE SERPL-SCNC: 110 MMOL/L (ref 98–107)
CO2 SERPL-SCNC: 24 MMOL/L (ref 23–31)
CREAT SERPL-MCNC: 1.47 MG/DL (ref 0.55–1.02)
DIFFERENTIAL METHOD BLD: ABNORMAL
EGFR (NO RACE VARIABLE) (RUSH/TITUS): 36 ML/MIN/1.73M2
EOSINOPHIL # BLD AUTO: 0.26 K/UL (ref 0–0.5)
EOSINOPHIL NFR BLD AUTO: 4.8 % (ref 1–4)
ERYTHROCYTE [DISTWIDTH] IN BLOOD BY AUTOMATED COUNT: 12.3 % (ref 11.5–14.5)
GLUCOSE SERPL-MCNC: 145 MG/DL (ref 70–105)
GLUCOSE SERPL-MCNC: 186 MG/DL (ref 82–115)
GLUCOSE SERPL-MCNC: 199 MG/DL (ref 70–105)
GLUCOSE SERPL-MCNC: 202 MG/DL (ref 70–105)
GLUCOSE SERPL-MCNC: 226 MG/DL (ref 70–105)
HCT VFR BLD AUTO: 29.7 % (ref 38–47)
HGB BLD-MCNC: 9.7 G/DL (ref 12–16)
IMM GRANULOCYTES # BLD AUTO: 0.01 K/UL (ref 0–0.04)
IMM GRANULOCYTES NFR BLD: 0.2 % (ref 0–0.4)
LYMPHOCYTES # BLD AUTO: 1.67 K/UL (ref 1–4.8)
LYMPHOCYTES NFR BLD AUTO: 30.8 % (ref 27–41)
MAGNESIUM SERPL-MCNC: 1.6 MG/DL (ref 1.6–2.6)
MCH RBC QN AUTO: 29.5 PG (ref 27–31)
MCHC RBC AUTO-ENTMCNC: 32.7 G/DL (ref 32–36)
MCV RBC AUTO: 90.3 FL (ref 80–96)
MONOCYTES # BLD AUTO: 0.39 K/UL (ref 0–0.8)
MONOCYTES NFR BLD AUTO: 7.2 % (ref 2–6)
MPC BLD CALC-MCNC: 9.8 FL (ref 9.4–12.4)
NEUTROPHILS # BLD AUTO: 3.06 K/UL (ref 1.8–7.7)
NEUTROPHILS NFR BLD AUTO: 56.4 % (ref 53–65)
NRBC # BLD AUTO: 0 X10E3/UL
NRBC, AUTO (.00): 0 %
PHOSPHATE SERPL-MCNC: 3.6 MG/DL (ref 2.3–4.7)
PLATELET # BLD AUTO: 200 K/UL (ref 150–400)
POTASSIUM SERPL-SCNC: 3.7 MMOL/L (ref 3.5–5.1)
RBC # BLD AUTO: 3.29 M/UL (ref 4.2–5.4)
SODIUM SERPL-SCNC: 141 MMOL/L (ref 136–145)
UA COMPLETE W REFLEX CULTURE PNL UR: ABNORMAL
WBC # BLD AUTO: 5.42 K/UL (ref 4.5–11)

## 2025-02-07 PROCEDURE — 63600175 PHARM REV CODE 636 W HCPCS: Performed by: NURSE PRACTITIONER

## 2025-02-07 PROCEDURE — 97116 GAIT TRAINING THERAPY: CPT

## 2025-02-07 PROCEDURE — 25000003 PHARM REV CODE 250

## 2025-02-07 PROCEDURE — 25000003 PHARM REV CODE 250: Performed by: STUDENT IN AN ORGANIZED HEALTH CARE EDUCATION/TRAINING PROGRAM

## 2025-02-07 PROCEDURE — 27000221 HC OXYGEN, UP TO 24 HOURS

## 2025-02-07 PROCEDURE — 11000001 HC ACUTE MED/SURG PRIVATE ROOM

## 2025-02-07 PROCEDURE — 99232 SBSQ HOSP IP/OBS MODERATE 35: CPT | Mod: ,,,

## 2025-02-07 PROCEDURE — 99900035 HC TECH TIME PER 15 MIN (STAT)

## 2025-02-07 PROCEDURE — 63600175 PHARM REV CODE 636 W HCPCS: Performed by: STUDENT IN AN ORGANIZED HEALTH CARE EDUCATION/TRAINING PROGRAM

## 2025-02-07 PROCEDURE — 82962 GLUCOSE BLOOD TEST: CPT

## 2025-02-07 PROCEDURE — 83735 ASSAY OF MAGNESIUM: CPT | Performed by: STUDENT IN AN ORGANIZED HEALTH CARE EDUCATION/TRAINING PROGRAM

## 2025-02-07 PROCEDURE — 85025 COMPLETE CBC W/AUTO DIFF WBC: CPT | Performed by: STUDENT IN AN ORGANIZED HEALTH CARE EDUCATION/TRAINING PROGRAM

## 2025-02-07 PROCEDURE — 80048 BASIC METABOLIC PNL TOTAL CA: CPT | Performed by: STUDENT IN AN ORGANIZED HEALTH CARE EDUCATION/TRAINING PROGRAM

## 2025-02-07 PROCEDURE — 97530 THERAPEUTIC ACTIVITIES: CPT

## 2025-02-07 PROCEDURE — 36415 COLL VENOUS BLD VENIPUNCTURE: CPT | Performed by: STUDENT IN AN ORGANIZED HEALTH CARE EDUCATION/TRAINING PROGRAM

## 2025-02-07 PROCEDURE — 84100 ASSAY OF PHOSPHORUS: CPT | Performed by: STUDENT IN AN ORGANIZED HEALTH CARE EDUCATION/TRAINING PROGRAM

## 2025-02-07 PROCEDURE — 94760 N-INVAS EAR/PLS OXIMETRY 1: CPT

## 2025-02-07 RX ORDER — CIPROFLOXACIN 2 MG/ML
400 INJECTION, SOLUTION INTRAVENOUS
Status: DISCONTINUED | OUTPATIENT
Start: 2025-02-07 | End: 2025-02-10 | Stop reason: HOSPADM

## 2025-02-07 RX ORDER — MUPIROCIN 20 MG/G
OINTMENT TOPICAL 2 TIMES DAILY
Status: DISCONTINUED | OUTPATIENT
Start: 2025-02-07 | End: 2025-02-10 | Stop reason: HOSPADM

## 2025-02-07 RX ADMIN — OXYCODONE 5 MG: 5 TABLET ORAL at 01:02

## 2025-02-07 RX ADMIN — INSULIN ASPART 2 UNITS: 100 INJECTION, SOLUTION INTRAVENOUS; SUBCUTANEOUS at 05:02

## 2025-02-07 RX ADMIN — CIPROFLOXACIN 400 MG: 2 INJECTION, SOLUTION INTRAVENOUS at 11:02

## 2025-02-07 RX ADMIN — SERTRALINE HYDROCHLORIDE 100 MG: 50 TABLET ORAL at 09:02

## 2025-02-07 RX ADMIN — MUPIROCIN: 20 OINTMENT TOPICAL at 09:02

## 2025-02-07 RX ADMIN — LEVOTHYROXINE SODIUM 100 MCG: 0.03 TABLET ORAL at 06:02

## 2025-02-07 RX ADMIN — LEVETIRACETAM 500 MG: 500 TABLET, FILM COATED ORAL at 09:02

## 2025-02-07 RX ADMIN — CEFEPIME 1 G: 1 INJECTION, POWDER, FOR SOLUTION INTRAMUSCULAR; INTRAVENOUS at 01:02

## 2025-02-07 RX ADMIN — CEFEPIME 1 G: 1 INJECTION, POWDER, FOR SOLUTION INTRAMUSCULAR; INTRAVENOUS at 09:02

## 2025-02-07 RX ADMIN — OXYBUTYNIN CHLORIDE 5 MG: 5 TABLET ORAL at 09:02

## 2025-02-07 RX ADMIN — ATORVASTATIN CALCIUM 40 MG: 40 TABLET, FILM COATED ORAL at 09:02

## 2025-02-07 RX ADMIN — MUPIROCIN: 20 OINTMENT TOPICAL at 11:02

## 2025-02-07 RX ADMIN — PANTOPRAZOLE SODIUM 40 MG: 40 TABLET, DELAYED RELEASE ORAL at 09:02

## 2025-02-07 RX ADMIN — SPIRONOLACTONE 25 MG: 25 TABLET ORAL at 09:02

## 2025-02-07 NOTE — SUBJECTIVE & OBJECTIVE
Interval History:  MAG 3 lasix renal performed. Evidence of delay on the right was visualized. Patient resting comfortably in bed with son and daughter-in-law at bedside.  Indwelling Hameed catheter intact and draining yellow urine.      EXAMINATION:  NM KIDNEY W FLOW AND FUNCTION W PHARMACOLOGICAL     CLINICAL HISTORY:  Hydronephrosis;Right hydronephrosis.  Please evaluate split function and T1/2 bilaterally. Hameed in place.;   79-year-old female with kidney stones     TECHNIQUE:  Following the IV administration of 4.8 mCi of Tc-99m-MAG3, sequential dynamic images of the kidneys were obtained in the posterior projection for 30 minutes.     40 mg of IV Lasix was administered at 10 minutes and imaging performed for 20 minutes     Whole kidney time activity curves were analyzed.     COMPARISON:  CT 12/07/24     FINDINGS:  There is asymmetric homogenous distribution of the radiopharmaceutical within the renal cortex of each kidney, and the differential function is 65 % for the left and 35 % for the right kidney.     Left:     On the left, the time to peak cortical activity is at 9 minutes (normal is 5 min. or less) with abnormal 30 minute to peak ratio of 0.47 (normal is less than 0.3).     Prior to lasix images demonstrate collecting system did not empty     After Lasix there is  emptying  from the left kidney with a T1/2 of 6 minutes     Right:     On the smaller right, the time to peak cortical activity is at13 minutes (normal is 5 min. or less) with abnormal 30 minute to peak ratio of 0.8 (normal is less than 0.3).     Prior to lasix images demonstrate the collecting system did not  empty     After Lasix no  emptying from the right kidney with mild to moderate retention at UPJ with T1/2 on right 17  minutes     Impression:     1.  The left kidney with delayed   flow, uptake/function and excretion with no obstruction     2.  The right kidney  with delayed flow, uptake/function and no  excretion with obstruction      3.  The differential renal function is 65 % on the left and 35 % on the right.        Electronically signed by:Chen Helton  Date:                                            02/06/2025  Time:                                           14:41        Review of Systems   Constitutional:  Positive for activity change. Negative for diaphoresis.   HENT:  Negative for voice change.    Eyes:  Negative for discharge.   Respiratory:  Negative for cough and shortness of breath.    Cardiovascular:  Negative for chest pain.   Genitourinary:  Positive for hematuria.   Skin:  Negative for color change.   Neurological:  Negative for tremors.   Psychiatric/Behavioral:  Negative for agitation.      Objective:     Temp:  [98.6 °F (37 °C)-99.1 °F (37.3 °C)] 98.8 °F (37.1 °C)  Pulse:  [63-78] 64  Resp:  [18-20] 20  SpO2:  [91 %-96 %] 96 %  BP: (108-143)/(57-88) 108/57     Body mass index is 26.83 kg/m².    Date 02/07/25 0700 - 02/08/25 0659   Shift 4692-3180 1838-6785 0964-9957 24 Hour Total   INTAKE   I.V.(mL/kg)  1000(15.5)  1000(15.5)   Shift Total(mL/kg)  1000(15.5)  1000(15.5)   OUTPUT   Shift Total(mL/kg)       Weight (kg) 64.4 64.4 64.4 64.4          Drains       Drain  Duration                  Urethral Catheter 02/05/25 1426 1 day                     Physical Exam  Vitals and nursing note reviewed.   Constitutional:       General: She is not in acute distress.  HENT:      Head: Normocephalic.   Eyes:      Conjunctiva/sclera: Conjunctivae normal.   Cardiovascular:      Rate and Rhythm: Bradycardia present.   Pulmonary:      Effort: Pulmonary effort is normal. No respiratory distress.   Genitourinary:     Comments: Indwelling Hameed catheter attached to 2 L Hameed bag intact and draining yellow urine  Musculoskeletal:      Cervical back: Normal range of motion.   Skin:     General: Skin is warm and dry.   Neurological:      General: No focal deficit present.      Mental Status: She is alert.   Psychiatric:         Mood and  Affect: Mood normal.         Behavior: Behavior normal.           Significant Labs:    BMP:  Recent Labs   Lab 02/05/25  1231 02/06/25  0434 02/07/25  0542    140 141   K 4.1 3.8 3.7   * 110* 110*   CO2 22* 19* 24   BUN 18 12 17   CREATININE 1.62* 1.08* 1.47*   CALCIUM 8.9 9.1 9.1       CBC:   Recent Labs   Lab 02/05/25  1231 02/06/25  0434 02/07/25  0542   WBC 9.50 6.92 5.42   HGB 10.6* 9.8* 9.7*   HCT 32.1* 30.3* 29.7*    181 200       All pertinent labs results from the past 24 hours have been reviewed.    Significant Imaging:  All pertinent imaging results/findings from the past 24 hours have been reviewed.        Impression:  Emphysematous cystitis  Right hydronephrosis with impaired function and delay  Hyperglycemia  Urinary retention  Multidrug resistant E coli    Plan:  The urinary tract infection demonstrates multidrug resistant E coli resistance to cefepime.  Switched to ciprofloxacin based on urine culture and continue Hameed catheter.  There is delayed excretion on the right with evidence of impaired function.  She may require intervention but at this time plan to continue culture directed antibiotics and Urology will continue to follow.

## 2025-02-07 NOTE — ASSESSMENT & PLAN NOTE
DAVID is likely due to acute tubular necrosis caused by infection . Baseline creatinine is  1.0 . Most recent creatinine and eGFR are listed below.  Recent Labs     02/05/25  1231 02/06/25  0434 02/07/25  0542   CREATININE 1.62* 1.08* 1.47*   EGFRNORACEVR 32* 52* 36*        Plan  - DAVID is improving  - Avoid nephrotoxins and renally dose meds for GFR listed above  - Monitor urine output, serial BMP, and adjust therapy as needed  - follow    2/7 urine cultures reviewed, started cipro per culture, will need IV over the next few days then evaluate for po and discharge

## 2025-02-07 NOTE — ASSESSMENT & PLAN NOTE
Anemia is likely due to acute blood loss which was from hematuria . Most recent hemoglobin and hematocrit are listed below.  Recent Labs     02/05/25  1231 02/06/25  0434 02/07/25  0542   HGB 10.6* 9.8* 9.7*   HCT 32.1* 30.3* 29.7*       Plan  - Monitor serial CBC: Daily  - Transfuse PRBC if patient becomes hemodynamically unstable, symptomatic or H/H drops below 7/21.  - Patient has not received any PRBC transfusions to date  - Patient's anemia is currently worsening. Will continue current treatment  - hold blood thinners  Follow

## 2025-02-07 NOTE — PLAN OF CARE
Cm will continue to follow for dc planning. Dc plan is home with daughter and [facility].

## 2025-02-07 NOTE — SUBJECTIVE & OBJECTIVE
Interval History:      Review of Systems  Objective:     Vital Signs (Most Recent):  Temp: 97.5 °F (36.4 °C) (02/07/25 1122)  Pulse: 83 (02/07/25 1122)  Resp: 16 (02/07/25 1335)  BP: 139/66 (02/07/25 1122)  SpO2: 98 % (02/07/25 1122) Vital Signs (24h Range):  Temp:  [97.5 °F (36.4 °C)-99.1 °F (37.3 °C)] 97.5 °F (36.4 °C)  Pulse:  [64-83] 83  Resp:  [16-20] 16  SpO2:  [91 %-98 %] 98 %  BP: (108-139)/(57-77) 139/66     Weight: 64.4 kg (142 lb)  Body mass index is 26.83 kg/m².    Intake/Output Summary (Last 24 hours) at 2/7/2025 1542  Last data filed at 2/7/2025 0620  Gross per 24 hour   Intake --   Output 2700 ml   Net -2700 ml         Physical Exam  Vitals reviewed.   Constitutional:       Appearance: Normal appearance.   HENT:      Head: Normocephalic and atraumatic.      Nose: Nose normal.   Eyes:      Extraocular Movements: Extraocular movements intact.      Conjunctiva/sclera: Conjunctivae normal.   Neck:      Trachea: Trachea normal.   Cardiovascular:      Rate and Rhythm: Normal rate and regular rhythm.      Pulses: Normal pulses.      Heart sounds: Normal heart sounds.   Pulmonary:      Effort: Pulmonary effort is normal.      Breath sounds: Normal breath sounds and air entry.   Abdominal:      General: Bowel sounds are normal.      Palpations: Abdomen is soft.   Genitourinary:     Comments: Hameed in place with bloody urine  Musculoskeletal:         General: Normal range of motion.      Cervical back: Neck supple.   Skin:     General: Skin is warm and dry.   Neurological:      General: No focal deficit present.      Mental Status: She is alert. Mental status is at baseline.      Comments: Grossly normal motor and sensory function without focal deficit appreciated.   Psychiatric:         Mood and Affect: Mood and affect normal.         Behavior: Behavior is cooperative.             Significant Labs: All pertinent labs within the past 24 hours have been reviewed.    Significant Imaging: I have reviewed all  pertinent imaging results/findings within the past 24 hours.

## 2025-02-07 NOTE — PROGRESS NOTES
[facility]  Urology  Progress Note    Patient Name: Дмитрий Collazo  MRN: [id number]  Admission Date: 2/5/2025  Hospital Length of Stay: 2 days  Code Status: Full Code   Attending Provider: Ronit Kaur MD   Primary Care Physician: Jennifer Zurita MD    Subjective:       Interval History:  MAG 3 lasix renal performed. Evidence of delay on the right was visualized. Patient resting comfortably in bed with son and daughter-in-law at bedside.  Indwelling Hameed catheter intact and draining yellow urine.      EXAMINATION:  NM KIDNEY W FLOW AND FUNCTION W PHARMACOLOGICAL     CLINICAL HISTORY:  Hydronephrosis;Right hydronephrosis.  Please evaluate split function and T1/2 bilaterally. Hameed in place.;   79-year-old female with kidney stones     TECHNIQUE:  Following the IV administration of 4.8 mCi of Tc-99m-MAG3, sequential dynamic images of the kidneys were obtained in the posterior projection for 30 minutes.     40 mg of IV Lasix was administered at 10 minutes and imaging performed for 20 minutes     Whole kidney time activity curves were analyzed.     COMPARISON:  CT 12/07/24     FINDINGS:  There is asymmetric homogenous distribution of the radiopharmaceutical within the renal cortex of each kidney, and the differential function is 65 % for the left and 35 % for the right kidney.     Left:     On the left, the time to peak cortical activity is at 9 minutes (normal is 5 min. or less) with abnormal 30 minute to peak ratio of 0.47 (normal is less than 0.3).     Prior to lasix images demonstrate collecting system did not empty     After Lasix there is  emptying  from the left kidney with a T1/2 of 6 minutes     Right:     On the smaller right, the time to peak cortical activity is at13 minutes (normal is 5 min. or less) with abnormal 30 minute to peak ratio of 0.8 (normal is less than 0.3).     Prior to lasix images demonstrate the collecting system did not  empty     After Lasix no  emptying from the  right kidney with mild to moderate retention at UPJ with T1/2 on right 17  minutes     Impression:     1.  The left kidney with delayed   flow, uptake/function and excretion with no obstruction     2.  The right kidney  with delayed flow, uptake/function and no  excretion with obstruction     3.  The differential renal function is 65 % on the left and 35 % on the right.        Electronically signed by:Chen Helton  Date:                                            02/06/2025  Time:                                           14:41        Review of Systems   Constitutional:  Positive for activity change. Negative for diaphoresis.   HENT:  Negative for voice change.    Eyes:  Negative for discharge.   Respiratory:  Negative for cough and shortness of breath.    Cardiovascular:  Negative for chest pain.   Genitourinary:  Positive for hematuria.   Skin:  Negative for color change.   Neurological:  Negative for tremors.   Psychiatric/Behavioral:  Negative for agitation.      Objective:     Temp:  [98.6 °F (37 °C)-99.1 °F (37.3 °C)] 98.8 °F (37.1 °C)  Pulse:  [63-78] 64  Resp:  [18-20] 20  SpO2:  [91 %-96 %] 96 %  BP: (108-143)/(57-88) 108/57     Body mass index is 26.83 kg/m².    Date 02/07/25 0700 - 02/08/25 0659   Shift 7177-4626 0612-1002 1844-9603 24 Hour Total   INTAKE   I.V.(mL/kg)  1000(15.5)  1000(15.5)   Shift Total(mL/kg)  1000(15.5)  1000(15.5)   OUTPUT   Shift Total(mL/kg)       Weight (kg) 64.4 64.4 64.4 64.4          Drains       Drain  Duration                  Urethral Catheter 02/05/25 1426 1 day                     Physical Exam  Vitals and nursing note reviewed.   Constitutional:       General: She is not in acute distress.  HENT:      Head: Normocephalic.   Eyes:      Conjunctiva/sclera: Conjunctivae normal.   Cardiovascular:      Rate and Rhythm: Bradycardia present.   Pulmonary:      Effort: Pulmonary effort is normal. No respiratory distress.   Genitourinary:     Comments: Indwelling Hameed  catheter attached to 2 L Hameed bag intact and draining yellow urine  Musculoskeletal:      Cervical back: Normal range of motion.   Skin:     General: Skin is warm and dry.   Neurological:      General: No focal deficit present.      Mental Status: She is alert.   Psychiatric:         Mood and Affect: Mood normal.         Behavior: Behavior normal.           Significant Labs:    BMP:  Recent Labs   Lab 02/05/25  1231 02/06/25  0434 02/07/25  0542    140 141   K 4.1 3.8 3.7   * 110* 110*   CO2 22* 19* 24   BUN 18 12 17   CREATININE 1.62* 1.08* 1.47*   CALCIUM 8.9 9.1 9.1       CBC:   Recent Labs   Lab 02/05/25  1231 02/06/25  0434 02/07/25  0542   WBC 9.50 6.92 5.42   HGB 10.6* 9.8* 9.7*   HCT 32.1* 30.3* 29.7*    181 200       All pertinent labs results from the past 24 hours have been reviewed.    Significant Imaging:  All pertinent imaging results/findings from the past 24 hours have been reviewed.        Impression:  Emphysematous cystitis  Right hydronephrosis with impaired function and delay  Hyperglycemia  Urinary retention  Multidrug resistant E coli    Plan:  The urinary tract infection demonstrates multidrug resistant E coli resistance to cefepime.  Switched to ciprofloxacin based on urine culture and continue Hameed catheter.  There is delayed excretion on the right with evidence of impaired function.  She may require intervention but at this time plan to continue culture directed antibiotics and Urology will continue to follow.           Assessment/Plan:     * Emphysematous cystitis  Maintain Hameed catheter     UTI (urinary tract infection)  Continue antibiotics    Other hydronephrosis       Hyperglycemia due to diabetes mellitus       Urinary retention  Maintain Hameed catheter         Plan:  The urinary tract infection demonstrates multidrug resistant E coli resistance to cefepime.  Switched to ciprofloxacin based on urine culture and continue Hameed catheter.  There is delayed excretion  on the right with evidence of impaired function.  She may require intervention but at this time plan to continue culture directed antibiotics and Urology will continue to follow.     VTE Risk Mitigation (From admission, onward)           Ordered     Reason for No Pharmacological VTE Prophylaxis  Once        Question:  Reasons:  Answer:  Active Bleeding    02/05/25 1845     IP VTE HIGH RISK PATIENT  Once         02/05/25 1845     Place sequential compression device  Until discontinued         02/05/25 1845                    Ricci Valdez NP  Urology  [facility]

## 2025-02-07 NOTE — PT/OT/SLP PROGRESS
Physical Therapy Treatment    Patient Name:  Дмитрий Collazo   MRN:  [id number]    Recommendations:     Discharge Recommendations: Moderate Intensity Therapy (to low intensity)  Discharge Equipment Recommendations: to be determined by next level of care  Barriers to discharge:  ongoing treatment    Assessment:     Дмитрий Collazo is a 79 y.o. female admitted with a medical diagnosis of Emphysematous cystitis.  She presents with the following impairments/functional limitations: weakness, impaired endurance, impaired functional mobility     Patient drowsy today but able to participate with PT. Was able to ambulate 20 ft with RW and CGA    Rehab Prognosis: Good; patient would benefit from acute skilled PT services to address these deficits and reach maximum level of function.    Recent Surgery: * No surgery found *      Plan:     During this hospitalization, patient to be seen 5 x/week to address the identified rehab impairments via gait training, therapeutic activities, therapeutic exercises, neuromuscular re-education and progress toward the following goals:    Plan of Care Expires:  03/06/25    Subjective     Chief Complaint: drowsy  Patient/Family Comments/goals: agreeable  Pain/Comfort:  Pain Rating 1: 0/10      Objective:     Communicated with nurse prior to session.  Patient found HOB elevated with dove catheter upon PT entry to room.     General Precautions: Standard, fall  Orthopedic Precautions:    Braces:    Respiratory Status: Nasal cannula, flow 2 L/min     Functional Mobility:  Bed Mobility:     Supine to Sit: minimum assistance  Sit to Supine: minimum assistance  Transfers:     Sit to Stand:  minimum assistance with rolling walker  Gait: 20 ft with RW and CGA, narrow kendrick  Balance: fair      AM-PAC 6 CLICK MOBILITY  Turning over in bed (including adjusting bedclothes, sheets and blankets)?: 3  Sitting down on and standing up from a chair with arms (e.g., wheelchair, bedside commode, etc.): 3  Moving from  lying on back to sitting on the side of the bed?: 3  Moving to and from a bed to a chair (including a wheelchair)?: 3  Need to walk in hospital room?: 3  Climbing 3-5 steps with a railing?: 2  Basic Mobility Total Score: 17       Treatment & Education:  Mobility as noted    Patient left HOB elevated with all lines intact, call button in reach, bed alarm on, and nurse notified..    GOALS:   Multidisciplinary Problems       Physical Therapy Goals          Problem: Physical Therapy    Goal Priority Disciplines Outcome Interventions   Physical Therapy Goal     PT, PT/OT Progressing    Description: Short term goals:  . Supine to sit with Contact Guard Assistance  . Sit to supine with Contact Guard Assistance  . Sit to stand transfer with Contact Guard Assistance  . Gait  x 50 feet with Contact Guard Assistance using Rolling Walker.     Long term goals:  Patient will regain full independent functional mobility with lowest level of assistive device to return to desired living arrangement and prior ADL's.                          DME Justifications:      Time Tracking:     PT Received On: 02/07/25  PT Start Time: 1405     PT Stop Time: 1422  PT Total Time (min): 17 min     Billable Minutes: Gait Training 17    Treatment Type: Treatment  PT/PTA: PT     Number of PTA visits since last PT visit: 0     02/07/2025

## 2025-02-07 NOTE — PROGRESS NOTES
[facility]  [facility] Medicine  Progress Note    Patient Name: Дмитрий Collazo  MRN: [id number]  Patient Class: IP- Inpatient   Admission Date: 2/5/2025  Length of Stay: 2 days  Attending Physician: Ronit Kaur MD  Primary Care Provider: Jennifer Zurita MD        Subjective     Principal Problem:Emphysematous cystitis        HPI:  79-year-old white female with a past medical history of Mobitz type 2 AV block status post pacemaker, EDGAR, hypertension, hyperlipidemia, diabetes, seizures, anemia who presents to the ED for hematuria.  She was in his show by General for quite awhile awaiting transfer.  She finally became tired of waiting and self discharged then presented to [facility] for urologic evaluation.  Dr. Rodriguez in his seen.  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  No stone evident.  She has had some dysuria.  Denies any fevers.  Has chronic confusion.  Does endorse some vomiting as well.  Has otherwise been in her normal state of health.  Review of systems otherwise negative    Overview/Hospital Course:  2/6 Gnrs in the urine.  Cystoscopy today.  We will with hematuria  2/7 urine cultures reviewed, started cipro per culture, will need IV over the next few days then evaluate for po and discharge    Interval History:      Review of Systems  Objective:     Vital Signs (Most Recent):  Temp: 97.5 °F (36.4 °C) (02/07/25 1122)  Pulse: 83 (02/07/25 1122)  Resp: 16 (02/07/25 1335)  BP: 139/66 (02/07/25 1122)  SpO2: 98 % (02/07/25 1122) Vital Signs (24h Range):  Temp:  [97.5 °F (36.4 °C)-99.1 °F (37.3 °C)] 97.5 °F (36.4 °C)  Pulse:  [64-83] 83  Resp:  [16-20] 16  SpO2:  [91 %-98 %] 98 %  BP: (108-139)/(57-77) 139/66     Weight: 64.4 kg (142 lb)  Body mass index is 26.83 kg/m².    Intake/Output Summary (Last 24 hours) at 2/7/2025 1542  Last data filed at 2/7/2025 0620  Gross per 24 hour   Intake --   Output 2700 ml   Net -2700 ml         Physical  Exam  Vitals reviewed.   Constitutional:       Appearance: Normal appearance.   HENT:      Head: Normocephalic and atraumatic.      Nose: Nose normal.   Eyes:      Extraocular Movements: Extraocular movements intact.      Conjunctiva/sclera: Conjunctivae normal.   Neck:      Trachea: Trachea normal.   Cardiovascular:      Rate and Rhythm: Normal rate and regular rhythm.      Pulses: Normal pulses.      Heart sounds: Normal heart sounds.   Pulmonary:      Effort: Pulmonary effort is normal.      Breath sounds: Normal breath sounds and air entry.   Abdominal:      General: Bowel sounds are normal.      Palpations: Abdomen is soft.   Genitourinary:     Comments: Hameed in place with bloody urine  Musculoskeletal:         General: Normal range of motion.      Cervical back: Neck supple.   Skin:     General: Skin is warm and dry.   Neurological:      General: No focal deficit present.      Mental Status: She is alert. Mental status is at baseline.      Comments: Grossly normal motor and sensory function without focal deficit appreciated.   Psychiatric:         Mood and Affect: Mood and affect normal.         Behavior: Behavior is cooperative.             Significant Labs: All pertinent labs within the past 24 hours have been reviewed.    Significant Imaging: I have reviewed all pertinent imaging results/findings within the past 24 hours.    Assessment and Plan     * Emphysematous cystitis  Per Dr. Rodriguez.  Plan for OR tomorrow.  Cefepime  OR today.  Gram-negative rods is in urine.  Continue cefepime    DAVID (acute kidney injury)  DAVID is likely due to acute tubular necrosis caused by infection . Baseline creatinine is  1.0 . Most recent creatinine and eGFR are listed below.  Recent Labs     02/05/25  1231 02/06/25  0434 02/07/25  0542   CREATININE 1.62* 1.08* 1.47*   EGFRNORACEVR 32* 52* 36*        Plan  - DAVID is improving  - Avoid nephrotoxins and renally dose meds for GFR listed above  - Monitor urine output, serial BMP,  and adjust therapy as needed  - follow    2/7 urine cultures reviewed, started cipro per culture, will need IV over the next few days then evaluate for po and discharge    Acute blood loss anemia  Anemia is likely due to acute blood loss which was from hematuria . Most recent hemoglobin and hematocrit are listed below.  Recent Labs     02/05/25  1231 02/06/25  0434   HGB 10.6* 9.8*   HCT 32.1* 30.3*       Plan  - Monitor serial CBC: Daily  - Transfuse PRBC if patient becomes hemodynamically unstable, symptomatic or H/H drops below 7/21.  - Patient has not received any PRBC transfusions to date  - Patient's anemia is currently stable  - follow  Follow hemoglobin    Nausea & vomiting  Zofran p.r.n.      Fatigue  Progressive mobility protocol      UTI (urinary tract infection)  Cefepime follow up cultures  Gram-negative rods    Seizures  Keppra      Anemia  Anemia is likely due to acute blood loss which was from hematuria . Most recent hemoglobin and hematocrit are listed below.  Recent Labs     02/05/25  1231 02/06/25  0434 02/07/25  0542   HGB 10.6* 9.8* 9.7*   HCT 32.1* 30.3* 29.7*       Plan  - Monitor serial CBC: Daily  - Transfuse PRBC if patient becomes hemodynamically unstable, symptomatic or H/H drops below 7/21.  - Patient has not received any PRBC transfusions to date  - Patient's anemia is currently worsening. Will continue current treatment  - hold blood thinners  Follow    Other hydronephrosis  Per urology      Hyperglycemia due to diabetes mellitus  Sliding scale      Urinary retention  Hameed now in place  Still with hematuria    Cardiac pacemaker in situ  Stable      EDGAR (obstructive sleep apnea)  CPAP      AV block, Mobitz II  Pacemaker      Diabetes mellitus without complication  Patient's FSGs are uncontrolled due to hyperglycemia on current medication regimen.  Last A1c reviewed-   Lab Results   Component Value Date    HGBA1C 7.5 (H) 02/05/2025     Most recent fingerstick glucose reviewed- No  "results for input(s): "POCTGLUCOSE" in the last 24 hours.  Current correctional scale  Low  Maintain anti-hyperglycemic dose as follows-   Antihyperglycemics (From admission, onward)      Start     Stop Route Frequency Ordered    02/05/25 1944  insulin aspart U-100 injection 0-5 Units         -- SubQ Before meals & nightly PRN 02/05/25 1845          Hold Oral hypoglycemics while patient is in the hospital.  A1c pending    Hyperlipidemia  Statin      Essential hypertension  Patient's blood pressure range in the last 24 hours was: BP  Min: 107/74  Max: 183/80.The patient's inpatient anti-hypertensive regimen is listed below:  Current Antihypertensives  amLODIPine tablet 10 mg, Daily, Oral  metoprolol succinate (TOPROL-XL) 24 hr tablet 25 mg, Daily, Oral  spironolactone tablet 25 mg, Daily, Oral  losartan tablet 50 mg, Daily, Oral  hydroCHLOROthiazide tablet 12.5 mg, Daily, Oral  , Daily, Oral    Plan  - BP is controlled, no changes needed to their regimen  - follow   Stop clonidine add Aldactone      VTE Risk Mitigation (From admission, onward)           Ordered     Reason for No Pharmacological VTE Prophylaxis  Once        Question:  Reasons:  Answer:  Active Bleeding    02/05/25 1845     IP VTE HIGH RISK PATIENT  Once         02/05/25 1845     Place sequential compression device  Until discontinued         02/05/25 1845                    Discharge Planning   JUNA DIEGO: 2/8/2025     Code Status: Full Code   Medical Readiness for Discharge Date:   Discharge Plan A: Home with family, Home Health                Please place Justification for DME        Ronit Kaur MD  Department of Hospital Medicine   [facility]    "

## 2025-02-07 NOTE — PLAN OF CARE
Problem: Adult Inpatient Plan of Care  Goal: Plan of Care Review  Outcome: Progressing  Goal: Patient-Specific Goal (Individualized)  Outcome: Progressing  Goal: Absence of Hospital-Acquired Illness or Injury  Outcome: Progressing  Goal: Optimal Comfort and Wellbeing  Outcome: Progressing  Goal: Readiness for Transition of Care  Outcome: Progressing     Problem: Infection  Goal: Absence of Infection Signs and Symptoms  Outcome: Progressing     Problem: Skin Injury Risk Increased  Goal: Skin Health and Integrity  Outcome: Progressing     Problem: Fall Injury Risk  Goal: Absence of Fall and Fall-Related Injury  Outcome: Progressing     Problem: Urinary Elimination Management  Goal: Effective Urinary Elimination/Continence  Outcome: Progressing     Problem: Diabetes Comorbidity  Goal: Blood Glucose Level Within Targeted Range  Outcome: Progressing     Problem: Acute Kidney Injury/Impairment  Goal: Fluid and Electrolyte Balance  Outcome: Progressing  Goal: Improved Oral Intake  Outcome: Progressing  Goal: Effective Renal Function  Outcome: Progressing      Bill For Surgical Tray: no

## 2025-02-07 NOTE — PT/OT/SLP PROGRESS
Occupational Therapy   Treatment    Name: Дмитрий Collazo  MRN: [id number]  Admitting Diagnosis:  Emphysematous cystitis       Recommendations:     Discharge Recommendations: Low Intensity Therapy  Discharge Equipment Recommendations:  to be determined by next level of care  Barriers to discharge:  None    Assessment:     Дмитрий Collazo is a 79 y.o. female with a medical diagnosis of Emphysematous cystitis.  She presents with weakness and decline in ADLs. Performance deficits affecting function are weakness, impaired endurance, impaired self care skills, impaired functional mobility, gait instability, impaired balance.     Rehab Prognosis:  Good; patient would benefit from acute skilled OT services to address these deficits and reach maximum level of function.       Plan:     Patient to be seen 5 x/week to address the above listed problems via self-care/home management, therapeutic activities, therapeutic exercises  Plan of Care Expires: 03/06/25  Plan of Care Reviewed with: patient    Subjective     Chief Complaint: weakness  Patient/Family Comments/goals: pt agreeable to OT tx  Pain/Comfort:  Pain Rating 1: 0/10    Objective:     Communicated with: NIKOLAY Baptiste prior to session.  Patient found HOB elevated with peripheral IV, dove catheter upon OT entry to room.    General Precautions: Standard, fall    Orthopedic Precautions:N/A  Braces:    Respiratory Status: Room air     Occupational Performance:     Bed Mobility:    Patient completed Supine to Sit with minimum assistance  Patient completed Sit to Supine with minimum assistance     Functional Mobility/Transfers:  Not performed    Activities of Daily Living:  Not performed      [facility] 6 Click ADL:      Treatment & Education:  Pt performed EOB sitting x 8 minutes in order for RN to administer medication. Pt lethargic and unable to participate in further OT tx.     Patient left HOB elevated with all lines intact, call button in reach, and family present    GOALS:    Multidisciplinary Problems       Occupational Therapy Goals          Problem: Occupational Therapy    Goal Priority Disciplines Outcome Interventions   Occupational Therapy Goal     OT, PT/OT Progressing    Description: STG:  Pt will perform grooming with setup  Pt will bathe with Tegan with setup at EOB  Pt will perform UE dressing with Tracy  Pt will perform LE dressing with Tegan  Pt will sit EOB x 10 min with SBA  Pt will transfer bed/chair/bsc with CGA with RW  Pt will perform standing task x 2 min with CGA with RW   Pt will tolerate 15 minutes of tx without fatigue      LT.Restore to max I with self care and mobility.                           DME Justifications:  No DME recommended requiring DME justifications    Time Tracking:     OT Date of Treatment: 25  OT Start Time: 938  OT Stop Time: 950  OT Total Time (min): 12 min    Billable Minutes:Therapeutic Activity 12 minutes    OT/MAURICIO: OT          2025

## 2025-02-08 PROBLEM — I69.359 HISTORY OF HEMORRHAGIC STROKE WITH RESIDUAL HEMIPARESIS: Status: ACTIVE | Noted: 2025-02-08

## 2025-02-08 LAB
ANION GAP SERPL CALCULATED.3IONS-SCNC: 14 MMOL/L (ref 7–16)
BASOPHILS # BLD AUTO: 0.04 K/UL (ref 0–0.2)
BASOPHILS NFR BLD AUTO: 0.7 % (ref 0–1)
BUN SERPL-MCNC: 16 MG/DL (ref 10–20)
BUN/CREAT SERPL: 13 (ref 6–20)
CALCIUM SERPL-MCNC: 9.4 MG/DL (ref 8.4–10.2)
CHLORIDE SERPL-SCNC: 107 MMOL/L (ref 98–107)
CO2 SERPL-SCNC: 22 MMOL/L (ref 23–31)
CREAT SERPL-MCNC: 1.19 MG/DL (ref 0.55–1.02)
DIFFERENTIAL METHOD BLD: ABNORMAL
EGFR (NO RACE VARIABLE) (RUSH/TITUS): 46 ML/MIN/1.73M2
EOSINOPHIL # BLD AUTO: 0.27 K/UL (ref 0–0.5)
EOSINOPHIL NFR BLD AUTO: 4.7 % (ref 1–4)
ERYTHROCYTE [DISTWIDTH] IN BLOOD BY AUTOMATED COUNT: 12.2 % (ref 11.5–14.5)
GLUCOSE SERPL-MCNC: 148 MG/DL (ref 82–115)
GLUCOSE SERPL-MCNC: 154 MG/DL (ref 70–105)
GLUCOSE SERPL-MCNC: 189 MG/DL (ref 70–105)
GLUCOSE SERPL-MCNC: 210 MG/DL (ref 70–105)
GLUCOSE SERPL-MCNC: 218 MG/DL (ref 70–105)
GLUCOSE SERPL-MCNC: 235 MG/DL (ref 70–105)
HCT VFR BLD AUTO: 32.6 % (ref 38–47)
HGB BLD-MCNC: 10.6 G/DL (ref 12–16)
IMM GRANULOCYTES # BLD AUTO: 0.02 K/UL (ref 0–0.04)
IMM GRANULOCYTES NFR BLD: 0.4 % (ref 0–0.4)
LYMPHOCYTES # BLD AUTO: 1.66 K/UL (ref 1–4.8)
LYMPHOCYTES NFR BLD AUTO: 29.1 % (ref 27–41)
MAGNESIUM SERPL-MCNC: 1.7 MG/DL (ref 1.6–2.6)
MCH RBC QN AUTO: 29.5 PG (ref 27–31)
MCHC RBC AUTO-ENTMCNC: 32.5 G/DL (ref 32–36)
MCV RBC AUTO: 90.8 FL (ref 80–96)
MONOCYTES # BLD AUTO: 0.4 K/UL (ref 0–0.8)
MONOCYTES NFR BLD AUTO: 7 % (ref 2–6)
MPC BLD CALC-MCNC: 11.4 FL (ref 9.4–12.4)
NEUTROPHILS # BLD AUTO: 3.32 K/UL (ref 1.8–7.7)
NEUTROPHILS NFR BLD AUTO: 58.1 % (ref 53–65)
NRBC # BLD AUTO: 0 X10E3/UL
NRBC, AUTO (.00): 0 %
PHOSPHATE SERPL-MCNC: 3.1 MG/DL (ref 2.3–4.7)
PLATELET # BLD AUTO: 182 K/UL (ref 150–400)
PLATELET MORPHOLOGY: ABNORMAL
POTASSIUM SERPL-SCNC: 3.5 MMOL/L (ref 3.5–5.1)
RBC # BLD AUTO: 3.59 M/UL (ref 4.2–5.4)
RBC MORPH BLD: NORMAL
SODIUM SERPL-SCNC: 139 MMOL/L (ref 136–145)
WBC # BLD AUTO: 5.71 K/UL (ref 4.5–11)

## 2025-02-08 PROCEDURE — 36415 COLL VENOUS BLD VENIPUNCTURE: CPT | Performed by: STUDENT IN AN ORGANIZED HEALTH CARE EDUCATION/TRAINING PROGRAM

## 2025-02-08 PROCEDURE — 84100 ASSAY OF PHOSPHORUS: CPT | Performed by: STUDENT IN AN ORGANIZED HEALTH CARE EDUCATION/TRAINING PROGRAM

## 2025-02-08 PROCEDURE — 99232 SBSQ HOSP IP/OBS MODERATE 35: CPT | Mod: ,,,

## 2025-02-08 PROCEDURE — 25000003 PHARM REV CODE 250: Performed by: STUDENT IN AN ORGANIZED HEALTH CARE EDUCATION/TRAINING PROGRAM

## 2025-02-08 PROCEDURE — 63600175 PHARM REV CODE 636 W HCPCS: Performed by: STUDENT IN AN ORGANIZED HEALTH CARE EDUCATION/TRAINING PROGRAM

## 2025-02-08 PROCEDURE — 80048 BASIC METABOLIC PNL TOTAL CA: CPT | Performed by: STUDENT IN AN ORGANIZED HEALTH CARE EDUCATION/TRAINING PROGRAM

## 2025-02-08 PROCEDURE — 85025 COMPLETE CBC W/AUTO DIFF WBC: CPT | Performed by: STUDENT IN AN ORGANIZED HEALTH CARE EDUCATION/TRAINING PROGRAM

## 2025-02-08 PROCEDURE — 82962 GLUCOSE BLOOD TEST: CPT

## 2025-02-08 PROCEDURE — 27000207 HC ISOLATION

## 2025-02-08 PROCEDURE — 11000001 HC ACUTE MED/SURG PRIVATE ROOM

## 2025-02-08 PROCEDURE — 83735 ASSAY OF MAGNESIUM: CPT | Performed by: STUDENT IN AN ORGANIZED HEALTH CARE EDUCATION/TRAINING PROGRAM

## 2025-02-08 PROCEDURE — 63600175 PHARM REV CODE 636 W HCPCS: Performed by: NURSE PRACTITIONER

## 2025-02-08 RX ORDER — HYDROCHLOROTHIAZIDE 25 MG/1
25 TABLET ORAL DAILY
Status: DISCONTINUED | OUTPATIENT
Start: 2025-02-09 | End: 2025-02-10 | Stop reason: HOSPADM

## 2025-02-08 RX ORDER — LOSARTAN POTASSIUM 100 MG/1
100 TABLET ORAL DAILY
Status: DISCONTINUED | OUTPATIENT
Start: 2025-02-09 | End: 2025-02-10 | Stop reason: HOSPADM

## 2025-02-08 RX ADMIN — INSULIN ASPART 3 UNITS: 100 INJECTION, SOLUTION INTRAVENOUS; SUBCUTANEOUS at 11:02

## 2025-02-08 RX ADMIN — CIPROFLOXACIN 400 MG: 2 INJECTION, SOLUTION INTRAVENOUS at 12:02

## 2025-02-08 RX ADMIN — CIPROFLOXACIN 400 MG: 2 INJECTION, SOLUTION INTRAVENOUS at 11:02

## 2025-02-08 RX ADMIN — MUPIROCIN: 20 OINTMENT TOPICAL at 08:02

## 2025-02-08 RX ADMIN — HYDROCHLOROTHIAZIDE 12.5 MG: 12.5 TABLET ORAL at 08:02

## 2025-02-08 RX ADMIN — SERTRALINE HYDROCHLORIDE 100 MG: 50 TABLET ORAL at 08:02

## 2025-02-08 RX ADMIN — OXYBUTYNIN CHLORIDE 5 MG: 5 TABLET ORAL at 08:02

## 2025-02-08 RX ADMIN — INSULIN ASPART 1 UNITS: 100 INJECTION, SOLUTION INTRAVENOUS; SUBCUTANEOUS at 08:02

## 2025-02-08 RX ADMIN — ATORVASTATIN CALCIUM 40 MG: 40 TABLET, FILM COATED ORAL at 08:02

## 2025-02-08 RX ADMIN — LEVETIRACETAM 500 MG: 500 TABLET, FILM COATED ORAL at 08:02

## 2025-02-08 RX ADMIN — LEVOTHYROXINE SODIUM 100 MCG: 0.03 TABLET ORAL at 06:02

## 2025-02-08 RX ADMIN — LOSARTAN POTASSIUM 50 MG: 50 TABLET, FILM COATED ORAL at 08:02

## 2025-02-08 RX ADMIN — OXYCODONE 5 MG: 5 TABLET ORAL at 11:02

## 2025-02-08 RX ADMIN — SPIRONOLACTONE 25 MG: 25 TABLET ORAL at 08:02

## 2025-02-08 RX ADMIN — OXYCODONE 5 MG: 5 TABLET ORAL at 08:02

## 2025-02-08 RX ADMIN — METOPROLOL SUCCINATE 25 MG: 25 TABLET, EXTENDED RELEASE ORAL at 08:02

## 2025-02-08 RX ADMIN — PANTOPRAZOLE SODIUM 40 MG: 40 TABLET, DELAYED RELEASE ORAL at 08:02

## 2025-02-08 RX ADMIN — AMLODIPINE BESYLATE 10 MG: 10 TABLET ORAL at 08:02

## 2025-02-08 NOTE — SUBJECTIVE & OBJECTIVE
Interval History:      Review of Systems  Objective:     Vital Signs (Most Recent):  Temp: 98.8 °F (37.1 °C) (02/08/25 1147)  Pulse: 79 (02/08/25 1147)  Resp: 16 (02/08/25 1147)  BP: (!) 147/79 (02/08/25 1147)  SpO2: (!) 94 % (02/08/25 1147) Vital Signs (24h Range):  Temp:  [97.5 °F (36.4 °C)-98.8 °F (37.1 °C)] 98.8 °F (37.1 °C)  Pulse:  [68-82] 79  Resp:  [16-18] 16  SpO2:  [93 %-98 %] 94 %  BP: (143-167)/(68-91) 147/79     Weight: 64.4 kg (142 lb)  Body mass index is 26.83 kg/m².    Intake/Output Summary (Last 24 hours) at 2/8/2025 1242  Last data filed at 2/7/2025 2138  Gross per 24 hour   Intake 1000 ml   Output 750 ml   Net 250 ml         Physical Exam  Vitals reviewed.   Constitutional:       Appearance: Normal appearance.   HENT:      Head: Normocephalic and atraumatic.      Nose: Nose normal.   Eyes:      Extraocular Movements: Extraocular movements intact.      Conjunctiva/sclera: Conjunctivae normal.   Neck:      Trachea: Trachea normal.   Cardiovascular:      Rate and Rhythm: Normal rate and regular rhythm.      Pulses: Normal pulses.      Heart sounds: Normal heart sounds.   Pulmonary:      Effort: Pulmonary effort is normal.      Breath sounds: Normal breath sounds and air entry.   Abdominal:      General: Bowel sounds are normal.      Palpations: Abdomen is soft.   Genitourinary:     Comments: Hameed in place with bloody urine  Musculoskeletal:         General: Normal range of motion.      Cervical back: Neck supple.   Skin:     General: Skin is warm and dry.   Neurological:      General: No focal deficit present.      Mental Status: She is alert. Mental status is at baseline.      Comments: Grossly normal motor and sensory function without focal deficit appreciated.   Psychiatric:         Mood and Affect: Mood and affect normal.         Behavior: Behavior is cooperative.             Significant Labs: All pertinent labs within the past 24 hours have been reviewed.    Significant Imaging: I have reviewed  all pertinent imaging results/findings within the past 24 hours.

## 2025-02-08 NOTE — ASSESSMENT & PLAN NOTE
DAVID is likely due to acute tubular necrosis caused by infection . Baseline creatinine is  1.0 . Most recent creatinine and eGFR are listed below.  Recent Labs     02/06/25  0434 02/07/25  0542 02/08/25  0500   CREATININE 1.08* 1.47* 1.19*   EGFRNORACEVR 52* 36* 46*        Plan  - DAVID is improving  - Avoid nephrotoxins and renally dose meds for GFR listed above  - Monitor urine output, serial BMP, and adjust therapy as needed  - follow    2/7 urine cultures reviewed, started cipro per culture, will need IV over the next few days then evaluate for po and discharge

## 2025-02-08 NOTE — ASSESSMENT & PLAN NOTE
Anemia is likely due to acute blood loss which was from hematuria . Most recent hemoglobin and hematocrit are listed below.  Recent Labs     02/06/25  0434 02/07/25  0542 02/08/25  0500   HGB 9.8* 9.7* 10.6*   HCT 30.3* 29.7* 32.6*       Plan  - Monitor serial CBC: Daily  - Transfuse PRBC if patient becomes hemodynamically unstable, symptomatic or H/H drops below 7/21.  - Patient has not received any PRBC transfusions to date  - Patient's anemia is currently worsening. Will continue current treatment  - hold blood thinners  Follow

## 2025-02-08 NOTE — PLAN OF CARE
Problem: Adult Inpatient Plan of Care  Goal: Plan of Care Review  Outcome: Progressing  Goal: Patient-Specific Goal (Individualized)  Outcome: Progressing  Goal: Absence of Hospital-Acquired Illness or Injury  Outcome: Progressing  Goal: Optimal Comfort and Wellbeing  Outcome: Progressing  Goal: Readiness for Transition of Care  Outcome: Progressing     Problem: Infection  Goal: Absence of Infection Signs and Symptoms  Outcome: Progressing     Problem: Skin Injury Risk Increased  Goal: Skin Health and Integrity  Outcome: Progressing     Problem: Fall Injury Risk  Goal: Absence of Fall and Fall-Related Injury  Outcome: Progressing     Problem: Urinary Elimination Management  Goal: Effective Urinary Elimination/Continence  Outcome: Progressing     Problem: Diabetes Comorbidity  Goal: Blood Glucose Level Within Targeted Range  Outcome: Progressing     Problem: Acute Kidney Injury/Impairment  Goal: Fluid and Electrolyte Balance  Outcome: Progressing  Goal: Improved Oral Intake  Outcome: Progressing  Goal: Effective Renal Function  Outcome: Progressing

## 2025-02-08 NOTE — PROGRESS NOTES
[facility]  [facility]  Progress Note    Patient Name: Дмитрий Collazo  MRN: [id number]  Patient Class: IP- Inpatient   Admission Date: 2/5/2025  Length of Stay: 3 days  Attending Physician: Ronit Kaur MD  Primary Care Provider: Jennifer Zurita MD        Subjective     Principal Problem:Emphysematous cystitis        HPI:  79-year-old white female with a past medical history of Mobitz type 2 AV block status post pacemaker, EDGAR, hypertension, hyperlipidemia, diabetes, seizures, anemia who presents to the ED for hematuria.  She was in his show by [facility] for quite awhile awaiting transfer.  She finally became tired of waiting and self discharged then presented to [facility] for urologic evaluation.  Dr. Rodriguez in his seen.  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  No stone evident.  She has had some dysuria.  Denies any fevers.  Has chronic confusion.  Does endorse some vomiting as well.  Has otherwise been in her normal state of health.  Review of systems otherwise negative    Overview/Hospital Course:  2/6 Gnrs in the urine.  Cystoscopy today.  We will with hematuria  2/7 urine cultures reviewed, started cipro per culture, will need IV over the next few days then evaluate for po and discharge  2/8 continuing cipro, Noted: has history of hemorrhagic stroke 2 months ago, no DVT ppx, BP elevated today if continues tomorrow will increase meds    Interval History:      Review of Systems  Objective:     Vital Signs (Most Recent):  Temp: 98.8 °F (37.1 °C) (02/08/25 1147)  Pulse: 79 (02/08/25 1147)  Resp: 16 (02/08/25 1147)  BP: (!) 147/79 (02/08/25 1147)  SpO2: (!) 94 % (02/08/25 1147) Vital Signs (24h Range):  Temp:  [97.5 °F (36.4 °C)-98.8 °F (37.1 °C)] 98.8 °F (37.1 °C)  Pulse:  [68-82] 79  Resp:  [16-18] 16  SpO2:  [93 %-98 %] 94 %  BP: (143-167)/(68-91) 147/79     Weight: 64.4 kg (142 lb)  Body mass index is 26.83 kg/m².    Intake/Output  Summary (Last 24 hours) at 2/8/2025 1242  Last data filed at 2/7/2025 2138  Gross per 24 hour   Intake 1000 ml   Output 750 ml   Net 250 ml         Physical Exam  Vitals reviewed.   Constitutional:       Appearance: Normal appearance.   HENT:      Head: Normocephalic and atraumatic.      Nose: Nose normal.   Eyes:      Extraocular Movements: Extraocular movements intact.      Conjunctiva/sclera: Conjunctivae normal.   Neck:      Trachea: Trachea normal.   Cardiovascular:      Rate and Rhythm: Normal rate and regular rhythm.      Pulses: Normal pulses.      Heart sounds: Normal heart sounds.   Pulmonary:      Effort: Pulmonary effort is normal.      Breath sounds: Normal breath sounds and air entry.   Abdominal:      General: Bowel sounds are normal.      Palpations: Abdomen is soft.   Genitourinary:     Comments: Hameed in place with bloody urine  Musculoskeletal:         General: Normal range of motion.      Cervical back: Neck supple.   Skin:     General: Skin is warm and dry.   Neurological:      General: No focal deficit present.      Mental Status: She is alert. Mental status is at baseline.      Comments: Grossly normal motor and sensory function without focal deficit appreciated.   Psychiatric:         Mood and Affect: Mood and affect normal.         Behavior: Behavior is cooperative.             Significant Labs: All pertinent labs within the past 24 hours have been reviewed.    Significant Imaging: I have reviewed all pertinent imaging results/findings within the past 24 hours.    Assessment and Plan     * Emphysematous cystitis  Per Dr. Rodriguez.  Plan for OR tomorrow.  Cefepime  OR today.  Gram-negative rods is in urine.  Continue cefepime    2/8: cultures with sensitivity to cipro, abx changed, will need a few days IV cipro and can likely switch to PO Monday, will discuss with urology    History of hemorrhagic stroke with residual hemiparesis  Noted 2 months ago with hemorrhagic stroke, no anticoags    DAVID  (acute kidney injury)  DAVID is likely due to acute tubular necrosis caused by infection . Baseline creatinine is  1.0 . Most recent creatinine and eGFR are listed below.  Recent Labs     02/06/25  0434 02/07/25  0542 02/08/25  0500   CREATININE 1.08* 1.47* 1.19*   EGFRNORACEVR 52* 36* 46*        Plan  - DAVID is improving  - Avoid nephrotoxins and renally dose meds for GFR listed above  - Monitor urine output, serial BMP, and adjust therapy as needed  - follow    2/7 urine cultures reviewed, started cipro per culture, will need IV over the next few days then evaluate for po and discharge    Acute blood loss anemia  Anemia is likely due to acute blood loss which was from hematuria . Most recent hemoglobin and hematocrit are listed below.  Recent Labs     02/06/25  0434 02/07/25  0542 02/08/25  0500   HGB 9.8* 9.7* 10.6*   HCT 30.3* 29.7* 32.6*       Plan  - Monitor serial CBC: Daily  - Transfuse PRBC if patient becomes hemodynamically unstable, symptomatic or H/H drops below 7/21.  - Patient has not received any PRBC transfusions to date  - Patient's anemia is currently stable  - follow  Follow hemoglobin    Nausea & vomiting  Zofran p.r.n.      Fatigue  Progressive mobility protocol      UTI (urinary tract infection)  Cefepime follow up cultures  Gram-negative rods    Seizures  Keppra      Anemia  Anemia is likely due to acute blood loss which was from hematuria . Most recent hemoglobin and hematocrit are listed below.  Recent Labs     02/06/25  0434 02/07/25  0542 02/08/25  0500   HGB 9.8* 9.7* 10.6*   HCT 30.3* 29.7* 32.6*       Plan  - Monitor serial CBC: Daily  - Transfuse PRBC if patient becomes hemodynamically unstable, symptomatic or H/H drops below 7/21.  - Patient has not received any PRBC transfusions to date  - Patient's anemia is currently worsening. Will continue current treatment  - hold blood thinners  Follow    Other hydronephrosis  Per urology      Hyperglycemia due to diabetes mellitus  Sliding  "scale      Urinary retention  Hameed now in place  Still with hematuria    Cardiac pacemaker in situ  Stable      EDGAR (obstructive sleep apnea)  CPAP      AV block, Mobitz II  Pacemaker      Diabetes mellitus without complication  Patient's FSGs are uncontrolled due to hyperglycemia on current medication regimen.  Last A1c reviewed-   Lab Results   Component Value Date    HGBA1C 7.5 (H) 02/05/2025     Most recent fingerstick glucose reviewed- No results for input(s): "POCTGLUCOSE" in the last 24 hours.  Current correctional scale  Low  Maintain anti-hyperglycemic dose as follows-   Antihyperglycemics (From admission, onward)      Start     Stop Route Frequency Ordered    02/05/25 1944  insulin aspart U-100 injection 0-5 Units         -- SubQ Before meals & nightly PRN 02/05/25 1845          Hold Oral hypoglycemics while patient is in the hospital.  A1c pending    Hyperlipidemia  Statin      Essential hypertension  Patient's blood pressure range in the last 24 hours was: BP  Min: 143/68  Max: 167/91.The patient's inpatient anti-hypertensive regimen is listed below:  Current Antihypertensives  amLODIPine tablet 10 mg, Daily, Oral  metoprolol succinate (TOPROL-XL) 24 hr tablet 25 mg, Daily, Oral  spironolactone tablet 25 mg, Daily, Oral  losartan tablet 50 mg, Daily, Oral  hydroCHLOROthiazide tablet 12.5 mg, Daily, Oral  , Daily, Oral    Plan  - BP is controlled, no changes needed to their regimen  - follow   Stop clonidine add Aldactone      VTE Risk Mitigation (From admission, onward)           Ordered     Reason for No Pharmacological VTE Prophylaxis  Once        Question:  Reasons:  Answer:  Active Bleeding    02/05/25 1845     IP VTE HIGH RISK PATIENT  Once         02/05/25 1845     Place sequential compression device  Until discontinued         02/05/25 1845                    Discharge Planning   JUAN DIEGO: 2/8/2025     Code Status: Full Code   Medical Readiness for Discharge Date:   Discharge Plan A: Home with family, " Home Health                Please place Justification for DME        Ronit Kaur MD  Department of Hospital Medicine   [facility]

## 2025-02-08 NOTE — ASSESSMENT & PLAN NOTE
Anemia is likely due to acute blood loss which was from hematuria . Most recent hemoglobin and hematocrit are listed below.  Recent Labs     02/06/25  0434 02/07/25  0542 02/08/25  0500   HGB 9.8* 9.7* 10.6*   HCT 30.3* 29.7* 32.6*       Plan  - Monitor serial CBC: Daily  - Transfuse PRBC if patient becomes hemodynamically unstable, symptomatic or H/H drops below 7/21.  - Patient has not received any PRBC transfusions to date  - Patient's anemia is currently stable  - follow  Follow hemoglobin

## 2025-02-08 NOTE — ASSESSMENT & PLAN NOTE
Per Dr. Rodriguez.  Plan for OR tomorrow.  Cefepime  OR today.  Gram-negative rods is in urine.  Continue cefepime    2/8: cultures with sensitivity to cipro, abx changed, will need a few days IV cipro and can likely switch to PO Monday, will discuss with urology

## 2025-02-08 NOTE — ASSESSMENT & PLAN NOTE
Patient's blood pressure range in the last 24 hours was: BP  Min: 143/68  Max: 167/91.The patient's inpatient anti-hypertensive regimen is listed below:  Current Antihypertensives  amLODIPine tablet 10 mg, Daily, Oral  metoprolol succinate (TOPROL-XL) 24 hr tablet 25 mg, Daily, Oral  spironolactone tablet 25 mg, Daily, Oral  losartan tablet 50 mg, Daily, Oral  hydroCHLOROthiazide tablet 12.5 mg, Daily, Oral  , Daily, Oral    Plan  - BP is controlled, no changes needed to their regimen  - follow   Stop clonidine add Aldactone

## 2025-02-09 LAB
GLUCOSE SERPL-MCNC: 183 MG/DL (ref 70–105)
GLUCOSE SERPL-MCNC: 185 MG/DL (ref 70–105)
GLUCOSE SERPL-MCNC: 210 MG/DL (ref 70–105)
GLUCOSE SERPL-MCNC: 245 MG/DL (ref 70–105)

## 2025-02-09 PROCEDURE — 27000207 HC ISOLATION

## 2025-02-09 PROCEDURE — 99232 SBSQ HOSP IP/OBS MODERATE 35: CPT | Mod: ,,,

## 2025-02-09 PROCEDURE — 94761 N-INVAS EAR/PLS OXIMETRY MLT: CPT

## 2025-02-09 PROCEDURE — 11000001 HC ACUTE MED/SURG PRIVATE ROOM

## 2025-02-09 PROCEDURE — 25000003 PHARM REV CODE 250: Performed by: STUDENT IN AN ORGANIZED HEALTH CARE EDUCATION/TRAINING PROGRAM

## 2025-02-09 PROCEDURE — 25000003 PHARM REV CODE 250

## 2025-02-09 PROCEDURE — 63600175 PHARM REV CODE 636 W HCPCS: Performed by: NURSE PRACTITIONER

## 2025-02-09 PROCEDURE — 82962 GLUCOSE BLOOD TEST: CPT

## 2025-02-09 PROCEDURE — 99900035 HC TECH TIME PER 15 MIN (STAT)

## 2025-02-09 RX ORDER — POLYETHYLENE GLYCOL 3350 17 G/17G
17 POWDER, FOR SOLUTION ORAL 2 TIMES DAILY
Status: DISCONTINUED | OUTPATIENT
Start: 2025-02-09 | End: 2025-02-10 | Stop reason: HOSPADM

## 2025-02-09 RX ADMIN — POLYETHYLENE GLYCOL 3350 17 G: 17 POWDER, FOR SOLUTION ORAL at 10:02

## 2025-02-09 RX ADMIN — MUPIROCIN: 20 OINTMENT TOPICAL at 10:02

## 2025-02-09 RX ADMIN — LEVETIRACETAM 500 MG: 500 TABLET, FILM COATED ORAL at 10:02

## 2025-02-09 RX ADMIN — HYDROCHLOROTHIAZIDE 25 MG: 25 TABLET ORAL at 08:02

## 2025-02-09 RX ADMIN — OXYBUTYNIN CHLORIDE 5 MG: 5 TABLET ORAL at 10:02

## 2025-02-09 RX ADMIN — POLYETHYLENE GLYCOL 3350 17 G: 17 POWDER, FOR SOLUTION ORAL at 12:02

## 2025-02-09 RX ADMIN — OXYBUTYNIN CHLORIDE 5 MG: 5 TABLET ORAL at 08:02

## 2025-02-09 RX ADMIN — CIPROFLOXACIN 400 MG: 2 INJECTION, SOLUTION INTRAVENOUS at 12:02

## 2025-02-09 RX ADMIN — SERTRALINE HYDROCHLORIDE 100 MG: 50 TABLET ORAL at 08:02

## 2025-02-09 RX ADMIN — LEVOTHYROXINE SODIUM 100 MCG: 0.03 TABLET ORAL at 05:02

## 2025-02-09 RX ADMIN — PANTOPRAZOLE SODIUM 40 MG: 40 TABLET, DELAYED RELEASE ORAL at 08:02

## 2025-02-09 RX ADMIN — SPIRONOLACTONE 25 MG: 25 TABLET ORAL at 08:02

## 2025-02-09 RX ADMIN — LEVETIRACETAM 500 MG: 500 TABLET, FILM COATED ORAL at 08:02

## 2025-02-09 RX ADMIN — AMLODIPINE BESYLATE 10 MG: 10 TABLET ORAL at 08:02

## 2025-02-09 RX ADMIN — LOSARTAN POTASSIUM 100 MG: 100 TABLET, FILM COATED ORAL at 08:02

## 2025-02-09 RX ADMIN — MUPIROCIN: 20 OINTMENT TOPICAL at 08:02

## 2025-02-09 RX ADMIN — METOPROLOL SUCCINATE 25 MG: 25 TABLET, EXTENDED RELEASE ORAL at 08:02

## 2025-02-09 RX ADMIN — ATORVASTATIN CALCIUM 40 MG: 40 TABLET, FILM COATED ORAL at 10:02

## 2025-02-09 NOTE — NURSING
Lost IV access due to extravasation while administering IV antibiotics.  Patient c/o burning and hurting.  Area red, swollen, tender to touch.  Notified Natasha of incident, stated to leave out and will be started on PO antibiotics tomorrow.

## 2025-02-09 NOTE — ASSESSMENT & PLAN NOTE
Per Dr. Rodriguez.  Plan for OR tomorrow.  Cefepime  OR today.  Gram-negative rods is in urine.  Continue cefepime    2/8: cultures with sensitivity to cipro, abx changed, will need a few days IV cipro and can likely switch to PO Monday, will discuss with urology    2/9 discuss with nephrology tomorrow, will likely be able to DC with po Cipro

## 2025-02-09 NOTE — ASSESSMENT & PLAN NOTE
Anemia is likely due to acute blood loss which was from hematuria . Most recent hemoglobin and hematocrit are listed below.  Recent Labs     02/07/25  0542 02/08/25  0500   HGB 9.7* 10.6*   HCT 29.7* 32.6*       Plan  - Monitor serial CBC: Daily  - Transfuse PRBC if patient becomes hemodynamically unstable, symptomatic or H/H drops below 7/21.  - Patient has not received any PRBC transfusions to date  - Patient's anemia is currently worsening. Will continue current treatment  - hold blood thinners  Follow

## 2025-02-09 NOTE — PROGRESS NOTES
[facility]  [facility]  Progress Note    Patient Name: Дмитрий Collazo  MRN: [id number]  Patient Class: IP- Inpatient   Admission Date: 2/5/2025  Length of Stay: 4 days  Attending Physician: Ronit Kaur MD  Primary Care Provider: Jennifer Zurita MD        Subjective     Principal Problem:Emphysematous cystitis        HPI:  79-year-old white female with a past medical history of Mobitz type 2 AV block status post pacemaker, EDGAR, hypertension, hyperlipidemia, diabetes, seizures, anemia who presents to the ED for hematuria.  She was in his show by [facility] for quite awhile awaiting transfer.  She finally became tired of waiting and self discharged then presented to [facility] for urologic evaluation.  Dr. Rodriguez in his seen.  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  No stone evident.  She has had some dysuria.  Denies any fevers.  Has chronic confusion.  Does endorse some vomiting as well.  Has otherwise been in her normal state of health.  Review of systems otherwise negative    Overview/Hospital Course:  2/6 Gnrs in the urine.  Cystoscopy today.  We will with hematuria  2/7 urine cultures reviewed, started cipro per culture, will need IV over the next few days then evaluate for po and discharge  2/8 continuing cipro, Noted: has history of hemorrhagic stroke 2 months ago, no DVT ppx, BP elevated today if continues tomorrow will increase meds  2/9 discuss with nephrology tomorrow, will likely be able to DC with po Cipro    Interval History:      Review of Systems  Objective:     Vital Signs (Most Recent):  Temp: 98 °F (36.7 °C) (02/09/25 1131)  Pulse: 64 (02/09/25 1131)  Resp: 16 (02/09/25 1131)  BP: (!) 150/95 (02/09/25 1131)  SpO2: 95 % (02/09/25 1131) Vital Signs (24h Range):  Temp:  [98 °F (36.7 °C)-98.9 °F (37.2 °C)] 98 °F (36.7 °C)  Pulse:  [64-70] 64  Resp:  [16-18] 16  SpO2:  [94 %-98 %] 95 %  BP: (148-155)/(72-95) 150/95     Weight:  64.4 kg (142 lb)  Body mass index is 26.83 kg/m².    Intake/Output Summary (Last 24 hours) at 2/9/2025 1237  Last data filed at 2/9/2025 0857  Gross per 24 hour   Intake --   Output 1425 ml   Net -1425 ml         Physical Exam  Vitals reviewed.   Constitutional:       Appearance: Normal appearance.   HENT:      Head: Normocephalic and atraumatic.      Nose: Nose normal.   Eyes:      Extraocular Movements: Extraocular movements intact.      Conjunctiva/sclera: Conjunctivae normal.   Neck:      Trachea: Trachea normal.   Cardiovascular:      Rate and Rhythm: Normal rate and regular rhythm.      Pulses: Normal pulses.      Heart sounds: Normal heart sounds.   Pulmonary:      Effort: Pulmonary effort is normal.      Breath sounds: Normal breath sounds and air entry.   Abdominal:      General: Bowel sounds are normal.      Palpations: Abdomen is soft.   Genitourinary:     Comments: Hameed in place with bloody urine  Musculoskeletal:         General: Normal range of motion.      Cervical back: Neck supple.   Skin:     General: Skin is warm and dry.   Neurological:      General: No focal deficit present.      Mental Status: She is alert. Mental status is at baseline.      Comments: Grossly normal motor and sensory function without focal deficit appreciated.   Psychiatric:         Mood and Affect: Mood and affect normal.         Behavior: Behavior is cooperative.             Significant Labs: All pertinent labs within the past 24 hours have been reviewed.    Significant Imaging: I have reviewed all pertinent imaging results/findings within the past 24 hours.    Assessment and Plan     * Emphysematous cystitis  Per Dr. Rodriguez.  Plan for OR tomorrow.  Cefepime  OR today.  Gram-negative rods is in urine.  Continue cefepime    2/8: cultures with sensitivity to cipro, abx changed, will need a few days IV cipro and can likely switch to PO Monday, will discuss with urology    2/9 discuss with nephrology tomorrow, will likely be  able to DC with po Cipro    History of hemorrhagic stroke with residual hemiparesis  Noted 2 months ago with hemorrhagic stroke, no anticoags    DAVID (acute kidney injury)  DAVID is likely due to acute tubular necrosis caused by infection . Baseline creatinine is  1.0 . Most recent creatinine and eGFR are listed below.  Recent Labs     02/07/25  0542 02/08/25  0500   CREATININE 1.47* 1.19*   EGFRNORACEVR 36* 46*        Plan  - DAVID is improving  - Avoid nephrotoxins and renally dose meds for GFR listed above  - Monitor urine output, serial BMP, and adjust therapy as needed  - follow    2/7 urine cultures reviewed, started cipro per culture, will need IV over the next few days then evaluate for po and discharge    Acute blood loss anemia  Anemia is likely due to acute blood loss which was from hematuria . Most recent hemoglobin and hematocrit are listed below.  Recent Labs     02/06/25  0434 02/07/25  0542 02/08/25  0500   HGB 9.8* 9.7* 10.6*   HCT 30.3* 29.7* 32.6*       Plan  - Monitor serial CBC: Daily  - Transfuse PRBC if patient becomes hemodynamically unstable, symptomatic or H/H drops below 7/21.  - Patient has not received any PRBC transfusions to date  - Patient's anemia is currently stable  - follow  Follow hemoglobin    Nausea & vomiting  Zofran p.r.n.      Fatigue  Progressive mobility protocol      UTI (urinary tract infection)  Cefepime follow up cultures  Gram-negative rods    Seizures  Keppra      Anemia  Anemia is likely due to acute blood loss which was from hematuria . Most recent hemoglobin and hematocrit are listed below.  Recent Labs     02/07/25  0542 02/08/25  0500   HGB 9.7* 10.6*   HCT 29.7* 32.6*       Plan  - Monitor serial CBC: Daily  - Transfuse PRBC if patient becomes hemodynamically unstable, symptomatic or H/H drops below 7/21.  - Patient has not received any PRBC transfusions to date  - Patient's anemia is currently worsening. Will continue current treatment  - hold blood  "thinners  Follow    Other hydronephrosis  Per urology      Hyperglycemia due to diabetes mellitus  Sliding scale      Urinary retention  Hameed now in place  Still with hematuria    Cardiac pacemaker in situ  Stable      EDGAR (obstructive sleep apnea)  CPAP      AV block, Mobitz II  Pacemaker      Diabetes mellitus without complication  Patient's FSGs are uncontrolled due to hyperglycemia on current medication regimen.  Last A1c reviewed-   Lab Results   Component Value Date    HGBA1C 7.5 (H) 02/05/2025     Most recent fingerstick glucose reviewed- No results for input(s): "POCTGLUCOSE" in the last 24 hours.  Current correctional scale  Low  Maintain anti-hyperglycemic dose as follows-   Antihyperglycemics (From admission, onward)      Start     Stop Route Frequency Ordered    02/05/25 1944  insulin aspart U-100 injection 0-5 Units         -- SubQ Before meals & nightly PRN 02/05/25 1845          Hold Oral hypoglycemics while patient is in the hospital.  A1c pending    Hyperlipidemia  Statin      Essential hypertension  Patient's blood pressure range in the last 24 hours was: BP  Min: 143/68  Max: 167/91.The patient's inpatient anti-hypertensive regimen is listed below:  Current Antihypertensives  amLODIPine tablet 10 mg, Daily, Oral  metoprolol succinate (TOPROL-XL) 24 hr tablet 25 mg, Daily, Oral  spironolactone tablet 25 mg, Daily, Oral  losartan tablet 50 mg, Daily, Oral  hydroCHLOROthiazide tablet 12.5 mg, Daily, Oral  , Daily, Oral    Plan  - BP is controlled, no changes needed to their regimen  - follow   Stop clonidine add Aldactone      VTE Risk Mitigation (From admission, onward)           Ordered     Reason for No Pharmacological VTE Prophylaxis  Once        Question:  Reasons:  Answer:  Active Bleeding    02/05/25 1845     IP VTE HIGH RISK PATIENT  Once         02/05/25 1845     Place sequential compression device  Until discontinued         02/05/25 1845                    Discharge Planning   JUAN DIEGO: " 2/8/2025     Code Status: Full Code   Medical Readiness for Discharge Date:   Discharge Plan A: Home with family, Home Health                Please place Justification for DME        Ronit Kaur MD  Department of Hospital Medicine   [facility]

## 2025-02-09 NOTE — ASSESSMENT & PLAN NOTE
DAVID is likely due to acute tubular necrosis caused by infection . Baseline creatinine is  1.0 . Most recent creatinine and eGFR are listed below.  Recent Labs     02/07/25  0542 02/08/25  0500   CREATININE 1.47* 1.19*   EGFRNORACEVR 36* 46*        Plan  - DAVID is improving  - Avoid nephrotoxins and renally dose meds for GFR listed above  - Monitor urine output, serial BMP, and adjust therapy as needed  - follow    2/7 urine cultures reviewed, started cipro per culture, will need IV over the next few days then evaluate for po and discharge

## 2025-02-09 NOTE — SUBJECTIVE & OBJECTIVE
Interval History:      Review of Systems  Objective:     Vital Signs (Most Recent):  Temp: 98 °F (36.7 °C) (02/09/25 1131)  Pulse: 64 (02/09/25 1131)  Resp: 16 (02/09/25 1131)  BP: (!) 150/95 (02/09/25 1131)  SpO2: 95 % (02/09/25 1131) Vital Signs (24h Range):  Temp:  [98 °F (36.7 °C)-98.9 °F (37.2 °C)] 98 °F (36.7 °C)  Pulse:  [64-70] 64  Resp:  [16-18] 16  SpO2:  [94 %-98 %] 95 %  BP: (148-155)/(72-95) 150/95     Weight: 64.4 kg (142 lb)  Body mass index is 26.83 kg/m².    Intake/Output Summary (Last 24 hours) at 2/9/2025 1237  Last data filed at 2/9/2025 0857  Gross per 24 hour   Intake --   Output 1425 ml   Net -1425 ml         Physical Exam  Vitals reviewed.   Constitutional:       Appearance: Normal appearance.   HENT:      Head: Normocephalic and atraumatic.      Nose: Nose normal.   Eyes:      Extraocular Movements: Extraocular movements intact.      Conjunctiva/sclera: Conjunctivae normal.   Neck:      Trachea: Trachea normal.   Cardiovascular:      Rate and Rhythm: Normal rate and regular rhythm.      Pulses: Normal pulses.      Heart sounds: Normal heart sounds.   Pulmonary:      Effort: Pulmonary effort is normal.      Breath sounds: Normal breath sounds and air entry.   Abdominal:      General: Bowel sounds are normal.      Palpations: Abdomen is soft.   Genitourinary:     Comments: Hameed in place with bloody urine  Musculoskeletal:         General: Normal range of motion.      Cervical back: Neck supple.   Skin:     General: Skin is warm and dry.   Neurological:      General: No focal deficit present.      Mental Status: She is alert. Mental status is at baseline.      Comments: Grossly normal motor and sensory function without focal deficit appreciated.   Psychiatric:         Mood and Affect: Mood and affect normal.         Behavior: Behavior is cooperative.             Significant Labs: All pertinent labs within the past 24 hours have been reviewed.    Significant Imaging: I have reviewed all  pertinent imaging results/findings within the past 24 hours.

## 2025-02-10 LAB
GLUCOSE SERPL-MCNC: 224 MG/DL (ref 70–105)
GLUCOSE SERPL-MCNC: 236 MG/DL (ref 70–105)
GLUCOSE SERPL-MCNC: 298 MG/DL (ref 70–105)

## 2025-02-10 PROCEDURE — 25000003 PHARM REV CODE 250

## 2025-02-10 PROCEDURE — 82962 GLUCOSE BLOOD TEST: CPT

## 2025-02-10 PROCEDURE — 99239 HOSP IP/OBS DSCHRG MGMT >30: CPT | Mod: ,,,

## 2025-02-10 PROCEDURE — 25000003 PHARM REV CODE 250: Performed by: STUDENT IN AN ORGANIZED HEALTH CARE EDUCATION/TRAINING PROGRAM

## 2025-02-10 PROCEDURE — 97530 THERAPEUTIC ACTIVITIES: CPT

## 2025-02-10 PROCEDURE — 97116 GAIT TRAINING THERAPY: CPT

## 2025-02-10 RX ORDER — CIPROFLOXACIN 500 MG/1
500 TABLET ORAL EVERY 12 HOURS
Qty: 20 TABLET | Refills: 0 | Status: SHIPPED | OUTPATIENT
Start: 2025-02-10 | End: 2025-02-20

## 2025-02-10 RX ORDER — SPIRONOLACTONE 25 MG/1
25 TABLET ORAL DAILY
Qty: 30 TABLET | Refills: 11 | Status: SHIPPED | OUTPATIENT
Start: 2025-02-11 | End: 2026-02-11

## 2025-02-10 RX ADMIN — METOPROLOL SUCCINATE 25 MG: 25 TABLET, EXTENDED RELEASE ORAL at 08:02

## 2025-02-10 RX ADMIN — SPIRONOLACTONE 25 MG: 25 TABLET ORAL at 08:02

## 2025-02-10 RX ADMIN — OXYBUTYNIN CHLORIDE 5 MG: 5 TABLET ORAL at 08:02

## 2025-02-10 RX ADMIN — LEVETIRACETAM 500 MG: 500 TABLET, FILM COATED ORAL at 08:02

## 2025-02-10 RX ADMIN — HYDROCHLOROTHIAZIDE 25 MG: 25 TABLET ORAL at 08:02

## 2025-02-10 RX ADMIN — PANTOPRAZOLE SODIUM 40 MG: 40 TABLET, DELAYED RELEASE ORAL at 08:02

## 2025-02-10 RX ADMIN — POLYETHYLENE GLYCOL 3350 17 G: 17 POWDER, FOR SOLUTION ORAL at 08:02

## 2025-02-10 RX ADMIN — SERTRALINE HYDROCHLORIDE 100 MG: 50 TABLET ORAL at 08:02

## 2025-02-10 RX ADMIN — AMLODIPINE BESYLATE 10 MG: 10 TABLET ORAL at 08:02

## 2025-02-10 RX ADMIN — LEVOTHYROXINE SODIUM 100 MCG: 0.03 TABLET ORAL at 05:02

## 2025-02-10 RX ADMIN — LOSARTAN POTASSIUM 100 MG: 100 TABLET, FILM COATED ORAL at 08:02

## 2025-02-10 NOTE — DISCHARGE SUMMARY
[facility]  [facility] Medicine  Discharge Summary      Patient Name: Дмитрий Collazo  MRN: [id number]  TRAY: [id number]  Patient Class: IP- Inpatient  Admission Date: 2/5/2025  Hospital Length of Stay: 5 days  Discharge Date and Time:  02/10/2025 3:01 PM  Attending Physician: Ronit Kaur MD   Discharging Provider: Ronit Kaur MD  Primary Care Provider: Jennifer Zurita MD    Primary Care Team: Networked reference to record PCT     HPI:   79-year-old white female with a past medical history of Mobitz type 2 AV block status post pacemaker, EDGAR, hypertension, hyperlipidemia, diabetes, seizures, anemia who presents to the ED for hematuria.  She was in his show by General for quite awhile awaiting transfer.  She finally became tired of waiting and self discharged then presented to [facility] for urologic evaluation.  Dr. Rodriguez in his seen.  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  No stone evident.  She has had some dysuria.  Denies any fevers.  Has chronic confusion.  Does endorse some vomiting as well.  Has otherwise been in her normal state of health.  Review of systems otherwise negative    * No surgery found *      Hospital Course:   2/6 Gnrs in the urine.  Cystoscopy today.  We will with hematuria  2/7 urine cultures reviewed, started cipro per culture, will need IV over the next few days then evaluate for po and discharge  2/8 continuing cipro, Noted: has history of hemorrhagic stroke 2 months ago, no DVT ppx, BP elevated today if continues tomorrow will increase meds  2/9 discuss with nephrology tomorrow, will likely be able to DC with po Cipro  2/10 discussed with urology, ok to go home on PO cipro today and follow up in their office as instructed, patient to keep dove in until urology discontinues it.     Goals of Care Treatment Preferences:  Code Status: Full Code      SDOH Screening:  The patient declined to be screened for utility  difficulties, food insecurity, transport difficulties, housing insecurity, and interpersonal safety, so no concerns could be identified this admission.     Consults:   Consults (From admission, onward)          Status Ordering Provider     Inpatient consult to Urology  Once        Provider:  Sebas Villalpando MD    Completed SUDHAKAR MORALES.            * Emphysematous cystitis  Per Dr. Rodriguez.  Plan for OR tomorrow.  Cefepime  OR today.  Gram-negative rods is in urine.  Continue cefepime    2/8: cultures with sensitivity to cipro, abx changed, will need a few days IV cipro and can likely switch to PO Monday, will discuss with urology    2/9 discuss with nephrology tomorrow, will likely be able to DC with po Cipro    History of hemorrhagic stroke with residual hemiparesis  Noted 2 months ago with hemorrhagic stroke, no anticoags    DAVID (acute kidney injury)  DAVID is likely due to acute tubular necrosis caused by infection . Baseline creatinine is  1.0 . Most recent creatinine and eGFR are listed below.  Recent Labs     02/07/25  0542 02/08/25  0500   CREATININE 1.47* 1.19*   EGFRNORACEVR 36* 46*        Plan  - DAVID is improving  - Avoid nephrotoxins and renally dose meds for GFR listed above  - Monitor urine output, serial BMP, and adjust therapy as needed  - follow    2/7 urine cultures reviewed, started cipro per culture, will need IV over the next few days then evaluate for po and discharge    Acute blood loss anemia  Anemia is likely due to acute blood loss which was from hematuria . Most recent hemoglobin and hematocrit are listed below.  Recent Labs     02/08/25  0500   HGB 10.6*   HCT 32.6*       Plan  - Monitor serial CBC: Daily  - Transfuse PRBC if patient becomes hemodynamically unstable, symptomatic or H/H drops below 7/21.  - Patient has not received any PRBC transfusions to date  - Patient's anemia is currently stable  - follow  Follow hemoglobin    Nausea & vomiting  Zofran  "p.r.n.      Fatigue  Progressive mobility protocol      UTI (urinary tract infection)  Cefepime follow up cultures  Gram-negative rods    Seizures  Keppra      Anemia  Anemia is likely due to acute blood loss which was from hematuria . Most recent hemoglobin and hematocrit are listed below.  Recent Labs     02/07/25  0542 02/08/25  0500   HGB 9.7* 10.6*   HCT 29.7* 32.6*       Plan  - Monitor serial CBC: Daily  - Transfuse PRBC if patient becomes hemodynamically unstable, symptomatic or H/H drops below 7/21.  - Patient has not received any PRBC transfusions to date  - Patient's anemia is currently worsening. Will continue current treatment  - hold blood thinners  Follow    Other hydronephrosis  Per urology      Hyperglycemia due to diabetes mellitus  Sliding scale      Urinary retention  Hameed now in place  Still with hematuria    Cardiac pacemaker in situ  Stable      EDGAR (obstructive sleep apnea)  CPAP      AV block, Mobitz II  Pacemaker      Diabetes mellitus without complication  Patient's FSGs are uncontrolled due to hyperglycemia on current medication regimen.  Last A1c reviewed-   Lab Results   Component Value Date    HGBA1C 7.5 (H) 02/05/2025     Most recent fingerstick glucose reviewed- No results for input(s): "POCTGLUCOSE" in the last 24 hours.  Current correctional scale  Low  Maintain anti-hyperglycemic dose as follows-   Antihyperglycemics (From admission, onward)      Start     Stop Route Frequency Ordered    02/05/25 1944  insulin aspart U-100 injection 0-5 Units         -- SubQ Before meals & nightly PRN 02/05/25 1845          Hold Oral hypoglycemics while patient is in the hospital.  A1c pending    Hyperlipidemia  Statin      Essential hypertension  Patient's blood pressure range in the last 24 hours was: BP  Min: 143/68  Max: 167/91.The patient's inpatient anti-hypertensive regimen is listed below:  Current Antihypertensives  amLODIPine tablet 10 mg, Daily, Oral  metoprolol succinate (TOPROL-XL) " 24 hr tablet 25 mg, Daily, Oral  spironolactone tablet 25 mg, Daily, Oral  losartan tablet 50 mg, Daily, Oral  hydroCHLOROthiazide tablet 12.5 mg, Daily, Oral  , Daily, Oral    Plan  - BP is controlled, no changes needed to their regimen  - follow   Stop clonidine add Aldactone      Final Active Diagnoses:    Diagnosis Date Noted POA    PRINCIPAL PROBLEM:  Emphysematous cystitis [N30.80] 02/05/2025 Yes    History of hemorrhagic stroke with residual hemiparesis [I69.359] 02/08/2025 Not Applicable    DAVID (acute kidney injury) [N17.9] 02/06/2025 Yes    Urinary retention [R33.9] 02/05/2025 Yes    Hyperglycemia due to diabetes mellitus [E11.65] 02/05/2025 Yes    Other hydronephrosis [N13.39] 02/05/2025 Yes    Anemia [D64.9] 02/05/2025 Yes    Seizures [R56.9] 02/05/2025 Yes    UTI (urinary tract infection) [N39.0] 02/05/2025 Yes    Fatigue [R53.83] 02/05/2025 Yes    Nausea & vomiting [R11.2] 02/05/2025 Yes    Acute blood loss anemia [D62] 02/05/2025 Yes    Cardiac pacemaker in situ [Z95.0] 01/13/2025 Yes    EDGAR (obstructive sleep apnea) [G47.33] 02/20/2024 Yes     Chronic    AV block, Mobitz II [I44.1] 02/20/2024 Yes     Chronic    Diabetes mellitus without complication [E11.9] 08/28/2023 Yes    Essential hypertension [I10] 04/29/2023 Yes    Hyperlipidemia [E78.5] 04/29/2023 Yes      Problems Resolved During this Admission:       Discharged Condition: stable    Disposition: Home or Self Care    Follow Up:   Contact information for follow-up providers       Sebas Villalpando MD. Call in 2 week(s).    Specialty: Urology  Contact information:  [address] [facility] [place] [address]  [phone]               Jennifer Zurita MD Follow up.    Specialty: Family Medicine  Contact information:  [address] [facility] [place] [address]  [phone]                       Contact information for after-discharge care       Home Medical Care       [facility] .    Service: Home Health  Services  Contact information:  [address] [place] [address]  [phone]                                 Patient Instructions:      Diet diabetic     Diet Cardiac     Notify your health care provider if you experience any of the following:  temperature >100.4     Notify your health care provider if you experience any of the following:  persistent nausea and vomiting or diarrhea     Notify your health care provider if you experience any of the following:  difficulty breathing or increased cough     Notify your health care provider if you experience any of the following:  severe persistent headache     Notify your health care provider if you experience any of the following:  persistent dizziness, light-headedness, or visual disturbances     Notify your health care provider if you experience any of the following:  increased confusion or weakness     Activity as tolerated       Significant Diagnostic Studies: N/A    Pending Diagnostic Studies:       None           Medications:  Reconciled Home Medications:      Medication List        START taking these medications      ciprofloxacin HCl 500 MG tablet  Commonly known as: CIPRO  Take 1 tablet (500 mg total) by mouth every 12 (twelve) hours. for 10 days     spironolactone 25 MG tablet  Commonly known as: ALDACTONE  Take 1 tablet (25 mg total) by mouth once daily.  Start taking on: February 11, 2025            CONTINUE taking these medications      amLODIPine 10 MG tablet  Commonly known as: NORVASC  Take 10 mg by mouth once daily.     irbesartan 150 MG tablet  Commonly known as: AVAPRO  Take 150 mg by mouth once daily.     levETIRAcetam 500 MG Tab  Commonly known as: KEPPRA  Take 1 tablet by mouth 2 (two) times daily.     levothyroxine 100 MCG tablet  Commonly known as: SYNTHROID  Take 100 mcg by mouth before breakfast.     metoprolol succinate 25 MG 24 hr tablet  Commonly known as: TOPROL-XL  Take 1 tablet (25 mg total) by mouth once daily.      omeprazole 20 MG capsule  Commonly known as: PRILOSEC  Take 20 mg by mouth once daily.     oxybutynin 5 MG Tab  Commonly known as: DITROPAN  Take 5 mg by mouth 2 (two) times daily.     potassium chloride 20 mEq  Commonly known as: K-TAB  Take 20 mEq by mouth 2 (two) times daily.     sertraline 100 MG tablet  Commonly known as: ZOLOFT  Take 100 mg by mouth once daily.     simvastatin 40 MG tablet  Commonly known as: ZOCOR  Take 40 mg by mouth every evening.              Indwelling Lines/Drains at time of discharge:   Lines/Drains/Airways       Drain  Duration                  Urethral Catheter 02/05/25 1426 5 days                    Time spent on the discharge of patient: 40 minutes         Ronit Kaur MD  Department of Hospital Medicine  [facility]

## 2025-02-10 NOTE — PROGRESS NOTES
[facility]  Urology  Progress Note    Patient Name: Дмитрий Collazo  MRN: [id number]  Admission Date: 2/5/2025  Hospital Length of Stay: 5 days  Code Status: Full Code   Attending Provider: Ronit Kaur MD   Primary Care Physician: Jennifer Zurita MD    Subjective:     HPI:  The patient is an 79-year-old female that was recently released from an outside facility with a complicated urinary tract infection and presented to the ER with emphysematous cystitis.  The patient has a history of diabetes mellitus and a previous urinary tract infection that has failed to clear.  A comprehensive examination was performed demonstrating acute kidney injury with bladder distention and a noncontrast CT scan of the abdomen and pelvis was performed demonstrating emphysematous cystitis.  The patient is accompanied by her daughter and reports that they were released from the outside hospital to seek urology.  Upon arrival in the emergency room Urology was consulted for emphysematous cystitis with bladder outlet obstruction and a Hameed catheter was placed upon arrival.  The patient is tolerating the catheter.  She is having gross hematuria.     Interval History:  MAG 3 lasix renal performed. Evidence of delay on the right was visualized. Patient resting comfortably in bed with daughter at bedside.  Indwelling Hameed catheter intact and draining clear yellow urine.  Creatinine 1.19.  Maintain indwelling Hameed catheter.    Review of Systems   Constitutional:  Positive for activity change.   HENT:  Negative for voice change.    Eyes:  Negative for discharge.   Respiratory:  Negative for cough and shortness of breath.    Cardiovascular:  Negative for chest pain.   Genitourinary:  Positive for difficulty urinating.   Skin:  Negative for color change.   Neurological:  Negative for tremors.   Psychiatric/Behavioral:  Negative for agitation.      Objective:     Temp:  [97.9 °F (36.6 °C)-98.2 °F (36.8 °C)] 98 °F (36.7  °C)  Pulse:  [64-87] 84  Resp:  [16-20] 20  SpO2:  [93 %-98 %] 95 %  BP: (150-171)/(75-99) 161/96     Body mass index is 26.83 kg/m².           Drains       Drain  Duration                  Urethral Catheter 02/05/25 1426 4 days                     Physical Exam  Vitals and nursing note reviewed.   Constitutional:       General: She is not in acute distress.  HENT:      Head: Normocephalic.   Eyes:      Conjunctiva/sclera: Conjunctivae normal.   Cardiovascular:      Rate and Rhythm: Normal rate.   Pulmonary:      Effort: Pulmonary effort is normal. No respiratory distress.   Genitourinary:     Comments: Indwelling Hameed catheter intact and draining clear yellow urine  Musculoskeletal:      Cervical back: Normal range of motion.   Skin:     General: Skin is warm and dry.   Neurological:      General: No focal deficit present.      Mental Status: She is alert.   Psychiatric:         Mood and Affect: Mood normal.         Behavior: Behavior normal.           Significant Labs:    BMP:  Recent Labs   Lab 02/06/25  0434 02/07/25  0542 02/08/25  0500    141 139   K 3.8 3.7 3.5   * 110* 107   CO2 19* 24 22*   BUN 12 17 16   CREATININE 1.08* 1.47* 1.19*   CALCIUM 9.1 9.1 9.4       CBC:   Recent Labs   Lab 02/06/25  0434 02/07/25  0542 02/08/25  0500   WBC 6.92 5.42 5.71   HGB 9.8* 9.7* 10.6*   HCT 30.3* 29.7* 32.6*    200 182       All pertinent labs results from the past 24 hours have been reviewed.    Significant Imaging:  All pertinent imaging results/findings from the past 24 hours have been reviewed.        Plan:  There is delayed excretion on the right with evidence of impaired function.  She may require intervention but at this time plan to continue Hameed catheter and Urology will continue to follow.         Assessment/Plan:     * Emphysematous cystitis  Maintain Hameed catheter     UTI (urinary tract infection)         Other hydronephrosis       Hyperglycemia due to diabetes mellitus       Urinary  retention  Maintain Hameed catheter         VTE Risk Mitigation (From admission, onward)           Ordered     Reason for No Pharmacological VTE Prophylaxis  Once        Question:  Reasons:  Answer:  Active Bleeding    02/05/25 1845     IP VTE HIGH RISK PATIENT  Once         02/05/25 1845     Place sequential compression device  Until discontinued         02/05/25 1845                    Ricci Valdez NP  Urology  [facility]

## 2025-02-10 NOTE — PT/OT/SLP PROGRESS
Occupational Therapy      Patient Name:  Дмитрий Collazo   MRN:  [id number]    Patient not seen today secondary to Patient unwilling to participate, Patient ill (Comment)Pt attempted to participate but was feeling nauseated. Will follow-up 8-11-25.    2/10/2025

## 2025-02-10 NOTE — SUBJECTIVE & OBJECTIVE
Interval History:  MAG 3 lasix renal performed. Evidence of delay on the right was visualized. Patient resting comfortably in bed with daughter at bedside.  Indwelling Hameed catheter intact and draining clear yellow urine.  Creatinine 1.19.  Maintain indwelling Hameed catheter.    Review of Systems   Constitutional:  Positive for activity change.   HENT:  Negative for voice change.    Eyes:  Negative for discharge.   Respiratory:  Negative for cough and shortness of breath.    Cardiovascular:  Negative for chest pain.   Genitourinary:  Positive for difficulty urinating.   Skin:  Negative for color change.   Neurological:  Negative for tremors.   Psychiatric/Behavioral:  Negative for agitation.      Objective:     Temp:  [97.9 °F (36.6 °C)-98.2 °F (36.8 °C)] 98 °F (36.7 °C)  Pulse:  [64-87] 84  Resp:  [16-20] 20  SpO2:  [93 %-98 %] 95 %  BP: (150-171)/(75-99) 161/96     Body mass index is 26.83 kg/m².           Drains       Drain  Duration                  Urethral Catheter 02/05/25 1426 4 days                     Physical Exam  Vitals and nursing note reviewed.   Constitutional:       General: She is not in acute distress.  HENT:      Head: Normocephalic.   Eyes:      Conjunctiva/sclera: Conjunctivae normal.   Cardiovascular:      Rate and Rhythm: Normal rate.   Pulmonary:      Effort: Pulmonary effort is normal. No respiratory distress.   Genitourinary:     Comments: Indwelling Hameed catheter intact and draining clear yellow urine  Musculoskeletal:      Cervical back: Normal range of motion.   Skin:     General: Skin is warm and dry.   Neurological:      General: No focal deficit present.      Mental Status: She is alert.   Psychiatric:         Mood and Affect: Mood normal.         Behavior: Behavior normal.           Significant Labs:    BMP:  Recent Labs   Lab 02/06/25  0434 02/07/25  0542 02/08/25  0500    141 139   K 3.8 3.7 3.5   * 110* 107   CO2 19* 24 22*   BUN 12 17 16   CREATININE 1.08* 1.47*  1.19*   CALCIUM 9.1 9.1 9.4       CBC:   Recent Labs   Lab 02/06/25  0434 02/07/25  0542 02/08/25  0500   WBC 6.92 5.42 5.71   HGB 9.8* 9.7* 10.6*   HCT 30.3* 29.7* 32.6*    200 182       All pertinent labs results from the past 24 hours have been reviewed.    Significant Imaging:  All pertinent imaging results/findings from the past 24 hours have been reviewed.        Plan:  There is delayed excretion on the right with evidence of impaired function.  She may require intervention but at this time plan to continue Hameed catheter and Urology will continue to follow.

## 2025-02-10 NOTE — PLAN OF CARE
CM spoke with patient's daughter at bedside.  Family considering SWB for patient.  CM will follow up with family tomorrow.  CM will continue to follow for DC needs as they arise.

## 2025-02-10 NOTE — ASSESSMENT & PLAN NOTE
Anemia is likely due to acute blood loss which was from hematuria . Most recent hemoglobin and hematocrit are listed below.  Recent Labs     02/08/25  0500   HGB 10.6*   HCT 32.6*       Plan  - Monitor serial CBC: Daily  - Transfuse PRBC if patient becomes hemodynamically unstable, symptomatic or H/H drops below 7/21.  - Patient has not received any PRBC transfusions to date  - Patient's anemia is currently stable  - follow  Follow hemoglobin

## 2025-02-10 NOTE — NURSING
Dc papers given to and gone over with the patient and her daughter. Neither party voice any objections or concerns in relation to the dc plan. Pt dc'ing home with indwelling cath that was placed by urology services. Extra leg straps and chg wipes given to the patient. No iv access ; pt and daughter aware of new medications and verified that the pharmacy listed on dc paperwork is the correct pharmacy. All belongings in possession of pt.

## 2025-02-11 VITALS
TEMPERATURE: 97 F | HEART RATE: 77 BPM | WEIGHT: 142 LBS | OXYGEN SATURATION: 94 % | BODY MASS INDEX: 26.81 KG/M2 | SYSTOLIC BLOOD PRESSURE: 157 MMHG | DIASTOLIC BLOOD PRESSURE: 89 MMHG | RESPIRATION RATE: 18 BRPM | HEIGHT: 61 IN

## 2025-02-11 NOTE — PLAN OF CARE
[facility]  Discharge Final Note    Primary Care Provider: Jennifer Zurita MD    Expected Discharge Date: 2/10/2025    Final Discharge Note (most recent)       Final Note - 02/11/25 0825          Final Note    Assessment Type Final Discharge Note     Anticipated Discharge Disposition Home-Health Care [facility]     What phone number can be called within the next 1-3 days to see how you are doing after discharge? [phone]        Post-Acute Status    Post-Acute Authorization Home Health     Home Health Status Set-up Complete/Auth obtained     Coverage Medicare A & B     Patient choice form signed by patient/caregiver List with quality metrics by geographic area provided;List from CMS Compare;List from System Post-Acute Care     Discharge Delays None known at this time                     Important Message from Medicare  Important Message from Medicare regarding Discharge Appeal Rights: Given to patient/caregiver, Explained to patient/caregiver, Signed/date by patient/caregiver     Date IMM was signed: 02/10/25  Time IMM was signed: 1115     Follow-up providers       Sebas Villalpando MD   Specialty: Urology    [address] [facility] [address]   Phone: [phone]       Next Steps: Call in 2 week(s)    Instructions: office to call patient with appt    Jennifer Zurita MD   Specialty: Family Medicine   Relationship: PCP - General    [address] [facility] [place] [address]   Phone: [phone]       Next Steps: Follow up    Instructions: Please follow up on February 18 at 8:30 a.m              After-discharge care                Home Medical Care       [facility]   Service: Home Health Services    [address] [place] [id number]   Phone: [phone]                             Family has decided to go home with HH instead of SWB.  Patient will be discharged home today with [facility] .  DC information faxed and Lis notified.  IM current.   No further needs.

## 2025-02-12 ENCOUNTER — PATIENT OUTREACH (OUTPATIENT)
Dept: ADMINISTRATIVE | Facility: CLINIC | Age: 80
End: 2025-02-12

## 2025-02-12 NOTE — PROGRESS NOTES
C3 nurse attempted to contact Дмитрий Collazo  for a TCC post hospital discharge follow up call. No answer. Left voicemail with callback information. The patient has a scheduled HOSFU appointment with Jennifer Zurita MD  on 2/18/25 @ 0830.

## 2025-02-12 NOTE — PROGRESS NOTES
C3 nurse spoke with Дмитрий Collazo daughter in law,Teresita,  for a TCC post hospital discharge follow up call. The patient has a scheduled HOSFU appointment with DR Eusebio Huertas on 2/18/25 @ 7684. Unable to do med review d/t daughter in law not at pts home at time of call. Callback information given.

## 2025-03-04 ENCOUNTER — OFFICE VISIT (OUTPATIENT)
Dept: UROLOGY | Facility: CLINIC | Age: 80
End: 2025-03-04
Payer: MEDICARE

## 2025-03-04 VITALS
HEART RATE: 69 BPM | BODY MASS INDEX: 26.81 KG/M2 | DIASTOLIC BLOOD PRESSURE: 75 MMHG | SYSTOLIC BLOOD PRESSURE: 139 MMHG | OXYGEN SATURATION: 97 % | HEIGHT: 61 IN | WEIGHT: 142 LBS

## 2025-03-04 DIAGNOSIS — R33.9 URINARY RETENTION: Primary | ICD-10-CM

## 2025-03-04 DIAGNOSIS — D64.9 ANEMIA, UNSPECIFIED TYPE: ICD-10-CM

## 2025-03-04 DIAGNOSIS — N30.80 EMPHYSEMATOUS CYSTITIS: ICD-10-CM

## 2025-03-04 DIAGNOSIS — I69.359 HISTORY OF HEMORRHAGIC STROKE WITH RESIDUAL HEMIPARESIS: ICD-10-CM

## 2025-03-04 PROCEDURE — 99214 OFFICE O/P EST MOD 30 MIN: CPT | Mod: S$PBB,25,, | Performed by: UROLOGY

## 2025-03-04 PROCEDURE — 99214 OFFICE O/P EST MOD 30 MIN: CPT | Mod: PBBFAC | Performed by: UROLOGY

## 2025-03-04 PROCEDURE — 99999 PR PBB SHADOW E&M-EST. PATIENT-LVL IV: CPT | Mod: PBBFAC,,, | Performed by: UROLOGY

## 2025-03-04 PROCEDURE — 51702 INSERT TEMP BLADDER CATH: CPT | Mod: S$PBB,,, | Performed by: UROLOGY

## 2025-03-04 PROCEDURE — 51702 INSERT TEMP BLADDER CATH: CPT | Mod: PBBFAC | Performed by: UROLOGY

## 2025-03-04 PROCEDURE — 87086 URINE CULTURE/COLONY COUNT: CPT | Mod: ,,, | Performed by: CLINICAL MEDICAL LABORATORY

## 2025-03-04 NOTE — PROGRESS NOTES
Assessment:   1. Urinary retention  -     Urine Culture High Risk; Future; Expected date: 03/04/2025    2. Emphysematous cystitis    3. History of hemorrhagic stroke with residual hemiparesis    4. Anemia, unspecified type         Plan:     The patient had accidentally removed her Hameed catheter yesterday and on today's examination was displaying urinary retention and a Hameed catheter was placed and a urine specimen was collected and submitted for culture.  Plan to maintain the Hameed catheter right now as management of the bladder.  Plan to adjust treatment based on urine culture and coordinate follow-up based on whether there is an infection.           Sebas Villalpando MD  Urology  03/04/2025          UROLOGY HISTORY AND PHYSICAL EXAM    Subjective:      Patient ID: Дмитрий Collazo is a 79 y.o. female.    Chief Complaint::   Follow-up  Associated symptoms include arthralgias. Pertinent negatives include no abdominal pain, chest pain or fever.       The patient is an 79-year-old female with a history of emphysematous cystitis that presents today to the urology clinic as a hospital follow-up and reports that the catheter was accidentally pulled out as she got it tangled in her leg and the catheter was pulled out yesterday.  She presents with family and reports that she has noticed some incontinence since yesterday evening but denies any gross hematuria.  The patient is reporting urinary urgency and frequency    Past Medical History:   Diagnosis Date    CVA (cerebrovascular accident)     HTN (hypertension)     Mixed hyperlipidemia     Thyroiditis, unspecified     Type 2 diabetes mellitus with unspecified diabetic retinopathy without macular edema      Past Surgical History:   Procedure Laterality Date    A-V CARDIAC PACEMAKER INSERTION Left 03/05/2024    Procedure: INSERTION, CARDIAC PACEMAKER, DUAL CHAMBER;  Surgeon: Ricci Luciano MD;  Location: [facility] CATH LAB;  Service: Cardiology;  Laterality: Left;     BRAIN SURGERY      HYSTERECTOMY      KNEE SURGERY      TUBAL LIGATION          Medications Ordered Prior to Encounter[1]     Review of patient's allergies indicates:   Allergen Reactions    Celecoxib     Codeine     Penicillins      Vitals:    03/04/25 0814   BP: 139/75   Pulse: 69          Review of Systems   Constitutional:  Positive for activity change. Negative for fever.   Respiratory:  Negative for shortness of breath.    Cardiovascular:  Negative for chest pain and palpitations.   Gastrointestinal:  Negative for abdominal pain.   Genitourinary:  Positive for difficulty urinating, frequency and urgency. Negative for hematuria.   Musculoskeletal:  Positive for arthralgias, back pain and gait problem.   Neurological:  Negative for light-headedness.        Objective:     Physical Exam  Vitals reviewed.   Constitutional:       Appearance: She is not ill-appearing or diaphoretic.   HENT:      Head: Normocephalic.   Eyes:      Conjunctiva/sclera: Conjunctivae normal.   Cardiovascular:      Rate and Rhythm: Normal rate.   Pulmonary:      Effort: Pulmonary effort is normal. No respiratory distress.   Abdominal:      Palpations: Abdomen is soft.      Tenderness: There is no abdominal tenderness.      Comments: The bladder was palpable.    Genitourinary:     Comments: A 16 [place] Hameed catheter was placed using sterile technique there was 800 mL of urine.  The catheter was secured and maintained in position.   Musculoskeletal:         General: No deformity.   Neurological:      Mental Status: Mental status is at baseline.      Sensory: Sensory deficit present.          CMP  Sodium   Date Value Ref Range Status   02/08/2025 139 136 - 145 mmol/L Final     Potassium   Date Value Ref Range Status   02/08/2025 3.5 3.5 - 5.1 mmol/L Final     Chloride   Date Value Ref Range Status   02/08/2025 107 98 - 107 mmol/L Final     CO2   Date Value Ref Range Status   02/08/2025 22 (L) 23 - 31 mmol/L Final     Glucose   Date  Value Ref Range Status   02/08/2025 148 (H) 82 - 115 mg/dL Final     BUN   Date Value Ref Range Status   02/08/2025 16 10 - 20 mg/dL Final     Creatinine   Date Value Ref Range Status   02/08/2025 1.19 (H) 0.55 - 1.02 mg/dL Final     Calcium   Date Value Ref Range Status   02/08/2025 9.4 8.4 - 10.2 mg/dL Final     Total Protein   Date Value Ref Range Status   02/05/2025 6.7 5.8 - 7.6 g/dL Final     Albumin   Date Value Ref Range Status   02/05/2025 3.3 (L) 3.4 - 4.8 g/dL Final     Bilirubin, Total   Date Value Ref Range Status   02/05/2025 0.7 <=1.5 mg/dL Final     Alk Phos   Date Value Ref Range Status   02/05/2025 71 40 - 150 U/L Final     AST   Date Value Ref Range Status   02/05/2025 25 5 - 34 U/L Final     ALT   Date Value Ref Range Status   02/05/2025 9 <=55 U/L Final     Anion Gap   Date Value Ref Range Status   02/08/2025 14 7 - 16 mmol/L Final     eGFR   Date Value Ref Range Status   02/08/2025 46 (L) >=60 mL/min/1.73m2 Final     Comment:     Estimated GFR calculated using the CKD-EPI creatinine (2021) equation.      BMP  Lab Results   Component Value Date     02/08/2025    K 3.5 02/08/2025     02/08/2025    CO2 22 (L) 02/08/2025    BUN 16 02/08/2025    CREATININE 1.19 (H) 02/08/2025    CALCIUM 9.4 02/08/2025    ANIONGAP 14 02/08/2025    EGFRNORACEVR 46 (L) 02/08/2025                [1]  Current Outpatient Medications on File Prior to Visit   Medication Sig Dispense Refill    amLODIPine (NORVASC) 10 MG tablet Take 10 mg by mouth once daily.      irbesartan (AVAPRO) 150 MG tablet Take 150 mg by mouth once daily.      levETIRAcetam (KEPPRA) 500 MG Tab Take 1 tablet by mouth 2 (two) times daily.      levothyroxine (SYNTHROID) 100 MCG tablet Take 100 mcg by mouth before breakfast.      metoprolol succinate (TOPROL-XL) 25 MG 24 hr tablet Take 1 tablet (25 mg total) by mouth once daily. 30 tablet 5    omeprazole (PRILOSEC) 20 MG capsule Take 20 mg by mouth once daily.      oxybutynin (DITROPAN)  5 MG Tab Take 5 mg by mouth 2 (two) times daily.      potassium chloride (K-TAB) 20 mEq Take 20 mEq by mouth 2 (two) times daily.      sertraline (ZOLOFT) 100 MG tablet Take 100 mg by mouth once daily.      simvastatin (ZOCOR) 40 MG tablet Take 40 mg by mouth every evening.      spironolactone (ALDACTONE) 25 MG tablet Take 1 tablet (25 mg total) by mouth once daily. 30 tablet 11     No current facility-administered medications on file prior to visit.

## 2025-03-06 LAB — UA COMPLETE W REFLEX CULTURE PNL UR: NO GROWTH

## 2025-03-26 ENCOUNTER — OFFICE VISIT (OUTPATIENT)
Dept: UROLOGY | Facility: CLINIC | Age: 80
End: 2025-03-26
Payer: MEDICARE

## 2025-03-26 VITALS
DIASTOLIC BLOOD PRESSURE: 62 MMHG | SYSTOLIC BLOOD PRESSURE: 93 MMHG | BODY MASS INDEX: 26.83 KG/M2 | HEIGHT: 61 IN | HEART RATE: 68 BPM

## 2025-03-26 DIAGNOSIS — N30.80 EMPHYSEMATOUS CYSTITIS: Primary | ICD-10-CM

## 2025-03-26 DIAGNOSIS — R33.9 URINARY RETENTION: ICD-10-CM

## 2025-03-26 DIAGNOSIS — E11.65 HYPERGLYCEMIA DUE TO DIABETES MELLITUS: ICD-10-CM

## 2025-03-26 DIAGNOSIS — N26.1 RENAL ATROPHY, RIGHT: ICD-10-CM

## 2025-03-26 DIAGNOSIS — I69.359 HISTORY OF HEMORRHAGIC STROKE WITH RESIDUAL HEMIPARESIS: ICD-10-CM

## 2025-03-26 DIAGNOSIS — R56.9 SEIZURES: ICD-10-CM

## 2025-03-26 DIAGNOSIS — N13.30 HYDRONEPHROSIS, RIGHT: ICD-10-CM

## 2025-03-26 PROCEDURE — 99214 OFFICE O/P EST MOD 30 MIN: CPT | Mod: PBBFAC | Performed by: UROLOGY

## 2025-03-26 PROCEDURE — 99214 OFFICE O/P EST MOD 30 MIN: CPT | Mod: S$PBB,,, | Performed by: UROLOGY

## 2025-03-26 PROCEDURE — 99999 PR PBB SHADOW E&M-EST. PATIENT-LVL IV: CPT | Mod: PBBFAC,,, | Performed by: UROLOGY

## 2025-03-26 NOTE — PROGRESS NOTES
Assessment:   1. Emphysematous cystitis  -     CBC Auto Differential; Future; Expected date: 03/26/2025  -     CT Abdomen Pelvis W Wo Contrast; Future; Expected date: 03/26/2025    2. Urinary retention    3. Renal atrophy, right  -     Basic Metabolic Panel; Future; Expected date: 03/26/2025  -     CT Abdomen Pelvis W Wo Contrast; Future; Expected date: 03/26/2025    4. Hydronephrosis, right  -     Basic Metabolic Panel; Future; Expected date: 03/26/2025  -     CT Abdomen Pelvis W Wo Contrast; Future; Expected date: 03/26/2025    5. History of hemorrhagic stroke with residual hemiparesis    6. Seizures    7. Hyperglycemia due to diabetes mellitus         Plan:     We discussed the previous hydronephrosis on the right and the indications to retain the Hameed catheter right now as treatment of the emphysematous cystitis, I have ordered blood work today with a basic metabolic panel to check a renal function and have scheduled her for a CT urogram to evaluate the right kidney the left kidney and the bladder and determine whether we can remove the catheter and also whether she requires intervention on the right kidney. She is scheduled to see Neurosurgery next week in regards to the history of the hemorrhagic stroke and plan is to follow up with Urology next week after neurosurgical evaluation to review her imaging and determine next step in her management          Sebas Villalpando MD  Urology  03/26/2025          UROLOGY HISTORY AND PHYSICAL EXAM    Subjective:      Patient ID: Дмитрий Collazo is a 79 y.o. female.    Chief Complaint::   Follow-up  Associated symptoms include arthralgias and weakness. Pertinent negatives include no coughing, fever or vomiting.       The patient is an 79-year-old female with a history of emphysematous cystitis that presented with urosepsis and was treated with a Hameed catheter and presents today for follow-up.  Her hematuria catheter that was placed for irrigation has since been removed and  a new smaller catheter is in place draining yellow urine.  She is accompanied by her family today and they report that she is recovering slowly.  They are scheduled to see Neurosurgery next week.  They are not having any gross hematuria the urine is yellow.     Past Medical History:   Diagnosis Date    CVA (cerebrovascular accident)     HTN (hypertension)     Mixed hyperlipidemia     Thyroiditis, unspecified     Type 2 diabetes mellitus with unspecified diabetic retinopathy without macular edema      Past Surgical History:   Procedure Laterality Date    A-V CARDIAC PACEMAKER INSERTION Left 03/05/2024    Procedure: INSERTION, CARDIAC PACEMAKER, DUAL CHAMBER;  Surgeon: Ricci Luciano MD;  Location: [facility] CATH LAB;  Service: Cardiology;  Laterality: Left;    BRAIN SURGERY      HYSTERECTOMY      KNEE SURGERY      TUBAL LIGATION          Medications Ordered Prior to Encounter[1]     Review of patient's allergies indicates:   Allergen Reactions    Celecoxib     Codeine     Penicillins      Vitals:    03/26/25 1119   BP: 93/62   Pulse: 68          Review of Systems   Constitutional:  Positive for activity change. Negative for fever.   Respiratory:  Negative for cough.    Gastrointestinal:  Negative for vomiting.   Genitourinary:  Negative for hematuria.   Musculoskeletal:  Positive for arthralgias, back pain and gait problem.   Neurological:  Positive for weakness. Negative for seizures and syncope.   Psychiatric/Behavioral:  Positive for decreased concentration.       Objective:     Physical Exam  Vitals and nursing note reviewed.   Constitutional:       General: She is not in acute distress.     Appearance: She is ill-appearing. She is not toxic-appearing or diaphoretic.   Cardiovascular:      Rate and Rhythm: Normal rate.   Pulmonary:      Effort: Pulmonary effort is normal. No respiratory distress.   Abdominal:      General: There is no distension.      Tenderness: There is no abdominal tenderness.    Genitourinary:     Comments: Hameed catheter in place draining yellow urine  Musculoskeletal:         General: Swelling present.   Skin:     Findings: No erythema.   Neurological:      Mental Status: She is alert. Mental status is at baseline.      Motor: Weakness present.            CMP  Sodium   Date Value Ref Range Status   02/08/2025 139 136 - 145 mmol/L Final     Potassium   Date Value Ref Range Status   02/08/2025 3.5 3.5 - 5.1 mmol/L Final     Chloride   Date Value Ref Range Status   02/08/2025 107 98 - 107 mmol/L Final     CO2   Date Value Ref Range Status   02/08/2025 22 (L) 23 - 31 mmol/L Final     Glucose   Date Value Ref Range Status   02/08/2025 148 (H) 82 - 115 mg/dL Final     BUN   Date Value Ref Range Status   02/08/2025 16 10 - 20 mg/dL Final     Creatinine   Date Value Ref Range Status   02/08/2025 1.19 (H) 0.55 - 1.02 mg/dL Final     Calcium   Date Value Ref Range Status   02/08/2025 9.4 8.4 - 10.2 mg/dL Final     Total Protein   Date Value Ref Range Status   02/05/2025 6.7 5.8 - 7.6 g/dL Final     Albumin   Date Value Ref Range Status   02/05/2025 3.3 (L) 3.4 - 4.8 g/dL Final     Bilirubin, Total   Date Value Ref Range Status   02/05/2025 0.7 <=1.5 mg/dL Final     Alk Phos   Date Value Ref Range Status   02/05/2025 71 40 - 150 U/L Final     AST   Date Value Ref Range Status   02/05/2025 25 5 - 34 U/L Final     ALT   Date Value Ref Range Status   02/05/2025 9 <=55 U/L Final     Anion Gap   Date Value Ref Range Status   02/08/2025 14 7 - 16 mmol/L Final     eGFR   Date Value Ref Range Status   02/08/2025 46 (L) >=60 mL/min/1.73m2 Final     Comment:     Estimated GFR calculated using the CKD-EPI creatinine (2021) equation.      BMP  Lab Results   Component Value Date     02/08/2025    K 3.5 02/08/2025     02/08/2025    CO2 22 (L) 02/08/2025    BUN 16 02/08/2025    CREATININE 1.19 (H) 02/08/2025    CALCIUM 9.4 02/08/2025    ANIONGAP 14 02/08/2025    EGFRNORACEVR 46 (L) 02/08/2025               [1]   Current Outpatient Medications on File Prior to Visit   Medication Sig Dispense Refill    amLODIPine (NORVASC) 10 MG tablet Take 10 mg by mouth once daily.      irbesartan (AVAPRO) 150 MG tablet Take 150 mg by mouth once daily.      levETIRAcetam (KEPPRA) 500 MG Tab Take 1 tablet by mouth 2 (two) times daily.      levothyroxine (SYNTHROID) 100 MCG tablet Take 100 mcg by mouth before breakfast.      metoprolol succinate (TOPROL-XL) 25 MG 24 hr tablet Take 1 tablet (25 mg total) by mouth once daily. 30 tablet 5    omeprazole (PRILOSEC) 20 MG capsule Take 20 mg by mouth once daily.      oxybutynin (DITROPAN) 5 MG Tab Take 5 mg by mouth 2 (two) times daily.      potassium chloride (K-TAB) 20 mEq Take 20 mEq by mouth 2 (two) times daily.      sertraline (ZOLOFT) 100 MG tablet Take 100 mg by mouth once daily.      simvastatin (ZOCOR) 40 MG tablet Take 40 mg by mouth every evening.      spironolactone (ALDACTONE) 25 MG tablet Take 1 tablet (25 mg total) by mouth once daily. 30 tablet 11     No current facility-administered medications on file prior to visit.

## 2025-04-01 ENCOUNTER — HOSPITAL ENCOUNTER (OUTPATIENT)
Dept: RADIOLOGY | Facility: HOSPITAL | Age: 80
Discharge: HOME | End: 2025-04-01
Attending: UROLOGY
Payer: MEDICARE

## 2025-04-01 DIAGNOSIS — N13.30 HYDRONEPHROSIS, RIGHT: ICD-10-CM

## 2025-04-01 DIAGNOSIS — N26.1 RENAL ATROPHY, RIGHT: ICD-10-CM

## 2025-04-01 DIAGNOSIS — N30.80 EMPHYSEMATOUS CYSTITIS: ICD-10-CM

## 2025-04-01 PROCEDURE — 25500020 PHARM REV CODE 255: Performed by: UROLOGY

## 2025-04-01 PROCEDURE — 74178 CT ABD&PLV WO CNTR FLWD CNTR: CPT | Mod: TC

## 2025-04-01 PROCEDURE — 74178 CT ABD&PLV WO CNTR FLWD CNTR: CPT | Mod: 26,,, | Performed by: RADIOLOGY

## 2025-04-01 RX ORDER — IOPAMIDOL 755 MG/ML
100 INJECTION, SOLUTION INTRAVASCULAR
Status: COMPLETED | OUTPATIENT
Start: 2025-04-01 | End: 2025-04-01

## 2025-04-01 RX ADMIN — IOPAMIDOL 125 ML: 755 INJECTION, SOLUTION INTRAVENOUS at 11:04

## 2025-04-02 ENCOUNTER — OFFICE VISIT (OUTPATIENT)
Dept: UROLOGY | Facility: CLINIC | Age: 80
End: 2025-04-02
Payer: MEDICARE

## 2025-04-02 VITALS
HEIGHT: 61 IN | DIASTOLIC BLOOD PRESSURE: 59 MMHG | HEART RATE: 65 BPM | WEIGHT: 142 LBS | OXYGEN SATURATION: 99 % | SYSTOLIC BLOOD PRESSURE: 94 MMHG | BODY MASS INDEX: 26.81 KG/M2

## 2025-04-02 DIAGNOSIS — R56.9 SEIZURES: ICD-10-CM

## 2025-04-02 DIAGNOSIS — I69.359 HISTORY OF HEMORRHAGIC STROKE WITH RESIDUAL HEMIPARESIS: Primary | ICD-10-CM

## 2025-04-02 DIAGNOSIS — N30.80 EMPHYSEMATOUS CYSTITIS: ICD-10-CM

## 2025-04-02 DIAGNOSIS — R33.9 URINARY RETENTION: ICD-10-CM

## 2025-04-02 DIAGNOSIS — N13.30 HYDRONEPHROSIS, RIGHT: ICD-10-CM

## 2025-04-02 DIAGNOSIS — N26.1 RENAL ATROPHY, RIGHT: ICD-10-CM

## 2025-04-02 DIAGNOSIS — E11.65 HYPERGLYCEMIA DUE TO DIABETES MELLITUS: ICD-10-CM

## 2025-04-02 PROCEDURE — 99999 PR PBB SHADOW E&M-EST. PATIENT-LVL IV: CPT | Mod: PBBFAC,,, | Performed by: UROLOGY

## 2025-04-02 PROCEDURE — 99213 OFFICE O/P EST LOW 20 MIN: CPT | Mod: S$PBB,,, | Performed by: UROLOGY

## 2025-04-02 PROCEDURE — 99214 OFFICE O/P EST MOD 30 MIN: CPT | Mod: PBBFAC | Performed by: UROLOGY

## 2025-04-02 NOTE — PROGRESS NOTES
Assessment:   1. History of hemorrhagic stroke with residual hemiparesis    2. Seizures    3. Emphysematous cystitis    4. Renal atrophy, right    5. Hydronephrosis, right    6. Hyperglycemia due to diabetes mellitus    7. Urinary retention         Plan:     The patient's hemorrhagic stroke and subdural hematoma are being observed and we discussed resolution of the emphysematous cystitis, the right hydronephrosis possible right obstruction and the renal atrophy and resolution of the gas in the wall of the bladder and plans to remove the catheter for trial of voiding.  I have asked that she returns tomorrow for a urinalysis and PVR to ensure that she is emptying given risk of urinary retention.            Sebas Villalpando MD  Urology  04/02/2025          UROLOGY HISTORY AND PHYSICAL EXAM    Subjective:      Patient ID: Дмитрий Collazo is a 79 y.o. female.    Chief Complaint::   Follow-up  Associated symptoms include weakness. Pertinent negatives include no abdominal pain or vomiting.     The patient is an 79-year-old female that presents today for follow-up after undergoing CT scan of the abdomen and pelvis as well as evaluation from neurosurgery.  She is in a wheelchair and the catheter is draining yellow urine and the CT demonstrates resolution of the emphysematous cystitis.  The catheter was removed.     Past Medical History:   Diagnosis Date    CVA (cerebrovascular accident)     HTN (hypertension)     Mixed hyperlipidemia     Thyroiditis, unspecified     Type 2 diabetes mellitus with unspecified diabetic retinopathy without macular edema      Past Surgical History:   Procedure Laterality Date    A-V CARDIAC PACEMAKER INSERTION Left 03/05/2024    Procedure: INSERTION, CARDIAC PACEMAKER, DUAL CHAMBER;  Surgeon: Ricci Luciano MD;  Location: [facility] CATH LAB;  Service: Cardiology;  Laterality: Left;    BRAIN SURGERY      HYSTERECTOMY      KNEE SURGERY      TUBAL LIGATION          Medications Ordered Prior to  Encounter[1]     Review of patient's allergies indicates:   Allergen Reactions    Celecoxib     Codeine     Penicillins      Vitals:    04/02/25 1403   BP: (!) 94/59   Pulse: 65          Review of Systems   Constitutional:  Positive for activity change.   Respiratory:  Negative for shortness of breath.    Gastrointestinal:  Negative for abdominal pain and vomiting.   Genitourinary:  Negative for flank pain and hematuria.   Neurological:  Positive for weakness.      Objective:     Physical Exam  Vitals reviewed.   Constitutional:       General: She is not in acute distress.     Appearance: She is not toxic-appearing or diaphoretic.   Eyes:      General: No scleral icterus.  Cardiovascular:      Rate and Rhythm: Normal rate.   Pulmonary:      Effort: No respiratory distress.   Abdominal:      General: There is no distension.      Tenderness: There is no abdominal tenderness.   Genitourinary:     Comments: The Hameed catheter was removed  Musculoskeletal:         General: No signs of injury.   Skin:     Findings: Bruising present.   Neurological:      Mental Status: She is alert. Mental status is at baseline.      Motor: Weakness present.           [1]   Current Outpatient Medications on File Prior to Visit   Medication Sig Dispense Refill    amLODIPine (NORVASC) 10 MG tablet Take 10 mg by mouth once daily.      irbesartan (AVAPRO) 150 MG tablet Take 150 mg by mouth once daily.      levETIRAcetam (KEPPRA) 500 MG Tab Take 1 tablet by mouth 2 (two) times daily.      levothyroxine (SYNTHROID) 100 MCG tablet Take 100 mcg by mouth before breakfast.      metoprolol succinate (TOPROL-XL) 25 MG 24 hr tablet Take 1 tablet (25 mg total) by mouth once daily. 30 tablet 5    omeprazole (PRILOSEC) 20 MG capsule Take 20 mg by mouth once daily.      sertraline (ZOLOFT) 100 MG tablet Take 100 mg by mouth once daily.      simvastatin (ZOCOR) 40 MG tablet Take 40 mg by mouth every evening.      spironolactone (ALDACTONE) 25 MG tablet Take  1 tablet (25 mg total) by mouth once daily. 30 tablet 11    [DISCONTINUED] oxybutynin (DITROPAN) 5 MG Tab Take 5 mg by mouth 2 (two) times daily.      potassium chloride (K-TAB) 20 mEq Take 20 mEq by mouth 2 (two) times daily.       No current facility-administered medications on file prior to visit.

## 2025-04-03 ENCOUNTER — TELEPHONE (OUTPATIENT)
Dept: UROLOGY | Facility: CLINIC | Age: 80
End: 2025-04-03
Payer: MEDICARE

## 2025-04-03 NOTE — TELEPHONE ENCOUNTER
"Zay Дмитрий SHERMAN Gardner" (Patient)    Subject   Дмитрий Collazo "Brea" (Patient)    Topic   General Inquiry - Patient Advice      Communication   Who Called: Дмитрий Collazo            Patient's daughter in law Shelbi called and states that the patient is very weak and cannot make it to today's appointment at 11. States that the patient urinating fine at this time. So far today patient has urinated twice today. States that she can come in 1 day next week if provider still needs to see patient in office.            Preferred Method of Contact: Phone Call      Patient's Preferred Phone Number on File:      Best Call Back Number, if different:[phone] Shelbi      Additional Information:  Patient states that a detailed message can be left on VM. Phone number has been verified with patient.     "

## 2025-04-17 NOTE — TELEPHONE ENCOUNTER
Spoke with patient's daughter to reschedule appointment.  She states she was taken to the ED yesterday due to talking out of her head and was diagnosed with UTI, sent home this am on home IV antibiotics.  Patient scheduled for 6/9/25 at 2:00, daughter verbalized understanding.

## 2025-08-25 ENCOUNTER — TELEPHONE (OUTPATIENT)
Dept: CARDIOLOGY | Facility: HOSPITAL | Age: 80
End: 2025-08-25
Payer: MEDICARE

## (undated) DEVICE — LINER SEMI-RIGID 1500CC

## (undated) DEVICE — STAPLER SKIN WIDE

## (undated) DEVICE — SET EXTENSION CLEARLINK 2INJ

## (undated) DEVICE — CATH IV 20G 1.16 IN AUTOGARD

## (undated) DEVICE — NDL PERCUTANEOUS ENTRYBSDN 18

## (undated) DEVICE — ETCO2 NC MICROSTR FEM ST ADLT

## (undated) DEVICE — ADPT IV NDLLSS M LL CLRLNK

## (undated) DEVICE — TUBE SUCTION MEDI-VAC STERILE

## (undated) DEVICE — KIT IV START WITH PREVANTICS

## (undated) DEVICE — DRAPE INCISE IOBAN 2 13X13IN

## (undated) DEVICE — SCALPEL #15 BLADE STRL DISP.

## (undated) DEVICE — GLOVE SENSICARE PI SURG 8

## (undated) DEVICE — GLOVE SENSICARE PI ALOE 5.5

## (undated) DEVICE — COVER SNAP KAP 26IN

## (undated) DEVICE — DRESSING SILVERLON  ISL 4X6IN

## (undated) DEVICE — DECANTER FLUID TRNSF WHITE 9IN

## (undated) DEVICE — TOWEL OR DISP STRL BLUE 4/PK

## (undated) DEVICE — INTRODUCER CATH 6F 11CM

## (undated) DEVICE — SET IV PRIMARY

## (undated) DEVICE — CUFF FLEXIPORT BP LONG ADULT

## (undated) DEVICE — IMMOB SHOULDER ELASTIC LARGE

## (undated) DEVICE — SPRAY NOZIN ANTISEPTIC 12ML

## (undated) DEVICE — CLOTH READYPREP 2%CHG 9X10.5IN

## (undated) DEVICE — PENCIL ELECTROSURG HOLST W/BLD

## (undated) DEVICE — TIP YANKAUERS BULB NO VENT

## (undated) DEVICE — BOWL STERILE LARGE 32OZ

## (undated) DEVICE — ELECTRODE REM PLYHSV RETURN 9

## (undated) DEVICE — COUNT NDL FOAM MAGNET 40COUNT

## (undated) DEVICE — NDL SPINAL QUICKNE 22GA

## (undated) DEVICE — DRAPE PACEMAKER PROXIMA

## (undated) DEVICE — SUT VCRL + COAT VIOL 2-0 27IN

## (undated) DEVICE — PACK UNIV PROCEDURE

## (undated) DEVICE — SET EXT CATH NONDEHP .8 6.5IN

## (undated) DEVICE — APPLICATOR CHLORAPREP ORN 26ML

## (undated) DEVICE — PAD RADIOLUCENT STAT ADULT

## (undated) DEVICE — SHEATH SAFE ULTRA 7FR

## (undated) DEVICE — GOWN NONREINF SET-IN SLV 2XL

## (undated) DEVICE — DRESSING GZ XRAY 12PLY 4X8 STR

## (undated) DEVICE — OXISENSOR ADULT DIGIT N/S